# Patient Record
Sex: FEMALE | Race: WHITE | NOT HISPANIC OR LATINO | Employment: OTHER | URBAN - METROPOLITAN AREA
[De-identification: names, ages, dates, MRNs, and addresses within clinical notes are randomized per-mention and may not be internally consistent; named-entity substitution may affect disease eponyms.]

---

## 2017-01-11 ENCOUNTER — TRANSCRIBE ORDERS (OUTPATIENT)
Dept: LAB | Facility: HOSPITAL | Age: 82
End: 2017-01-11

## 2017-01-11 ENCOUNTER — APPOINTMENT (OUTPATIENT)
Dept: LAB | Facility: HOSPITAL | Age: 82
End: 2017-01-11
Attending: INTERNAL MEDICINE
Payer: COMMERCIAL

## 2017-01-11 DIAGNOSIS — E55.9 VITAMIN D DEFICIENCY: ICD-10-CM

## 2017-01-11 DIAGNOSIS — E78.5 HYPERLIPIDEMIA, UNSPECIFIED HYPERLIPIDEMIA TYPE: ICD-10-CM

## 2017-01-11 DIAGNOSIS — E87.8 OTHER DISORDERS OF ELECTROLYTE AND FLUID BALANCE, NOT ELSEWHERE CLASSIFIED: ICD-10-CM

## 2017-01-11 DIAGNOSIS — Q78.2 OSTEOPETROSIS: ICD-10-CM

## 2017-01-11 DIAGNOSIS — M19.90 OSTEOARTHRITIS: ICD-10-CM

## 2017-01-11 DIAGNOSIS — I10 ESSENTIAL (PRIMARY) HYPERTENSION: ICD-10-CM

## 2017-01-11 DIAGNOSIS — E78.5 HYPERLIPIDEMIA: ICD-10-CM

## 2017-01-11 DIAGNOSIS — M19.90 SENILE ARTHRITIS: Primary | ICD-10-CM

## 2017-01-11 DIAGNOSIS — R07.89 OTHER CHEST PAIN: ICD-10-CM

## 2017-01-11 DIAGNOSIS — I10 ESSENTIAL HYPERTENSION, MALIGNANT: ICD-10-CM

## 2017-01-11 LAB
ALT SERPL W P-5'-P-CCNC: 18 U/L (ref 12–78)
ANION GAP SERPL CALCULATED.3IONS-SCNC: 7 MMOL/L (ref 4–13)
AST SERPL W P-5'-P-CCNC: 12 U/L (ref 5–45)
BUN SERPL-MCNC: 17 MG/DL (ref 5–25)
CALCIUM SERPL-MCNC: 9.7 MG/DL (ref 8.3–10.1)
CHLORIDE SERPL-SCNC: 105 MMOL/L (ref 100–108)
CHOLEST SERPL-MCNC: 215 MG/DL (ref 50–200)
CO2 SERPL-SCNC: 29 MMOL/L (ref 21–32)
CREAT SERPL-MCNC: 0.95 MG/DL (ref 0.6–1.3)
ERYTHROCYTE [DISTWIDTH] IN BLOOD BY AUTOMATED COUNT: 12.6 % (ref 11.6–15.1)
GFR SERPL CREATININE-BSD FRML MDRD: 56.6 ML/MIN/1.73SQ M
GLUCOSE SERPL-MCNC: 89 MG/DL (ref 65–140)
HCT VFR BLD AUTO: 41.7 % (ref 34.8–46.1)
HDLC SERPL-MCNC: 79 MG/DL (ref 40–60)
HGB BLD-MCNC: 13.3 G/DL (ref 11.5–15.4)
LDLC SERPL CALC-MCNC: 114 MG/DL (ref 0–100)
LDLC SERPL DIRECT ASSAY-MCNC: 124 MG/DL (ref 0–100)
MCH RBC QN AUTO: 28.2 PG (ref 26.8–34.3)
MCHC RBC AUTO-ENTMCNC: 31.9 G/DL (ref 31.4–37.4)
MCV RBC AUTO: 89 FL (ref 82–98)
PLATELET # BLD AUTO: 240 THOUSANDS/UL (ref 149–390)
PMV BLD AUTO: 11.6 FL (ref 8.9–12.7)
POTASSIUM SERPL-SCNC: 4.6 MMOL/L (ref 3.5–5.3)
RBC # BLD AUTO: 4.71 MILLION/UL (ref 3.81–5.12)
SODIUM SERPL-SCNC: 141 MMOL/L (ref 136–145)
TRIGL SERPL-MCNC: 112 MG/DL
WBC # BLD AUTO: 7.2 THOUSAND/UL (ref 4.31–10.16)

## 2017-01-11 PROCEDURE — 80048 BASIC METABOLIC PNL TOTAL CA: CPT

## 2017-01-11 PROCEDURE — 85027 COMPLETE CBC AUTOMATED: CPT

## 2017-01-11 PROCEDURE — 36415 COLL VENOUS BLD VENIPUNCTURE: CPT

## 2017-01-11 PROCEDURE — 83721 ASSAY OF BLOOD LIPOPROTEIN: CPT

## 2017-01-11 PROCEDURE — 84460 ALANINE AMINO (ALT) (SGPT): CPT

## 2017-01-11 PROCEDURE — 80061 LIPID PANEL: CPT

## 2017-01-11 PROCEDURE — 84450 TRANSFERASE (AST) (SGOT): CPT

## 2017-01-13 ENCOUNTER — ALLSCRIPTS OFFICE VISIT (OUTPATIENT)
Dept: OTHER | Facility: OTHER | Age: 82
End: 2017-01-13

## 2017-01-13 ENCOUNTER — TRANSCRIBE ORDERS (OUTPATIENT)
Dept: ADMINISTRATIVE | Facility: HOSPITAL | Age: 82
End: 2017-01-13

## 2017-01-13 DIAGNOSIS — R07.89 CHEST DISCOMFORT: Primary | ICD-10-CM

## 2017-01-19 ENCOUNTER — GENERIC CONVERSION - ENCOUNTER (OUTPATIENT)
Dept: OTHER | Facility: OTHER | Age: 82
End: 2017-01-19

## 2017-01-19 ENCOUNTER — HOSPITAL ENCOUNTER (OUTPATIENT)
Dept: NON INVASIVE DIAGNOSTICS | Facility: CLINIC | Age: 82
Discharge: HOME | End: 2017-01-19
Payer: COMMERCIAL

## 2017-01-19 DIAGNOSIS — R07.89 CHEST DISCOMFORT: ICD-10-CM

## 2017-01-19 DIAGNOSIS — R07.2 PRECORDIAL PAIN: ICD-10-CM

## 2017-01-19 LAB
ARRHY DURING EX: NORMAL
CHEST PAIN STATEMENT: NORMAL
MAX DIASTOLIC BP: 70 MMHG
MAX HEART RATE: 131 BPM
MAX PREDICTED HEART RATE: 140 BPM
MAX. SYSTOLIC BP: 160 MMHG
PROTOCOL NAME: NORMAL
REASON FOR TERMINATION: NORMAL
TARGET HR FORMULA: NORMAL
TEST INDICATION: NORMAL
TIME IN EXERCISE PHASE: 300 S

## 2017-01-19 PROCEDURE — A9502 TC99M TETROFOSMIN: HCPCS

## 2017-01-19 PROCEDURE — 78452 HT MUSCLE IMAGE SPECT MULT: CPT

## 2017-01-19 PROCEDURE — 93017 CV STRESS TEST TRACING ONLY: CPT

## 2017-02-03 ENCOUNTER — ALLSCRIPTS OFFICE VISIT (OUTPATIENT)
Dept: OTHER | Facility: OTHER | Age: 82
End: 2017-02-03

## 2017-05-22 ENCOUNTER — ALLSCRIPTS OFFICE VISIT (OUTPATIENT)
Dept: OTHER | Facility: OTHER | Age: 82
End: 2017-05-22

## 2017-05-22 DIAGNOSIS — I10 ESSENTIAL (PRIMARY) HYPERTENSION: ICD-10-CM

## 2017-05-22 DIAGNOSIS — E78.5 HYPERLIPIDEMIA: ICD-10-CM

## 2017-05-22 DIAGNOSIS — E55.9 VITAMIN D DEFICIENCY: ICD-10-CM

## 2017-06-14 ENCOUNTER — GENERIC CONVERSION - ENCOUNTER (OUTPATIENT)
Dept: OTHER | Facility: OTHER | Age: 82
End: 2017-06-14

## 2017-06-26 ENCOUNTER — GENERIC CONVERSION - ENCOUNTER (OUTPATIENT)
Dept: OTHER | Facility: OTHER | Age: 82
End: 2017-06-26

## 2017-07-25 ENCOUNTER — GENERIC CONVERSION - ENCOUNTER (OUTPATIENT)
Dept: OTHER | Facility: OTHER | Age: 82
End: 2017-07-25

## 2017-09-07 ENCOUNTER — GENERIC CONVERSION - ENCOUNTER (OUTPATIENT)
Dept: OTHER | Facility: OTHER | Age: 82
End: 2017-09-07

## 2017-10-03 ENCOUNTER — LAB REQUISITION (OUTPATIENT)
Dept: LAB | Facility: HOSPITAL | Age: 82
End: 2017-10-03
Payer: COMMERCIAL

## 2017-10-03 DIAGNOSIS — E55.9 VITAMIN D DEFICIENCY: ICD-10-CM

## 2017-10-03 DIAGNOSIS — I10 ESSENTIAL (PRIMARY) HYPERTENSION: ICD-10-CM

## 2017-10-03 DIAGNOSIS — E78.5 HYPERLIPIDEMIA: ICD-10-CM

## 2017-10-03 LAB
25(OH)D3 SERPL-MCNC: 38.4 NG/ML (ref 30–100)
ANION GAP SERPL CALCULATED.3IONS-SCNC: 4 MMOL/L (ref 4–13)
BASOPHILS # BLD AUTO: 0.04 THOUSANDS/ΜL (ref 0–0.1)
BASOPHILS NFR BLD AUTO: 1 % (ref 0–1)
BUN SERPL-MCNC: 27 MG/DL (ref 5–25)
CALCIUM SERPL-MCNC: 9.9 MG/DL (ref 8.3–10.1)
CHLORIDE SERPL-SCNC: 106 MMOL/L (ref 100–108)
CHOLEST SERPL-MCNC: 196 MG/DL (ref 50–200)
CO2 SERPL-SCNC: 29 MMOL/L (ref 21–32)
CREAT SERPL-MCNC: 1.02 MG/DL (ref 0.6–1.3)
EOSINOPHIL # BLD AUTO: 0.13 THOUSAND/ΜL (ref 0–0.61)
EOSINOPHIL NFR BLD AUTO: 2 % (ref 0–6)
ERYTHROCYTE [DISTWIDTH] IN BLOOD BY AUTOMATED COUNT: 12.7 % (ref 11.6–15.1)
GFR SERPL CREATININE-BSD FRML MDRD: 52 ML/MIN/1.73SQ M
GLUCOSE P FAST SERPL-MCNC: 96 MG/DL (ref 65–99)
HCT VFR BLD AUTO: 43 % (ref 34.8–46.1)
HDLC SERPL-MCNC: 82 MG/DL (ref 40–60)
HGB BLD-MCNC: 13.5 G/DL (ref 11.5–15.4)
LDLC SERPL CALC-MCNC: 94 MG/DL (ref 0–100)
LYMPHOCYTES # BLD AUTO: 3.01 THOUSANDS/ΜL (ref 0.6–4.47)
LYMPHOCYTES NFR BLD AUTO: 44 % (ref 14–44)
MCH RBC QN AUTO: 28 PG (ref 26.8–34.3)
MCHC RBC AUTO-ENTMCNC: 31.4 G/DL (ref 31.4–37.4)
MCV RBC AUTO: 89 FL (ref 82–98)
MONOCYTES # BLD AUTO: 0.57 THOUSAND/ΜL (ref 0.17–1.22)
MONOCYTES NFR BLD AUTO: 9 % (ref 4–12)
NEUTROPHILS # BLD AUTO: 2.91 THOUSANDS/ΜL (ref 1.85–7.62)
NEUTS SEG NFR BLD AUTO: 44 % (ref 43–75)
NRBC BLD AUTO-RTO: 0 /100 WBCS
PLATELET # BLD AUTO: 246 THOUSANDS/UL (ref 149–390)
PMV BLD AUTO: 11.6 FL (ref 8.9–12.7)
POTASSIUM SERPL-SCNC: 4.6 MMOL/L (ref 3.5–5.3)
RBC # BLD AUTO: 4.82 MILLION/UL (ref 3.81–5.12)
SODIUM SERPL-SCNC: 139 MMOL/L (ref 136–145)
TRIGL SERPL-MCNC: 99 MG/DL
WBC # BLD AUTO: 6.68 THOUSAND/UL (ref 4.31–10.16)

## 2017-10-03 PROCEDURE — 85025 COMPLETE CBC W/AUTO DIFF WBC: CPT | Performed by: INTERNAL MEDICINE

## 2017-10-03 PROCEDURE — 80061 LIPID PANEL: CPT | Performed by: INTERNAL MEDICINE

## 2017-10-03 PROCEDURE — 80048 BASIC METABOLIC PNL TOTAL CA: CPT | Performed by: INTERNAL MEDICINE

## 2017-10-03 PROCEDURE — 82306 VITAMIN D 25 HYDROXY: CPT | Performed by: INTERNAL MEDICINE

## 2017-10-16 ENCOUNTER — ALLSCRIPTS OFFICE VISIT (OUTPATIENT)
Dept: OTHER | Facility: OTHER | Age: 82
End: 2017-10-16

## 2017-10-17 NOTE — PROGRESS NOTES
Assessment  1  Benign essential hypertension (401 1) (I10)   2  Hyperlipidemia (272 4) (E78 5)   3  Vitamin D deficiency (268 9) (E55 9)    Plan  Benign essential hypertension    · AmLODIPine Besylate 10 MG Oral Tablet; TAKE 1 TABLET DAILY  Benign essential hypertension, Hyperlipidemia, Vitamin D deficiency    · Follow-up visit in 4 Months Evaluation and Treatment  Follow-up  Status: Hold For - Scheduling   Requested for: 16Oct2017  Hyperchloremia    · Simvastatin 20 MG Oral Tablet; Take 1 tablet daily  PMH: Anxiety    · LORazepam 0 5 MG Oral Tablet; TAKE ONE TABLET AT NIGHT AS NEEDED  PMH: Encounter for screening for malignant neoplasm of colon    · COLONOSCOPY; Status:Active; Requested for:16Oct2017;   PMH: History of esophageal reflux    · Pantoprazole Sodium 40 MG Oral Tablet Delayed Release; TAKE 1 TABLET DAILY    Discussion/Summary  Discussion Summary:   See med list discussed  Counseling Documentation With Imm: The patient was counseled regarding diagnostic results,-- instructions for management,-- risk factor reductions,-- prognosis  Chief Complaint  Chief Complaint Free Text Note Form: Pt is here for a f/u appt  Pt states that she was dx w/basal cell carcinoma by [facility], and has some questions and concerns  Pt states that she is still getting pain in her left side of chest and a heaviness in her left arm and is concerned, even though she had a stress test and EKG in January, she is still concerned about it  BW       History of Present Illness  Hyperlipidemia (Follow-Up): The patient states her hyperlipidemia has been stable since the last visit  Comorbid Illnesses: hypertension  Symptoms: The patient is currently asymptomatic  Medications: the patient is adherent with her medication regimen  -- She denies medication side effects  The patient is doing well with her hyperlipidemia goals  Hypertension (Follow-Up): The patient presents for follow-up of essential hypertension   The patient states she has been doing well with her blood pressure control since the last visit  She has no comorbid illnesses  Symptoms: worsened lower extremity edema  Home monitoring: The patient checks her blood pressure sporadically  Medications: the patient is adherent with her medication regimen  -- She denies medication side effects  The patient is due for a lipid panel-- and-- a serum creatinine  Review of Systems  PHQ-9 Depression Scale: Over the past 2 weeks, how often have you been bothered by the following problems? 1 ) Little interest or pleasure in doing things? Not at all    2 ) Feeling down, depressed or hopeless? Not at all    3 ) Trouble falling asleep or sleeping too much? Nearly every day  4 ) Feeling tired or having little energy? Several days  5 ) Poor appetite or overeating? Not at all    6 ) Feeling bad about yourself, or that you are a failure, or have let yourself or your family down? Several days  7 ) Trouble concentrating on things, such as reading a newspaper or watching television? Not at all    8 ) Moving or speaking so slowly that other people could have noticed, or the opposite, moving or speaking faster than usual? Not at all  severity of depression is mild   How difficult have these problems made it for you to do your work, take care of things at home, or get along with people? Not at all  Score 5       Preventive Quality 65 Older:   Preventive Quality 65 and Older: Falls Risk: The patient fell 0 times in the past 12 months  Symptoms Include: impaired balance and visual problems, but no confusion, no lightheadedness, no vertigo, no dizziness, no syncope, no leg weakness, no recurring falls and no recent fall  The patient is currently experiencing fall symptoms  The patient is currently experiencing urinary symptoms   Urinary Incontinence Symptoms includes: incomplete bladder emptying, urinary frequency, urinary urgency, urinary hesitancy, nocturia and intermittent stream, but no urinary incontinence, no dysuria, no post-void dribbling, no vaginal pressure and no vaginal dryness       Active Problems  1  Arthritis of knee (716 96) (M17 10)   2  Benign essential hypertension (401 1) (I10)   3  Chest wall pain (786 52) (R07 89)   4  Hyperchloremia (276 9) (E87 8)   5  Hyperlipidemia (272 4) (E78 5)   6  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)   7  Precordial pain (786 51) (R07 2)   8  Vitamin D deficiency (268 9) (E55 9)    Past Medical History  1  History of Anxiety (300 00) (F41 9)   2  History of Appendicitis (541) (K37)   3  History of Arm pain, right (729 5) (M79 601)   4  History of Asymptomatic varicose veins (454 9) (I83 90)   5  History of Biceps strain (840 8) (S46 219A)   6  History of Biceps tendonitis (726 12) (M75 20)   7  History of Bruise of face (920) (S00 83XA)   8  History of Chest injury (959 11) (S29 9XXA)   9  History of dizziness (V13 89) (Z87 898)   10  History of esophageal reflux (V12 79) (Z87 19)   11  History of esophageal reflux (V12 79) (Z87 19)   12  History of fracture of rib (V15 51) (Z87 81)   13  History of hematuria (V13 09) (Z87 448)   14  History of insomnia (V13 89) (Z87 898)   15  History of motion sickness (V13 89) (Z87 898)   16  History of tendinitis (V13 59) (Z87 39)   17  History of urinary tract infection (V13 02) (Z87 440)   18  History of Knee pain (719 46) (M25 569)   19  History of Paronychia or onychia of finger (681 02) (L03 019)   20  History of Rib pain (786 50) (R07 81)   21  History of Shoulder strain (840 9) (S46 919A)   22  History of Spider veins (448 1) (I78 1)   23  History of Urinary hesitancy (788 64) (R39 11)   24  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,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)  Active Problems And Past Medical History Reviewed: The active problems and past medical history were reviewed and updated today  Surgical History  1  History of Appendectomy   2  History of Hysterectomy   3  History of Incisional Breast Biopsy   4  History of Vaginal Hysterectomy  Surgical History Reviewed: The surgical history was reviewed and updated today  Family History  Mother    1  Family history of diabetes mellitus (V18 0) (Z83 3)   2  Family history of Varicose veins  Brother    3  Family history of diabetes mellitus (V18 0) (Z83 3)  Family History Reviewed: The family history was reviewed and updated today  Social History   · Being A Social Drinker   · Never A Smoker   · No illicit drug use  Social History Reviewed: The social history was reviewed and updated today  Current Meds   1  AmLODIPine Besylate 10 MG Oral Tablet; TAKE 1 TABLET DAILY; Therapy: 31CIW5935 to (Evaluate:04Apr2017)  Requested for: 69MCY5908; Last Rx:06Oct2016   Ordered   2  LORazepam 0 5 MG Oral Tablet; TAKE ONE TABLET AT NIGHT AS NEEDED; Therapy: 68AJS5655 to (Last Rx:25Jan2017)  Requested for: 11Oct2017; Status: ACTIVE - Renewal   Denied Ordered   3  Pantoprazole Sodium 40 MG Oral Tablet Delayed Release; TAKE 1 TABLET DAILY; Therapy: 41QZO6703 to (Evaluate:72Xcu2682)  Requested for: 25Jan2017; Last Rx:25Jan2017   Ordered   4  Simvastatin 20 MG Oral Tablet; Take 1 tablet daily; Therapy: 96RID5443 to (Evaluate:16Oct2017)  Requested for: 19Apr2017; Last Rx:19Apr2017   Ordered   5  Vitamin D3 2000 UNIT Oral Capsule; take 1 capsule daily; Therapy: 24KVJ1498 to Recorded  Medication List Reviewed: The medication list was reviewed and updated today  Allergies  1  Daypro   2  Minocin  3  No Known Environmental Allergies   4   No Known Food Allergies    Vitals  Vital Signs    Recorded: 45LDN6134 08:49AM   Temperature 97 7 F, Oral   Heart Rate 74   Systolic 412, LUE, Sitting   Diastolic 58, LUE, Sitting   Height 5 ft 3 in   Weight 128 lb 8 oz   BMI Calculated 22 76   BSA Calculated 1 6   O2 Saturation 98, RA     Physical Exam    Constitutional   General appearance: No acute distress, well appearing and well nourished  Eyes   Conjunctiva and lids: No swelling, erythema or discharge  Ears, Nose, Mouth, and Throat   External inspection of ears and nose: Normal     Nasal mucosa, septum, and turbinates: Normal without edema or erythema  Oropharynx: Normal with no erythema, edema, exudate or lesions  Pulmonary   Auscultation of lungs: Clear to auscultation  Cardiovascular   Auscultation of heart: Normal rate and rhythm, normal S1 and S2, without murmurs  Examination of extremities for edema and/or varicosities: Normal     Carotid pulses: Normal     Abdomen   Abdomen: Non-tender, no masses  Liver and spleen: No hepatomegaly or splenomegaly  Musculoskeletal   Gait and station: Normal     Skin   Skin and subcutaneous tissue: Abnormal  -- small lesion over lt  cheel  Neurologic   Cranial nerves: Cranial nerves 2-12 intact  Reflexes: 2+ and symmetric  Psychiatric   Orientation to person, place, and time: Normal     Mood and affect: Abnormal   Mood and Affect: anxious  Results/Data  (1) CBC/PLT/DIFF 03Oct2017 01:03PM Du Srinivasan     Test Name Result Flag Reference   WBC COUNT 6 68 Thousand/uL  4 31-10 16   RBC COUNT 4 82 Million/uL  3 81-5 12   HEMOGLOBIN 13 5 g/dL  11 5-15 4   HEMATOCRIT 43 0 %  34 8-46  1   MCV 89 fL  82-98   MCH 28 0 pg  26 8-34 3   MCHC 31 4 g/dL  31 4-37 4   RDW 12 7 %  11 6-15 1   MPV 11 6 fL  8 9-12 7   PLATELET COUNT 962 Thousands/uL  149-390   nRBC AUTOMATED 0 /100 WBCs     NEUTROPHILS RELATIVE PERCENT 44 %  43-75   LYMPHOCYTES RELATIVE PERCENT 44 %  14-44   MONOCYTES RELATIVE PERCENT 9 %  4-12   EOSINOPHILS RELATIVE PERCENT 2 %  0-6   BASOPHILS RELATIVE PERCENT 1 %  0-1   NEUTROPHILS ABSOLUTE COUNT 2 91 Thousands/? ??L  1 85-7 62   LYMPHOCYTES ABSOLUTE COUNT 3 01 Thousands/? ??L  0 60-4 47   MONOCYTES ABSOLUTE COUNT 0 57 Thousand/? ??L  0 17-1 22   EOSINOPHILS ABSOLUTE COUNT 0 13 Thousand/? ??L  0 00-0 61   BASOPHILS ABSOLUTE COUNT 0 04 Thousands/? ??L  0 00-0 10 This is a patient instruction: This test is non-fasting  Please drink two glasses of water morning of bloodwork  (1) VITAMIN D 25-HYDROXY 14Fox2659 01:03PM [facility]     Test Name Result Flag Reference   VIT D 25-HYDROX 38 4 ng/mL  30 0-100 0   This assay is a certified procedure of the CDC Vitamin D Standardization Certification Program (VDSCP)     Deficiency <20ng/ml   Insufficiency 20-30ng/ml   Sufficient  ng/ml     *Patients undergoing fluorescein dye angiography may retain small amounts of fluorescein in the body for 48-72 hours post procedure  Samples containing fluorescein can produce falsely elevated Vitamin D values  If the patient had this procedure, a specimen should be resubmitted post fluorescein clearance  (1) BASIC METABOLIC PROFILE [id number] 01:03PM [facility]     Test Name Result Flag Reference   SODIUM 139 mmol/L  136-145   POTASSIUM 4 6 mmol/L  3 5-5 3   CHLORIDE 106 mmol/L  100-108   CARBON DIOXIDE 29 mmol/L  21-32   ANION GAP (CALC) 4 mmol/L  4-13   BLOOD UREA NITROGEN 27 mg/dL H 5-25   CREATININE 1 02 mg/dL  0 60-1 30   Standardized to IDMS reference method   CALCIUM 9 9 mg/dL  8 3-10 1   eGFR 52 ml/min/1 73sq m     National Kidney Disease Education Program recommendations are as follows:  GFR calculation is accurate only with a steady state creatinine  Chronic Kidney disease less than 60 ml/min/1 73 sq  meters  Kidney failure less than 15 ml/min/1 73 sq  meters  GLUCOSE FASTING 96 mg/dL  65-99   Specimen collection should occur prior to Sulfasalazine administration due to the potential for falsely depressed results  Specimen collection should occur prior to Sulfapyridine administration due to the potential for falsely elevated results       (1) LIPID PANEL, FASTING [id number] 01:03PM [facility]     Test Name Result Flag Reference   CHOLESTEROL 196 mg/dL     HDL,DIRECT 82 mg/dL H 40-60   Specimen collection should occur prior to Metamizole administration due to the potential for falsley depressed results  LDL CHOLESTEROL CALCULATED 94 mg/dL  0-100   This is a patient instruction: This is a fasting test  Water, black tea or black coffee only after 9:00pm the night before the test  Drink 2 glasses of water the morning of the test       Triglyceride:        Normal <150 mg/dl   Borderline High 150-199 mg/dl   High 200-499 mg/dl   Very High >499 mg/dl      Cholesterol:       Desirable <200 mg/dl    Borderline High 200-239 mg/dl    High >239 mg/dl      HDL Cholesterol:       High>59 mg/dL    Low <41 mg/dL      This screening LDL is a calculated result  It does not have the accuracy of the Direct Measured LDL in the monitoring of patients with hyperlipidemia and/or statin therapy  Direct Measure LDL (FGT751) must be ordered separately in these patients  TRIGLYCERIDES 99 mg/dL  <=150   Specimen collection should occur prior to N-Acetylcysteine or Metamizole administration due to the potential for falsely depressed results  Health Management  History of Encounter for screening mammogram for malignant neoplasm of breast   * MAMMO SCREENING BILATERAL W CAD; every 1 year; Last 78XPF8445; Next Due: 47Jyw6284; Active  History of Screening for genitourinary condition   *VB - Urinary Incontinence Screen (Dx Z13 89 Screen for UI); every 1 year; Last 07KNN9090; Next  Due: 20ARB2818; Overdue  History of Screening for neurological condition   *VB - Fall Risk Assessment  (Dx Z13 89 Screen for Neurologic Disorder); every 1 year; Last  08PPU1067; Next Due: 54AWO3452; Overdue  Health Maintenance   Medicare Annual Wellness Visit; every 1 year; Last 88ICK7109; Next Due: 84XGW2375; Overdue    Signatures   Electronically signed by :  Eladia Butt MD; Oct 16 2017  9:19AM EST                       (Author)

## 2017-11-03 ENCOUNTER — GENERIC CONVERSION - ENCOUNTER (OUTPATIENT)
Dept: OTHER | Facility: OTHER | Age: 82
End: 2017-11-03

## 2017-11-28 ENCOUNTER — GENERIC CONVERSION - ENCOUNTER (OUTPATIENT)
Dept: OTHER | Facility: OTHER | Age: 82
End: 2017-11-28

## 2017-11-28 DIAGNOSIS — M54.50 LOW BACK PAIN: ICD-10-CM

## 2018-01-12 VITALS
HEART RATE: 78 BPM | WEIGHT: 130.13 LBS | SYSTOLIC BLOOD PRESSURE: 118 MMHG | BODY MASS INDEX: 23.06 KG/M2 | TEMPERATURE: 98.1 F | HEIGHT: 63 IN | OXYGEN SATURATION: 98 % | DIASTOLIC BLOOD PRESSURE: 68 MMHG

## 2018-01-12 NOTE — PROGRESS NOTES
Assessment    1  Encounter for preventive health examination (V70 0) (Z00 00)    Plan  Benign essential hypertension, Health Maintenance, Hyperchloremia    · (1) CBC/ PLT (NO DIFF); Status:Active; Requested [id number]; Health Maintenance    · (1) ALT (SGPT); Status:Active; Requested CPN:[id number];    · (1) AST (SGOT); Status:Active; Requested WNQ:[id number];    · (1) BASIC METABOLIC PROFILE; Status:Active; Requested [id number];    · (Q) LIPID PANEL WITH DIRECT LDL; Status:Active; Requested ECD:[id number];    · Follow-up visit in 3 months Evaluation and Treatment  Follow-up  Status: Hold For -  Scheduling  Requested for: 72UBE8679  PMH: History of esophageal reflux    · Pantoprazole Sodium 40 MG Oral Tablet Delayed Release; TAKE 1 TABLET  DAILY  Vitamin D deficiency    · Vitamin D3 12653 UNIT Oral Capsule; Take 1 capsule, 1 x per week, for 12 weeks    Discussion/Summary  Impression: Initial Annual Wellness Visit  Cardiovascular screening and counseling: the risks and benefits of screening were discussed and screening is current  Diabetes screening and counseling: the risks and benefits of screening were discussed and screening is current  Colorectal cancer screening and counseling: the risks and benefits of screening were discussed and screening is current  Breast cancer screening and counseling: the risks and benefits of screening were discussed and screening is current  Cervical cancer screening and counseling: the risks and benefits of screening were discussed and screening not indicated  Osteoporosis screening and counseling: the risks and benefits of screening were discussed and the patient declines screening  Abdominal aortic aneurysm screening and counseling: screening not indicated  HIV screening and counseling: the risks and benefits of screening were discussed and screening not indicated   Immunizations: the risks and benefits of influenza vaccination were discussed with the patient, influenza vaccine is up to date this year, the risks and benefits of pneumococcal vaccination were discussed with the patient, the lifetime pneumococcal vaccine has been completed, hepatitis B prevention counseling was provided, the risks and benefits of the hepatitis vaccination series were discussed with the patient, the patient declines the hepatitis vaccination series, the risks and benefits of the Zostavax vaccine were discussed with the patient, Zostavax vaccination up to date, the risks and benefits of the Td vaccine were discussed with the patient, the patient declines the Td vaccine, the risks and benefits of the Tdap vaccine were discussed with the patient and the patient declines the Tdap vaccine  Advance Directive Planning: complete and up to date  Advice and education were given regarding fall risk reduction  She was referred to none  Medical Equipment/Suppliers: none  Patient Discussion: plan discussed with the patient, follow-up visit needed in one year  Chief Complaint  Medicare wellness visit      History of Present Illness  Welcome to Medicare and Wellness Visits: The patient is being seen for the subsequent annual wellness visit  Medicare Screening and Risk Factors   Hospitalizations: she has been previously hospitalizied and 6  Medicare Screening Tests Risk Questions   Osteoporosis risk assessment: , female gender and over 48years of age  Drug and Alcohol Use: The patient has never smoked cigarettes  The patient reports occasional alcohol use  Alcohol concern:   The patient has no concerns about alcohol abuse  She has never used illicit drugs  Diet and Physical Activity: Current diet includes 1 servings of fruit per day, 1 servings of vegetables per day, 1 servings of meat per day, 1 servings of whole grains per day and 2 cups of coffee per day  She exercises 1 times per week  Exercise: 2 hours per week     Mood Disorder and Cognitive Impairment Screening: She denies feeling down, depressed, or hopeless over the past two weeks  Cognitive impairment screening: denies difficulty learning/retaining new information, denies difficulty handling complex tasks, denies difficulty with reasoning, denies difficulty with spatial ability and orientation, denies difficulty with language and denies difficulty with behavior  Functional Ability/Level of Safety: The patient is currently able to do activities of daily living without limitations, able to do instrumental activities of daily living without limitations, able to participate in social activities without limitations and able to drive without limitations  Activities of daily living details: does not need help using the phone, no transportation help needed, does not need help shopping, no meal preparation help needed, does not need help doing housework, does not need help doing laundry, does not need help managing medications and does not need help managing money  Fall risk factors:  sedative use  Home safety risk factors:  no unfamiliar surroundings, no loose rugs, no poor household lighting, no uneven floors, no household clutter, grab bars in the bathroom and handrails on the stairs  Advance Directives: Advance directives: living will and durable power of  for health care directives     Co-Managers and Medical Equipment/Suppliers: See Patient Care Team   Preventive Quality Program 65 and Older: Symptoms Include: visual problems, but no confusion, no lightheadedness, no vertigo, no dizziness, no syncope, no leg weakness, no recurring falls and no recent fall  Urinary Incontinence Symptoms includes: incomplete bladder emptying, nocturia and weak stream, but no urinary incontinence, no urinary frequency, no urinary urgency, no urinary hesitancy, no dysuria, no straining, no intermittent stream, no post-void dribbling, no vaginal pressure and no vaginal dryness        Patient Care Team    Care Team Member Role Specialty Office Number   [facility]  Vascular Surgery [phone]     Review of Systems    Constitutional: negative  Head and Face: negative  ENT: negative  Cardiovascular: negative  Respiratory: negative  Musculoskeletal: negative  Neurological: negative  Over the past 2 weeks, how often have you been bothered by the following problems? 1 ) Little interest or pleasure in doing things? Not at all    2 ) Feeling down, depressed or hopeless? Not at all    3 ) Trouble falling asleep or sleeping too much? Not at all    4 ) Feeling tired or having little energy? Not at all    5 ) Poor appetite or overeating? Not at all    6 ) Feeling bad about yourself, or that you are a failure, or have let yourself or your family down? Not at all    7 ) Trouble concentrating on things, such as reading a newspaper or watching television? Not at all    8 ) Moving or speaking so slowly that other people could have noticed, or the opposite, moving or speaking faster than usual? Not at all    9 ) Thoughts that you would be better off dead or of hurting yourself in some way? 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)   8  Rib fracture (807 00) (S22 39XA)   9  Spider veins (448 1) (I78 1)   10   Vitamin D deficiency (268 9) (E55 9)    Past Medical History    · History of Anxiety (300 00) (F41 9)   · History of Appendicitis (541) (K37)   · History of Arm pain, right (729 5) (M79 601)   · History of Asymptomatic varicose veins (454 9) (I83 90)   · History of Biceps strain (840 8) (S46 119A)   · History of Bruise of face (920) (S00 83XA)   · History of Chest injury (959 11) (S29 9XXA)   · History of Encounter for screening mammogram for malignant neoplasm of breast  (V76 12) (Z12 31)   · History of dizziness (V13 89) (H98 530)   · History of esophageal reflux (V12 79) (Z87 19)   · History of esophageal reflux (V12 79) (Z87 19)   · History of hematuria (V13 09) (Z87 448)   · History of insomnia (V13 89) (V10 644)   · History of motion sickness (V13 89) (O31 953)   · History of tendinitis (V13 59) (Z87 39)   · History of urinary tract infection (V13 02) (Z87 440)   · History of Knee pain (719 46) (M25 569)   · History of Need for prophylactic vaccination and inoculation against influenza (V04 81)  (Z23)   · History of Paronychia or onychia of finger (681 02) (L03 019)   · History of Rib pain (786 50) (R07 81)   · History of Screening for AAA (abdominal aortic aneurysm) (V81 2) (Z13 6)   · History of Screening for genitourinary condition (V81 6) (Z13 89)   · History of Screening for neurological condition (V80 09) (Z13 89)   · History of Shoulder strain (840 9) (S46 919A)   · History of Urinary hesitancy (788 64) (R39 11)   · 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)  (I83 229,I83 219)    The active problems and past medical history were reviewed and updated today  Surgical History    · History of Appendectomy   · History of Appendectomy   · History of Hysterectomy   · History of Incisional Breast Biopsy   · History of Vaginal Hysterectomy    The surgical history was reviewed and updated today  Family History  Mother    · Family history of diabetes mellitus (V18 0) (Z83 3)   · Family history of Varicose veins  Brother    · Family history of diabetes mellitus (V18 0) (Z83 3)    The family history was reviewed and updated today  Social History    · Being A Social Drinker   · Denied: History of Drug Use   · Never A Smoker  The social history was reviewed and updated today  Current Meds   1  AmLODIPine Besylate 10 MG Oral Tablet; TAKE 1 TABLET DAILY;    Therapy: 13XTY9206 to (Evaluate:24Qnp1635)  Requested for: 94XYB3049; Last   Rx:52Pns0143 Ordered   2  LORazepam 0 5 MG Oral Tablet; TAKE ONE TABLET AT NIGHT AS NEEDED; Therapy: 43NQR1891 to (Last Rx:23Apr2016) Ordered   3  Pantoprazole Sodium 40 MG Oral Tablet Delayed Release; TAKE 1 TABLET DAILY; Therapy: 29BLF9515 to (Evaluate:82Kkm0058)  Requested for: 10Aug2015; Last   Rx:10Aug2015 Ordered   4  Simvastatin 20 MG Oral Tablet; TAKE 1 TABLET DAILY AS DIRECTED; Therapy: 28KLW7412 to (Evaluate:14Nov2015)  Requested for: 62SFK4265; Last   Rx:19Zqo3646 Ordered   5  Vitamin D3 93540 UNIT Oral Capsule; Take 1 capsule, 1 x per week, for 12 weeks; Therapy: 34PPN4752 to (Last Rx:06Jan2015)  Requested for: 93XOI1413 Ordered   6  Voltaren 1 % Transdermal Gel; apply a thin layer 2-3 times a day to right arm; Therapy: 48WFR1499 to (Evaluate:10Jan2016)  Requested for: 28IUG5771; Last   Rx:44Duo9392 Ordered    The medication list was reviewed and updated today  Allergies    1  Daypro   2  Minocin    3  No Known Environmental Allergies   4  No Known Food Allergies    Immunizations   1 2 3    Influenza  Oct 2013 80Vtq0636 28SAS7406    Zoster  Nov 2013       Vitals  Signs [Data Includes: Current Encounter]    Temperature: 97 3 F, Oral  Heart Rate: 63  Systolic: 296, LUE, Sitting  Diastolic: 64, LUE, Sitting  Height: 5 ft 3 in  Weight: 131 lb   BMI Calculated: 23 21  BSA Calculated: 1 62  O2 Saturation: 98    Physical Exam    Constitutional   General appearance: No acute distress, well appearing and well nourished  Head and Face   Head and face: Normal     Eyes   Conjunctiva and lids: No swelling, erythema or discharge  Ears, Nose, Mouth, and Throat   Otoscopic examination: Tympanic membranes translucent with normal light reflex  Canals patent without erythema  Lips, teeth, and gums: Normal, good dentition  Oropharynx: Normal with no erythema, edema, exudate or lesions  Neck   Neck: Supple, symmetric, trachea midline, no masses      Thyroid: Normal, no thyromegaly  Pulmonary   Auscultation of lungs: Clear to auscultation  Cardiovascular   Auscultation of heart: Normal rate and rhythm, normal S1 and S2, no murmurs  Carotid pulses: 2+ bilaterally  Examination of extremities for edema and/or varicosities: Normal     Abdomen   Abdomen: Non-tender, no masses  Liver and spleen: No hepatomegaly or splenomegaly  Stool sample for occult blood: Negative  Lymphatic   Palpation of lymph nodes in neck: No lymphadenopathy  Musculoskeletal   Gait and station: Normal     Neurologic   Cranial nerves: Cranial nerves II-XII intact  Psychiatric   Judgment and insight: Normal     Orientation to person, place, and time: Normal     Recent and remote memory: Intact  Mood and affect: Normal        Health Management  History of Screening for genitourinary condition   *VB-Urinary Incontinence Screen (Dx V81 6 Screen for UI); every 1 year; Last  07Aug2015; Next Due: 30Wwa6336; Active  History of Screening for neurological condition   *VB - Fall Risk Assessment  (Dx V80 09 Screen for Neurologic Disorder); every 1 year; Last 64Tii1070; Next Due: 24Ken6283; Active  Health Maintenance   Medicare Annual Wellness Visit; every 1 year; Next Due: 32TCT4533; Overdue    Signatures   Electronically signed by :  Jenna Moran MD; Jun 6 2016  2:24PM EST                       (Author)

## 2018-01-13 VITALS
DIASTOLIC BLOOD PRESSURE: 62 MMHG | HEART RATE: 68 BPM | SYSTOLIC BLOOD PRESSURE: 120 MMHG | HEIGHT: 63 IN | WEIGHT: 129 LBS | TEMPERATURE: 97 F | BODY MASS INDEX: 22.86 KG/M2 | OXYGEN SATURATION: 98 %

## 2018-01-13 VITALS
BODY MASS INDEX: 22.21 KG/M2 | HEART RATE: 60 BPM | HEIGHT: 63 IN | OXYGEN SATURATION: 99 % | WEIGHT: 125.38 LBS | TEMPERATURE: 97.8 F | SYSTOLIC BLOOD PRESSURE: 110 MMHG | DIASTOLIC BLOOD PRESSURE: 44 MMHG

## 2018-01-13 VITALS
HEART RATE: 74 BPM | OXYGEN SATURATION: 98 % | SYSTOLIC BLOOD PRESSURE: 132 MMHG | BODY MASS INDEX: 22.77 KG/M2 | TEMPERATURE: 97.7 F | DIASTOLIC BLOOD PRESSURE: 58 MMHG | WEIGHT: 128.5 LBS | HEIGHT: 63 IN

## 2018-01-13 NOTE — RESULT NOTES
Verified Results  * NM MYOCARDIAL PERFUSION SPECT (STRESS AND/OR REST) 41WFA5321 07:42AM Janae Mcwilliams     Test Name Result Flag Reference   NM MYOCARDIAL PERFUSION SPECT (STRESS AND/OR REST) (Report)     [facility] [address]   [phone]     Rest/Stress Gated SPECT Myocardial Perfusion Imaging After Exercise     Patient: Emeka Guadalupe   MR number: [id number]   Account number: [de-identified]   : 1935   Age: [de-identified] years   Gender: Female   Status: Outpatient   Location: [address] [place] [address] [facility]   Height: 63 in   Weight: 128 lb   BP: 112/ 70 mmHg     Allergies: OXAPROZIN     Diagnosis: R07 9 - Chest pain, unspecified     Interpreting Physician: Jean Carlos Fortune MD   Referring Physician: Renetta Petersen MD   Primary Physician: Sandeep Padilla MD   Technician: CELIA Palma   RN: Isabel Martin RN   Group: [facility]   Report Prepared by[de-identified] Isabel Martin RN     INDICATIONS: Detection of coronary artery disease  HISTORY: The patient is a [de-identified]year old  female  Chest pain status: recent onset chest pain  Other symptoms: throat irritation Coronary artery disease risk factors: dyslipidemia, hypertension, and post-menopausal state  Cardiovascular history: none significant  Hysterectomy, anxiety, knee arthritis Medications: a calcium channel blocker and a lipid lowering agent  PHYSICAL EXAM: Baseline physical exam screening: normal      REST ECG: Normal sinus rhythm  PROCEDURE: The study was performed at the [address]  Treadmill exercise testing was performed, using the Ayad protocol  Gated SPECT myocardial perfusion imaging was performed after stress  Systolic blood pressure   was 112 mmHg, at the start of the study  Diastolic blood pressure was 70 mmHg, at the start of the study  The heart rate was 59 bpm, at the start of the study  IV double checked       AYAD PROTOCOL:   HR bpm SBP mmHg DBP mmHg Symptoms   Baseline 59 112 70 none   Stage 1 118 140 74 none   Stage 2 131 160 70 mild dyspnea, moderate fatigue   Recovery 1 93 142 72 none   Recovery 2 82 130 70 none   No medications or fluids given  STRESS SUMMARY: Duration of exercise was 5 min  The patient exercised to protocol stage 2  Maximal work rate was 7 METs  Functional capacity was above normal  Maximal heart rate during stress was 131 bpm ( 94 % of maximal predicted heart   rate)  The heart rate response to stress was normal  There was normal resting blood pressure with an appropriate response to stress  The rate-pressure product for the peak heart rate and blood pressure was 27799  There was no chest pain   during stress  The stress test was terminated due to achievement of maximal (symptom limited) exercise  Pre oxygen saturation: 100 %  Peak oxygen saturation: 98 %  The stress ECG was negative for ischemia  There were no stress arrhythmias   or conduction abnormalities  ISOTOPE ADMINISTRATION:   Resting isotope administration Stress isotope administration   Agent Tetrofosmin Tetrofosmin   Dose 10 27 mCi 31 6 mCi   Date 01/19/2017 --   Injection time 08:15 09:29   Imaging time 08:55 10:13     The radiopharmaceutical was injected one minute before the end of exercise  IMAGE PROPERTIES:   Chest: circumference 38 in, C cup  MYOCARDIAL PERFUSION IMAGING:   The image quality was good  Rotating projection images reveal mild breast attenuation  Left ventricular size was normal  The TID ratio was 1 04  PERFUSION DEFECTS:   - There was a small, moderately severe, fixed myocardial perfusion defect of the basal anteroseptal wall likely artifactual from the membranous septum  There were no reversible defects suggestive of ischemia  GATED SPECT:   The calculated left ventricular ejection fraction was over 70 %  There was no left ventricular regional abnormality       SUMMARY:   - Stress results: Duration of exercise was 5 min  Maximal work rate was 7 METs  Target heart rate was achieved  There was no chest pain during stress  - ECG conclusions: The stress ECG was negative for ischemia  - Perfusion imaging: There was a small, moderately severe, fixed myocardial perfusion defect of the basal anteroseptal wall likely artifactual from the membranous septum  There were no reversible defects suggestive of ischemia    - Gated SPECT: The calculated left ventricular ejection fraction was over 70 %  There was no left ventricular regional abnormality  IMPRESSIONS: No evidence of ischemia after maximal exercise without reproduction of symptoms       Prepared and signed by     Bjorn Murcia MD   Signed 01/19/2017 11:30:30

## 2018-01-22 VITALS
HEART RATE: 63 BPM | DIASTOLIC BLOOD PRESSURE: 70 MMHG | OXYGEN SATURATION: 98 % | TEMPERATURE: 98 F | BODY MASS INDEX: 23.4 KG/M2 | HEIGHT: 62 IN | WEIGHT: 127.13 LBS | SYSTOLIC BLOOD PRESSURE: 152 MMHG

## 2018-02-06 RX ORDER — LORAZEPAM 0.5 MG/1
0.5 TABLET ORAL AS NEEDED
COMMUNITY
Start: 2014-03-18 | End: 2018-05-15 | Stop reason: SDUPTHER

## 2018-02-06 RX ORDER — BIOTIN 1 MG
1 TABLET ORAL DAILY
COMMUNITY
Start: 2016-11-01 | End: 2018-05-15 | Stop reason: SDUPTHER

## 2018-02-06 RX ORDER — ACETAMINOPHEN 500 MG
1 TABLET ORAL AS NEEDED
COMMUNITY
Start: 2017-11-28 | End: 2018-05-15 | Stop reason: SDUPTHER

## 2018-02-06 RX ORDER — PANTOPRAZOLE SODIUM 40 MG/1
1 TABLET, DELAYED RELEASE ORAL DAILY
COMMUNITY
Start: 2014-03-18 | End: 2018-02-07 | Stop reason: SDUPTHER

## 2018-02-06 RX ORDER — AMLODIPINE BESYLATE 10 MG/1
1 TABLET ORAL DAILY
COMMUNITY
Start: 2014-03-18 | End: 2018-02-07 | Stop reason: SDUPTHER

## 2018-02-06 RX ORDER — SIMVASTATIN 20 MG
1 TABLET ORAL DAILY
COMMUNITY
Start: 2014-03-18 | End: 2018-02-07 | Stop reason: SDUPTHER

## 2018-02-06 RX ORDER — TRAMADOL HYDROCHLORIDE 50 MG/1
TABLET ORAL
COMMUNITY
Start: 2017-11-28 | End: 2018-05-15 | Stop reason: ALTCHOICE

## 2018-02-07 ENCOUNTER — OFFICE VISIT (OUTPATIENT)
Dept: INTERNAL MEDICINE CLINIC | Facility: CLINIC | Age: 83
End: 2018-02-07
Payer: COMMERCIAL

## 2018-02-07 VITALS
BODY MASS INDEX: 22.25 KG/M2 | HEIGHT: 63 IN | TEMPERATURE: 98.3 F | DIASTOLIC BLOOD PRESSURE: 60 MMHG | WEIGHT: 125.6 LBS | HEART RATE: 74 BPM | SYSTOLIC BLOOD PRESSURE: 140 MMHG | OXYGEN SATURATION: 98 %

## 2018-02-07 DIAGNOSIS — E78.5 HYPERLIPIDEMIA, UNSPECIFIED HYPERLIPIDEMIA TYPE: ICD-10-CM

## 2018-02-07 DIAGNOSIS — Z00.00 MEDICARE ANNUAL WELLNESS VISIT, INITIAL: Primary | ICD-10-CM

## 2018-02-07 DIAGNOSIS — I10 ESSENTIAL HYPERTENSION: ICD-10-CM

## 2018-02-07 DIAGNOSIS — K21.9 GASTROESOPHAGEAL REFLUX DISEASE WITHOUT ESOPHAGITIS: ICD-10-CM

## 2018-02-07 PROCEDURE — G0439 PPPS, SUBSEQ VISIT: HCPCS | Performed by: INTERNAL MEDICINE

## 2018-02-07 RX ORDER — PANTOPRAZOLE SODIUM 40 MG/1
40 TABLET, DELAYED RELEASE ORAL DAILY
Qty: 90 TABLET | Refills: 1 | Status: SHIPPED | OUTPATIENT
Start: 2018-02-07 | End: 2018-05-15 | Stop reason: SDUPTHER

## 2018-02-07 RX ORDER — AMLODIPINE BESYLATE 10 MG/1
10 TABLET ORAL DAILY
Qty: 90 TABLET | Refills: 1 | Status: SHIPPED | OUTPATIENT
Start: 2018-02-07 | End: 2018-05-15 | Stop reason: SDUPTHER

## 2018-02-07 RX ORDER — SIMVASTATIN 20 MG
20 TABLET ORAL DAILY
Qty: 90 TABLET | Refills: 1 | Status: SHIPPED | OUTPATIENT
Start: 2018-02-07 | End: 2018-05-15 | Stop reason: SDUPTHER

## 2018-02-07 NOTE — PROGRESS NOTES
Assessment/Plan:     Medicare wellness visit     patient is up-to-date for immunization   will continue her regular meds as prescribed   suggested melatonin at night for sleep   continue with regular exercise as tolerated     Diagnoses and all orders for this visit:    Medicare annual wellness visit, initial    Essential hypertension  -     AST; Future  -     ALT; Future  -     Basic metabolic panel; Future  -     CBC and differential; Future  -     Lipid panel; Future  -     TSH, 3rd generation with T4 reflex; Future    Other orders  -     amLODIPine (NORVASC) 10 mg tablet; Take 1 tablet by mouth daily  -     LORazepam (ATIVAN) 0 5 mg tablet; Take by mouth  -     pantoprazole (PROTONIX) 40 mg tablet; Take 1 tablet by mouth daily  -     simvastatin (ZOCOR) 20 mg tablet; Take 1 tablet by mouth daily  -     traMADol (ULTRAM) 50 mg tablet; Take by mouth  -     acetaminophen (TYLENOL) 500 mg tablet; Take 1 tablet by mouth  -     Cholecalciferol (VITAMIN D3) 2000 units capsule; Take 1 capsule by mouth daily          Subjective:          Patient ID: Vazquez Lockwood is a 81 y o  female  Patient is here for Medicare wellness visit        The following portions of the patient's history were reviewed and updated as appropriate: allergies, current medications, past family history, past medical history, past social history, past surgical history and problem list     Review of Systems   Constitutional: Negative for appetite change, fatigue and fever  HENT: Positive for hearing loss  Negative for congestion, ear discharge, ear pain, postnasal drip, sinus pressure, sore throat, tinnitus and trouble swallowing  Eyes: Negative for discharge, itching and visual disturbance  Respiratory: Negative for cough and shortness of breath  Cardiovascular: Negative for chest pain and palpitations  Gastrointestinal: Negative for abdominal pain, diarrhea, nausea and vomiting  Endocrine: Negative for cold intolerance and polyuria  Genitourinary: Negative for difficulty urinating, dysuria and urgency  Musculoskeletal: Negative for arthralgias and neck pain  Skin: Negative for rash  Allergic/Immunologic: Negative for environmental allergies  Neurological: Negative for dizziness, weakness and headaches  Psychiatric/Behavioral: Positive for sleep disturbance  Negative for agitation and behavioral problems  The patient is not nervous/anxious            Past Medical History:   Diagnosis Date    Anxiety     Last Assessed:11/24/2014    Appendicitis     Asymptomatic varicose veins     Last Assessed:10/8/2014    Esophageal reflux     Last Assessed:11/3/2014    Fracture of rib     Last Assessed:8/7/2015    Insomnia     Last Assessed:3/18/2014    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)     Last Assessed:3/18/2014         Current Outpatient Prescriptions:     acetaminophen (TYLENOL) 500 mg tablet, Take 1 tablet by mouth, Disp: , Rfl:     amLODIPine (NORVASC) 10 mg tablet, Take 1 tablet by mouth daily, Disp: , Rfl:     Cholecalciferol (VITAMIN D3) 2000 units capsule, Take 1 capsule by mouth daily, Disp: , Rfl:     LORazepam (ATIVAN) 0 5 mg tablet, Take by mouth, Disp: , Rfl:     pantoprazole (PROTONIX) 40 mg tablet, Take 1 tablet by mouth daily, Disp: , Rfl:     simvastatin (ZOCOR) 20 mg tablet, Take 1 tablet by mouth daily, Disp: , Rfl:     traMADol (ULTRAM) 50 mg tablet, Take by mouth, Disp: , Rfl:     Allergies   Allergen Reactions    Daypro [Oxaprozin]     Minocycline        Social History   Past Surgical History:   Procedure Laterality Date    APPENDECTOMY      HYSTERECTOMY      INCISIONAL BREAST BIOPSY       Family History   Problem Relation Age of Onset    Diabetes Mother     Varicose Veins Mother     Diabetes Brother        Objective:  /60 (BP Location: Left arm, Patient Position: Sitting, Cuff Size: Standard)   Pulse 74   Temp 98 3 °F (36 8 °C)   Ht 5' 3" (1 6 m)   Wt 57 kg (125 lb 9 6 oz)   SpO2 98%   BMI 22 25 kg/m²   Body mass index is 22 25 kg/m²  Physical Exam   Constitutional: She appears well-developed  HENT:   Head: Normocephalic  Mouth/Throat: Oropharynx is clear and moist    Eyes: Pupils are equal, round, and reactive to light  No scleral icterus  Neck: Normal range of motion  Neck supple  No tracheal deviation present  No thyromegaly present  Cardiovascular: Normal rate, regular rhythm and normal heart sounds  Pulmonary/Chest: Effort normal and breath sounds normal  No respiratory distress  She exhibits no tenderness  Abdominal: Soft  Bowel sounds are normal  She exhibits no mass  There is no tenderness  Musculoskeletal: Normal range of motion  She exhibits no edema  Lymphadenopathy:     She has no cervical adenopathy  Neurological: She is alert  No cranial nerve deficit  Skin: Skin is warm  Psychiatric: She has a normal mood and affect

## 2018-05-07 ENCOUNTER — OFFICE VISIT (OUTPATIENT)
Dept: INTERNAL MEDICINE CLINIC | Facility: CLINIC | Age: 83
End: 2018-05-07

## 2018-05-07 ENCOUNTER — CLINICAL SUPPORT (OUTPATIENT)
Dept: INTERNAL MEDICINE CLINIC | Facility: CLINIC | Age: 83
End: 2018-05-07
Payer: COMMERCIAL

## 2018-05-07 VITALS
OXYGEN SATURATION: 98 % | SYSTOLIC BLOOD PRESSURE: 134 MMHG | WEIGHT: 126.8 LBS | HEART RATE: 64 BPM | TEMPERATURE: 97.2 F | DIASTOLIC BLOOD PRESSURE: 58 MMHG | BODY MASS INDEX: 21.65 KG/M2 | HEIGHT: 64 IN | RESPIRATION RATE: 16 BRPM

## 2018-05-07 DIAGNOSIS — N30.90 CYSTITIS: Primary | ICD-10-CM

## 2018-05-07 DIAGNOSIS — I10 ESSENTIAL HYPERTENSION: Primary | ICD-10-CM

## 2018-05-07 PROBLEM — M54.50 ACUTE LEFT-SIDED LOW BACK PAIN WITHOUT SCIATICA: Status: ACTIVE | Noted: 2017-11-28

## 2018-05-07 LAB
ALT SERPL W P-5'-P-CCNC: 12 U/L (ref 12–78)
ANION GAP SERPL CALCULATED.3IONS-SCNC: 5 MMOL/L (ref 4–13)
AST SERPL W P-5'-P-CCNC: 15 U/L (ref 5–45)
BASOPHILS # BLD AUTO: 0.05 THOUSANDS/ΜL (ref 0–0.1)
BASOPHILS NFR BLD AUTO: 1 % (ref 0–1)
BUN SERPL-MCNC: 17 MG/DL (ref 5–25)
CALCIUM SERPL-MCNC: 9.9 MG/DL (ref 8.3–10.1)
CHLORIDE SERPL-SCNC: 106 MMOL/L (ref 100–108)
CHOLEST SERPL-MCNC: 202 MG/DL (ref 50–200)
CO2 SERPL-SCNC: 29 MMOL/L (ref 21–32)
CREAT SERPL-MCNC: 0.81 MG/DL (ref 0.6–1.3)
EOSINOPHIL # BLD AUTO: 0.23 THOUSAND/ΜL (ref 0–0.61)
EOSINOPHIL NFR BLD AUTO: 2 % (ref 0–6)
ERYTHROCYTE [DISTWIDTH] IN BLOOD BY AUTOMATED COUNT: 12.8 % (ref 11.6–15.1)
GFR SERPL CREATININE-BSD FRML MDRD: 68 ML/MIN/1.73SQ M
GLUCOSE P FAST SERPL-MCNC: 94 MG/DL (ref 65–99)
HCT VFR BLD AUTO: 44.8 % (ref 34.8–46.1)
HDLC SERPL-MCNC: 66 MG/DL (ref 40–60)
HGB BLD-MCNC: 13.6 G/DL (ref 11.5–15.4)
LDLC SERPL CALC-MCNC: 118 MG/DL (ref 0–100)
LYMPHOCYTES # BLD AUTO: 3.3 THOUSANDS/ΜL (ref 0.6–4.47)
LYMPHOCYTES NFR BLD AUTO: 33 % (ref 14–44)
MCH RBC QN AUTO: 27.5 PG (ref 26.8–34.3)
MCHC RBC AUTO-ENTMCNC: 30.4 G/DL (ref 31.4–37.4)
MCV RBC AUTO: 91 FL (ref 82–98)
MONOCYTES # BLD AUTO: 0.69 THOUSAND/ΜL (ref 0.17–1.22)
MONOCYTES NFR BLD AUTO: 7 % (ref 4–12)
NEUTROPHILS # BLD AUTO: 5.68 THOUSANDS/ΜL (ref 1.85–7.62)
NEUTS SEG NFR BLD AUTO: 57 % (ref 43–75)
NONHDLC SERPL-MCNC: 136 MG/DL
NRBC BLD AUTO-RTO: 0 /100 WBCS
PLATELET # BLD AUTO: 268 THOUSANDS/UL (ref 149–390)
PMV BLD AUTO: 11.2 FL (ref 8.9–12.7)
POTASSIUM SERPL-SCNC: 4.7 MMOL/L (ref 3.5–5.3)
RBC # BLD AUTO: 4.94 MILLION/UL (ref 3.81–5.12)
SL AMB  POCT GLUCOSE, UA: NEGATIVE
SL AMB LEUKOCYTE ESTERASE,UA: ABNORMAL
SL AMB POCT BILIRUBIN,UA: NEGATIVE
SL AMB POCT BLOOD,UA: ABNORMAL
SL AMB POCT CLARITY,UA: ABNORMAL
SL AMB POCT COLOR,UA: ABNORMAL
SL AMB POCT KETONES,UA: NEGATIVE
SL AMB POCT NITRITE,UA: NEGATIVE
SL AMB POCT PH,UA: 5.5
SL AMB POCT SPECIFIC GRAVITY,UA: 1.02
SL AMB POCT URINE PROTEIN: ABNORMAL
SL AMB POCT UROBILINOGEN: ABNORMAL
SODIUM SERPL-SCNC: 140 MMOL/L (ref 136–145)
TRIGL SERPL-MCNC: 89 MG/DL
TSH SERPL DL<=0.05 MIU/L-ACNC: 2 UIU/ML (ref 0.36–3.74)
WBC # BLD AUTO: 9.97 THOUSAND/UL (ref 4.31–10.16)

## 2018-05-07 PROCEDURE — 84443 ASSAY THYROID STIM HORMONE: CPT | Performed by: INTERNAL MEDICINE

## 2018-05-07 PROCEDURE — 85025 COMPLETE CBC W/AUTO DIFF WBC: CPT | Performed by: INTERNAL MEDICINE

## 2018-05-07 PROCEDURE — 81003 URINALYSIS AUTO W/O SCOPE: CPT | Performed by: NURSE PRACTITIONER

## 2018-05-07 PROCEDURE — 84450 TRANSFERASE (AST) (SGOT): CPT | Performed by: INTERNAL MEDICINE

## 2018-05-07 PROCEDURE — 84460 ALANINE AMINO (ALT) (SGPT): CPT | Performed by: INTERNAL MEDICINE

## 2018-05-07 PROCEDURE — 87086 URINE CULTURE/COLONY COUNT: CPT | Performed by: NURSE PRACTITIONER

## 2018-05-07 PROCEDURE — 99213 OFFICE O/P EST LOW 20 MIN: CPT | Performed by: NURSE PRACTITIONER

## 2018-05-07 PROCEDURE — 80048 BASIC METABOLIC PNL TOTAL CA: CPT | Performed by: INTERNAL MEDICINE

## 2018-05-07 PROCEDURE — 80061 LIPID PANEL: CPT | Performed by: INTERNAL MEDICINE

## 2018-05-07 PROCEDURE — 36415 COLL VENOUS BLD VENIPUNCTURE: CPT

## 2018-05-07 RX ORDER — CEPHALEXIN 500 MG/1
500 CAPSULE ORAL EVERY 12 HOURS SCHEDULED
Qty: 14 CAPSULE | Refills: 0 | Status: SHIPPED | OUTPATIENT
Start: 2018-05-07 | End: 2018-05-14

## 2018-05-07 NOTE — ASSESSMENT & PLAN NOTE
Will treat with Keflex 500 mg twice a day for 7 days  Will consider KUB with post void residuals if patient continues with problems after antibiotic course  Advised patient to stay hydrated to flush out kidneys  Take antibiotic with food and have some yogurt to prevent yeast infections

## 2018-05-07 NOTE — PROGRESS NOTES
Assessment/Plan:    Cystitis    Will treat with Keflex 500 mg twice a day for 7 days  Will consider KUB with post void residuals if patient continues with problems after antibiotic course  Advised patient to stay hydrated to flush out kidneys  Take antibiotic with food and have some yogurt to prevent yeast infections  Diagnoses and all orders for this visit:    Cystitis  -     cephalexin (KEFLEX) 500 mg capsule; Take 1 capsule (500 mg total) by mouth every 12 (twelve) hours for 7 days  -     POCT urine dip auto non-scope          Subjective:      Patient ID: Daysi Barragan is a 81 y o  female  Patient presents today for possible UTI  Pt reports for 1 week feeling odd sensation in lower abdomen when urinating and incomplete voiding  Has suprapubic pain, and reports of incomplete emptying  Urinary Tract Infection    This is a new problem  The current episode started in the past 7 days  The problem occurs intermittently  The problem has been gradually worsening  The patient is experiencing no pain  There has been no fever  Associated symptoms include frequency, hesitancy and urgency  Pertinent negatives include no chills, discharge, flank pain, hematuria, nausea, possible pregnancy, sweats or vomiting  Associated symptoms comments: Denies dysuria  She has tried nothing (take tylenol for back pain) for the symptoms  The treatment provided no relief  There is no history of kidney stones, recurrent UTIs or urinary stasis  The following portions of the patient's history were reviewed and updated as appropriate: allergies, current medications, past family history, past medical history, past social history, past surgical history and problem list     Review of Systems   Constitutional: Negative for activity change, appetite change, chills, fatigue and fever  HENT: Negative for ear discharge, ear pain, rhinorrhea, sinus pain, sinus pressure and sore throat      Eyes: Negative for pain, redness and itching  Respiratory: Negative for chest tightness, shortness of breath and wheezing  Cardiovascular: Negative for palpitations  Gastrointestinal: Positive for diarrhea  Negative for abdominal pain, blood in stool, nausea and vomiting  Genitourinary: Positive for frequency, hesitancy and urgency  Negative for flank pain and hematuria  Skin: Negative for rash and wound  Neurological: Negative for dizziness, numbness and headaches           Past Medical History:   Diagnosis Date    Anxiety     Last Assessed:11/24/2014    Appendicitis     Asymptomatic varicose veins     Last Assessed:10/8/2014    Esophageal reflux     Last Assessed:11/3/2014    Fracture of rib     Last Assessed:8/7/2015    Insomnia     Last Assessed:3/18/2014    Syncope     pt reports recent syncope    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)     Last Assessed:3/18/2014         Current Outpatient Prescriptions:     acetaminophen (TYLENOL) 500 mg tablet, Take 1 tablet by mouth, Disp: , Rfl:     amLODIPine (NORVASC) 10 mg tablet, Take 1 tablet (10 mg total) by mouth daily, Disp: 90 tablet, Rfl: 1    Cholecalciferol (VITAMIN D3) 2000 units capsule, Take 1 capsule by mouth daily, Disp: , Rfl:     LORazepam (ATIVAN) 0 5 mg tablet, Take by mouth, Disp: , Rfl:     pantoprazole (PROTONIX) 40 mg tablet, Take 1 tablet (40 mg total) by mouth daily, Disp: 90 tablet, Rfl: 1    simvastatin (ZOCOR) 20 mg tablet, Take 1 tablet (20 mg total) by mouth daily, Disp: 90 tablet, Rfl: 1    cephalexin (KEFLEX) 500 mg capsule, Take 1 capsule (500 mg total) by mouth every 12 (twelve) hours for 7 days, Disp: 14 capsule, Rfl: 0    traMADol (ULTRAM) 50 mg tablet, Take by mouth, Disp: , Rfl:     Allergies   Allergen Reactions    Daypro [Oxaprozin]     Minocycline        Social History   Past Surgical History:   Procedure Laterality Date    APPENDECTOMY      HYSTERECTOMY      INCISIONAL BREAST BIOPSY       Family History   Problem Relation Age of Onset    Diabetes Mother     Varicose Veins Mother     Diabetes Brother        Objective:  /58 (BP Location: Left arm, Patient Position: Sitting, Cuff Size: Adult)   Pulse 64   Temp (!) 97 2 °F (36 2 °C) (Tympanic)   Resp 16   Ht 5' 3 75" (1 619 m)   Wt 57 5 kg (126 lb 12 8 oz)   SpO2 98%   BMI 21 94 kg/m²     Recent Results (from the past 1344 hour(s))   Cholesterol, total    Collection Time: 05/03/18 12:04 PM   Result Value Ref Range    Cholesterol 171 50 - 200 mg/dL            Physical Exam   Constitutional: She is oriented to person, place, and time  She appears well-developed and well-nourished  No distress  HENT:   Head: Normocephalic and atraumatic  Right Ear: External ear normal    Left Ear: External ear normal    Nose: Nose normal    Mouth/Throat: Oropharynx is clear and moist  No oropharyngeal exudate  Eyes: Conjunctivae and EOM are normal  Pupils are equal, round, and reactive to light  Right eye exhibits no discharge  Left eye exhibits no discharge  Neck: Normal range of motion  Neck supple  No thyromegaly present  Cardiovascular: Normal rate, regular rhythm, normal heart sounds and intact distal pulses  Exam reveals no gallop and no friction rub  No murmur heard  Pulmonary/Chest: Effort normal and breath sounds normal  No stridor  No respiratory distress  She has no wheezes  She has no rales  Abdominal: Soft  Bowel sounds are normal  She exhibits no distension  There is no tenderness  Lymphadenopathy:     She has no cervical adenopathy  Neurological: She is alert and oriented to person, place, and time  Skin: Skin is warm and dry  No rash noted  She is not diaphoretic  No erythema  Psychiatric: She has a normal mood and affect   Her behavior is normal  Judgment and thought content normal

## 2018-05-07 NOTE — PATIENT INSTRUCTIONS
Urinary Tract Infection in Women   AMBULATORY CARE:   A urinary tract infection (UTI)  is caused by bacteria that get inside your urinary tract  Most bacteria that enter your urinary tract come out when you urinate  If the bacteria stay in your urinary tract, you may get an infection  Your urinary tract includes your kidneys, ureters, bladder, and urethra  Urine is made in your kidneys, and it flows from the ureters to the bladder  Urine leaves the bladder through the urethra  A UTI is more common in your lower urinary tract, which includes your bladder and urethra  Common symptoms include the following:   · Urinating more often or waking from sleep to urinate    · Pain or burning when you urinate    · Pain or pressure in your lower abdomen     · Urine that smells bad    · Blood in your urine    · Leaking urine  Seek care immediately if:   · You are urinating very little or not at all  · You have a high fever with shaking chills  · You have side or back pain that gets worse  Contact your healthcare provider if:   · You have a fever  · You do not feel better after 2 days of taking antibiotics  · You are vomiting  · You have questions or concerns about your condition or care  Treatment for a UTI  may include medicines to treat a bacterial infection  You may also need medicines to decrease pain and burning, or decrease the urge to urinate often  Prevent a UTI:   · Empty your bladder often  Urinate and empty your bladder as soon as you feel the need  Do not hold your urine for long periods of time  · Wipe from front to back after you urinate or have a bowel movement  This will help prevent germs from getting into your urinary tract through your urethra  · Drink liquids as directed  Ask how much liquid to drink each day and which liquids are best for you  You may need to drink more liquids than usual to help flush out the bacteria  Do not drink alcohol, caffeine, or citrus juices  These can irritate your bladder and increase your symptoms  Your healthcare provider may recommend cranberry juice to help prevent a UTI  · Urinate after you have sex  This can help flush out bacteria passed during sex  · Do not douche or use feminine deodorants  These can change the chemical balance in your vagina  · Change sanitary pads or tampons often  This will help prevent germs from getting into your urinary tract  · Do pelvic muscle exercises often  Pelvic muscle exercises may help you start and stop urinating  Strong pelvic muscles may help you empty your bladder easier  Squeeze these muscles tightly for 5 seconds like you are trying to hold back urine  Then relax for 5 seconds  Gradually work up to squeezing for 10 seconds  Do 3 sets of 15 repetitions a day, or as directed  Follow up with your healthcare provider as directed:  Write down your questions so you remember to ask them during your visits  © 2017 [employer] Information is for End User's use only and may not be sold, redistributed or otherwise used for commercial purposes  All illustrations and images included in CareNotes® are the copyrighted property of [employer]  or [employer]  The above information is an  only  It is not intended as medical advice for individual conditions or treatments  Talk to your doctor, nurse or pharmacist before following any medical regimen to see if it is safe and effective for you

## 2018-05-08 LAB — BACTERIA UR CULT: NORMAL

## 2018-05-15 ENCOUNTER — OFFICE VISIT (OUTPATIENT)
Dept: INTERNAL MEDICINE CLINIC | Age: 83
End: 2018-05-15
Payer: COMMERCIAL

## 2018-05-15 VITALS
OXYGEN SATURATION: 99 % | RESPIRATION RATE: 20 BRPM | DIASTOLIC BLOOD PRESSURE: 64 MMHG | HEIGHT: 63 IN | WEIGHT: 126 LBS | SYSTOLIC BLOOD PRESSURE: 140 MMHG | TEMPERATURE: 98.4 F | HEART RATE: 60 BPM | BODY MASS INDEX: 22.32 KG/M2

## 2018-05-15 DIAGNOSIS — M25.562 PAIN IN BOTH KNEES, UNSPECIFIED CHRONICITY: Primary | ICD-10-CM

## 2018-05-15 DIAGNOSIS — I10 ESSENTIAL HYPERTENSION: ICD-10-CM

## 2018-05-15 DIAGNOSIS — M25.561 PAIN IN BOTH KNEES, UNSPECIFIED CHRONICITY: Primary | ICD-10-CM

## 2018-05-15 DIAGNOSIS — Z00.00 HEALTH CARE MAINTENANCE: ICD-10-CM

## 2018-05-15 DIAGNOSIS — G47.00 INSOMNIA, UNSPECIFIED TYPE: ICD-10-CM

## 2018-05-15 DIAGNOSIS — E78.5 HYPERLIPIDEMIA, UNSPECIFIED HYPERLIPIDEMIA TYPE: ICD-10-CM

## 2018-05-15 DIAGNOSIS — K21.9 GASTROESOPHAGEAL REFLUX DISEASE WITHOUT ESOPHAGITIS: ICD-10-CM

## 2018-05-15 PROCEDURE — 3725F SCREEN DEPRESSION PERFORMED: CPT | Performed by: INTERNAL MEDICINE

## 2018-05-15 PROCEDURE — 99214 OFFICE O/P EST MOD 30 MIN: CPT | Performed by: INTERNAL MEDICINE

## 2018-05-15 PROCEDURE — 1101F PT FALLS ASSESS-DOCD LE1/YR: CPT | Performed by: INTERNAL MEDICINE

## 2018-05-15 RX ORDER — AMLODIPINE BESYLATE 10 MG/1
10 TABLET ORAL DAILY
Qty: 90 TABLET | Refills: 1 | Status: SHIPPED | OUTPATIENT
Start: 2018-05-15 | End: 2018-12-03 | Stop reason: SDUPTHER

## 2018-05-15 RX ORDER — LORAZEPAM 0.5 MG/1
0.5 TABLET ORAL AS NEEDED
Qty: 30 TABLET | Refills: 1 | Status: SHIPPED | OUTPATIENT
Start: 2018-05-15 | End: 2018-12-03 | Stop reason: SDUPTHER

## 2018-05-15 RX ORDER — PANTOPRAZOLE SODIUM 40 MG/1
40 TABLET, DELAYED RELEASE ORAL DAILY
Qty: 90 TABLET | Refills: 1 | Status: SHIPPED | OUTPATIENT
Start: 2018-05-15 | End: 2018-12-03 | Stop reason: SDUPTHER

## 2018-05-15 RX ORDER — BIOTIN 1 MG
1 TABLET ORAL DAILY
Qty: 90 CAPSULE | Refills: 1 | Status: SHIPPED | OUTPATIENT
Start: 2018-05-15 | End: 2018-12-03 | Stop reason: SDUPTHER

## 2018-05-15 RX ORDER — SIMVASTATIN 20 MG
20 TABLET ORAL DAILY
Qty: 90 TABLET | Refills: 1 | Status: SHIPPED | OUTPATIENT
Start: 2018-05-15 | End: 2018-12-03 | Stop reason: SDUPTHER

## 2018-05-15 RX ORDER — ACETAMINOPHEN 500 MG
500 TABLET ORAL EVERY 4 HOURS PRN
Qty: 90 TABLET | Refills: 1 | Status: SHIPPED | OUTPATIENT
Start: 2018-05-15 | End: 2018-12-03 | Stop reason: SDUPTHER

## 2018-05-15 NOTE — PROGRESS NOTES
Assessment/Plan:  1  Essential hypertension   blood pressure is well controlled on present dose of amlodipine  Will continue amlodipine 10 mg daily     2  Hyperlipidemia   lipid profile is in acceptable range  Will continue with simvastatin 20 mg daily     3  Chronic anxiety   continue with lorazepam 0 5 mg daily   4  Cervical spine/knee pain   likely related to osteoarthritis  Advise Tylenol or Advil 2 tablets a day  P r n      5  Hearing loss   advise for hearing test    Diagnoses and all orders for this visit:    Pain in both knees, unspecified chronicity  -     acetaminophen (TYLENOL) 500 mg tablet; Take 1 tablet (500 mg total) by mouth every 4 (four) hours as needed (knee and back pain)    Hyperlipidemia, unspecified hyperlipidemia type  -     simvastatin (ZOCOR) 20 mg tablet; Take 1 tablet (20 mg total) by mouth daily    Gastroesophageal reflux disease without esophagitis  -     pantoprazole (PROTONIX) 40 mg tablet; Take 1 tablet (40 mg total) by mouth daily    Essential hypertension  -     amLODIPine (NORVASC) 10 mg tablet; Take 1 tablet (10 mg total) by mouth daily    Insomnia, unspecified type  -     LORazepam (ATIVAN) 0 5 mg tablet; Take 1 tablet (0 5 mg total) by mouth as needed (help sleep)    Health care maintenance  -     Cholecalciferol (VITAMIN D3) 1000 units CAPS; Take 1 capsule (1,000 Units total) by mouth daily          Subjective:          Patient ID: Faizan Alberto is a 81 y o  female  Hypertension   This is a chronic problem  The problem is controlled  Pertinent negatives include no anxiety, chest pain, headaches, malaise/fatigue, neck pain, palpitations, peripheral edema or shortness of breath  There are no associated agents to hypertension  Risk factors for coronary artery disease include dyslipidemia  Past treatments include calcium channel blockers  There is no history of angina  There is no history of chronic renal disease         The following portions of the patient's history were reviewed and updated as appropriate: allergies, current medications, past family history, past medical history, past social history, past surgical history and problem list     Review of Systems   Constitutional: Negative for fatigue, fever and malaise/fatigue  HENT: Positive for hearing loss  Negative for congestion, ear discharge, ear pain, postnasal drip, sinus pressure, sore throat, tinnitus and trouble swallowing  Eyes: Negative for discharge, itching and visual disturbance  Respiratory: Negative for cough and shortness of breath  Cardiovascular: Negative for chest pain and palpitations  Gastrointestinal: Negative for abdominal pain, diarrhea, nausea and vomiting  Endocrine: Negative for cold intolerance and polyuria  Genitourinary: Negative for difficulty urinating, dysuria and urgency  Musculoskeletal: Positive for arthralgias  Negative for neck pain  Skin: Negative for rash  Allergic/Immunologic: Negative for environmental allergies  Neurological: Negative for dizziness, weakness and headaches  Psychiatric/Behavioral: Negative for agitation and behavioral problems  The patient is not nervous/anxious            Past Medical History:   Diagnosis Date    Anxiety     Last Assessed:11/24/2014    Appendicitis     Asymptomatic varicose veins     Last Assessed:10/8/2014    Esophageal reflux     Last Assessed:11/3/2014    Fracture of rib     Last Assessed:8/7/2015    Insomnia     Last Assessed:3/18/2014    Syncope     pt reports recent syncope    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)     Last Assessed:3/18/2014         Current Outpatient Prescriptions:     acetaminophen (TYLENOL) 500 mg tablet, Take 1 tablet (500 mg total) by mouth every 4 (four) hours as needed (knee and back pain), Disp: 90 tablet, Rfl: 1    amLODIPine (NORVASC) 10 mg tablet, Take 1 tablet (10 mg total) by mouth daily, Disp: 90 tablet, Rfl: 1    Cholecalciferol (VITAMIN D3) 1000 units CAPS, Take 1 capsule (1,000 Units total) by mouth daily, Disp: 90 capsule, Rfl: 1    LORazepam (ATIVAN) 0 5 mg tablet, Take 1 tablet (0 5 mg total) by mouth as needed (help sleep), Disp: 30 tablet, Rfl: 1    pantoprazole (PROTONIX) 40 mg tablet, Take 1 tablet (40 mg total) by mouth daily, Disp: 90 tablet, Rfl: 1    simvastatin (ZOCOR) 20 mg tablet, Take 1 tablet (20 mg total) by mouth daily, Disp: 90 tablet, Rfl: 1    Allergies   Allergen Reactions    Daypro [Oxaprozin]     Minocycline        Social History   Past Surgical History:   Procedure Laterality Date    APPENDECTOMY      HYSTERECTOMY      INCISIONAL BREAST BIOPSY       Family History   Problem Relation Age of Onset    Diabetes Mother     Varicose Veins Mother     Diabetes Brother        Objective:  /64 (BP Location: Left arm, Patient Position: Sitting, Cuff Size: Adult)   Pulse 60   Temp 98 4 °F (36 9 °C) (Tympanic)   Resp 20   Ht 5' 2 56" (1 589 m)   Wt 57 2 kg (126 lb)   SpO2 99% Comment: room air  BMI 22 64 kg/m²   Body mass index is 22 64 kg/m²  Physical Exam   Constitutional: She appears well-developed  HENT:   Head: Normocephalic  Right Ear: External ear normal    Left Ear: External ear normal    Mouth/Throat: Oropharynx is clear and moist    Eyes: Pupils are equal, round, and reactive to light  No scleral icterus  Neck: Normal range of motion  Neck supple  No tracheal deviation present  No thyromegaly present  Cardiovascular: Normal rate, regular rhythm and normal heart sounds  No murmur heard  Pulmonary/Chest: Effort normal and breath sounds normal  No respiratory distress  She exhibits no tenderness  Abdominal: Soft  Bowel sounds are normal  She exhibits no mass  There is no tenderness  Musculoskeletal: Normal range of motion  She exhibits no edema  Lymphadenopathy:     She has no cervical adenopathy  Neurological: She is alert  No cranial nerve deficit  Skin: Skin is warm  No erythema  Psychiatric: She has a normal mood and affect   Her behavior is normal

## 2018-10-01 ENCOUNTER — OFFICE VISIT (OUTPATIENT)
Dept: INTERNAL MEDICINE CLINIC | Facility: CLINIC | Age: 83
End: 2018-10-01
Payer: COMMERCIAL

## 2018-10-01 VITALS
TEMPERATURE: 97.9 F | DIASTOLIC BLOOD PRESSURE: 82 MMHG | HEART RATE: 65 BPM | SYSTOLIC BLOOD PRESSURE: 146 MMHG | OXYGEN SATURATION: 99 % | HEIGHT: 62 IN | WEIGHT: 122.6 LBS | BODY MASS INDEX: 22.56 KG/M2

## 2018-10-01 DIAGNOSIS — E55.9 VITAMIN D DEFICIENCY: ICD-10-CM

## 2018-10-01 DIAGNOSIS — I10 BENIGN ESSENTIAL HYPERTENSION: ICD-10-CM

## 2018-10-01 DIAGNOSIS — Z23 NEED FOR INFLUENZA VACCINATION: Primary | ICD-10-CM

## 2018-10-01 DIAGNOSIS — E78.2 MIXED HYPERLIPIDEMIA: ICD-10-CM

## 2018-10-01 PROCEDURE — 99214 OFFICE O/P EST MOD 30 MIN: CPT | Performed by: INTERNAL MEDICINE

## 2018-10-01 PROCEDURE — 1160F RVW MEDS BY RX/DR IN RCRD: CPT

## 2018-10-01 PROCEDURE — G0008 ADMIN INFLUENZA VIRUS VAC: HCPCS

## 2018-10-01 PROCEDURE — 1036F TOBACCO NON-USER: CPT | Performed by: INTERNAL MEDICINE

## 2018-10-01 PROCEDURE — 90662 IIV NO PRSV INCREASED AG IM: CPT

## 2018-10-01 PROCEDURE — 1160F RVW MEDS BY RX/DR IN RCRD: CPT | Performed by: INTERNAL MEDICINE

## 2018-10-01 NOTE — PROGRESS NOTES
Assessment/Plan:    1  Essential hypertension     blood pressure is well controlled on present dose of amlodipine 10 mg daily  2  Hyperlipidemia   continue with the simvastatin 20 mg daily  3  GERD   she is taking half tablet of pantoprazole daily  And is doing fine  Advised to stop completely and see how she does without the medicine  Continue with diet modification   Diagnoses and all orders for this visit:    Need for influenza vaccination  -     influenza vaccine, 8101-4470, high-dose, PF 0 5 mL, for patients 65 yr+ (FLUZONE HIGH-DOSE)    Benign essential hypertension  -     CBC; Future  -     Comprehensive metabolic panel; Future  -     Lipid Panel with Direct LDL reflex; Future  -     TSH, 3rd generation with Free T4 reflex; Future    Mixed hyperlipidemia  -     Lipid Panel with Direct LDL reflex; Future    Vitamin D deficiency          Subjective:          Patient ID: Gabriel Enrique is a 81 y o  female  Hypertension   This is a chronic problem  The current episode started more than 1 year ago  The problem is controlled  Pertinent negatives include no anxiety, chest pain, headaches, malaise/fatigue, neck pain, palpitations, peripheral edema or shortness of breath  There are no associated agents to hypertension  Risk factors for coronary artery disease include dyslipidemia and post-menopausal state  Past treatments include calcium channel blockers  The current treatment provides significant improvement  There are no compliance problems  There is no history of angina  There is no history of chronic renal disease or a hypertension causing med  The following portions of the patient's history were reviewed and updated as appropriate: allergies, current medications, past family history, past medical history, past social history, past surgical history and problem list     Review of Systems   Constitutional: Negative for fatigue, fever and malaise/fatigue     HENT: Negative for congestion, ear discharge, ear pain, postnasal drip, sinus pressure, sore throat, tinnitus and trouble swallowing  Eyes: Negative for discharge, itching and visual disturbance  Respiratory: Negative for cough and shortness of breath  Cardiovascular: Negative for chest pain and palpitations  Gastrointestinal: Negative for abdominal pain, diarrhea, nausea and vomiting  Endocrine: Negative for cold intolerance and polyuria  Genitourinary: Negative for difficulty urinating, dysuria and urgency  Musculoskeletal: Negative for arthralgias and neck pain  Skin: Negative for rash  Allergic/Immunologic: Negative for environmental allergies  Neurological: Negative for dizziness, weakness and headaches  Psychiatric/Behavioral: Negative for agitation  The patient is not nervous/anxious            Past Medical History:   Diagnosis Date    Anxiety     Last Assessed:11/24/2014    Appendicitis     Asymptomatic varicose veins     Last Assessed:10/8/2014    Esophageal reflux     Last Assessed:11/3/2014    Fracture of rib     Last Assessed:8/7/2015    Insomnia     Last Assessed:3/18/2014    Syncope     pt reports recent syncope    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)     Last Assessed:3/18/2014         Current Outpatient Prescriptions:     acetaminophen (TYLENOL) 500 mg tablet, Take 1 tablet (500 mg total) by mouth every 4 (four) hours as needed (knee and back pain), Disp: 90 tablet, Rfl: 1    amLODIPine (NORVASC) 10 mg tablet, Take 1 tablet (10 mg total) by mouth daily, Disp: 90 tablet, Rfl: 1    Cholecalciferol (VITAMIN D3) 1000 units CAPS, Take 1 capsule (1,000 Units total) by mouth daily, Disp: 90 capsule, Rfl: 1    LORazepam (ATIVAN) 0 5 mg tablet, Take 1 tablet (0 5 mg total) by mouth as needed (help sleep), Disp: 30 tablet, Rfl: 1    pantoprazole (PROTONIX) 40 mg tablet, Take 1 tablet (40 mg total) by mouth daily, Disp: 90 tablet, Rfl: 1    simvastatin (ZOCOR) 20 mg tablet, Take 1 tablet (20 mg total) by mouth daily, Disp: 90 tablet, Rfl: 1    Allergies   Allergen Reactions    Daypro [Oxaprozin]     Minocycline        Social History   Past Surgical History:   Procedure Laterality Date    APPENDECTOMY      HYSTERECTOMY      INCISIONAL BREAST BIOPSY       Family History   Problem Relation Age of Onset    Diabetes Mother     Varicose Veins Mother     Diabetes Brother        Objective:  /82 (BP Location: Left arm, Patient Position: Sitting, Cuff Size: Adult)   Pulse 65   Temp 97 9 °F (36 6 °C) (Oral)   Ht 5' 2 25" (1 581 m)   Wt 55 6 kg (122 lb 9 6 oz)   SpO2 99%   BMI 22 24 kg/m²   Body mass index is 22 24 kg/m²  Physical Exam   Constitutional: She appears well-developed  HENT:   Head: Normocephalic  Right Ear: External ear normal    Left Ear: External ear normal    Mouth/Throat: Oropharynx is clear and moist    Eyes: Pupils are equal, round, and reactive to light  No scleral icterus  Neck: Normal range of motion  Neck supple  No tracheal deviation present  No thyromegaly present  Cardiovascular: Normal rate, regular rhythm and normal heart sounds  No murmur heard  Pulmonary/Chest: Effort normal and breath sounds normal  No respiratory distress  She exhibits no tenderness  Abdominal: Soft  Bowel sounds are normal  She exhibits no mass  There is no tenderness  Musculoskeletal: Normal range of motion  She exhibits no edema  Lymphadenopathy:     She has no cervical adenopathy  Neurological: She is alert  No cranial nerve deficit  Skin: Skin is warm  Psychiatric: She has a normal mood and affect   Her behavior is normal

## 2018-11-12 ENCOUNTER — TELEPHONE (OUTPATIENT)
Dept: INTERNAL MEDICINE CLINIC | Facility: CLINIC | Age: 83
End: 2018-11-12

## 2018-11-12 NOTE — TELEPHONE ENCOUNTER
Patient calling because she would like to know if it is ok for her to get the shingles shot  She states she had one 5 years ago  She states that [facility] is having a clinic and just wants to make sure it is safe for her to get

## 2018-11-12 NOTE — TELEPHONE ENCOUNTER
I called pt  LMOM to return call  Please re-attempt to contact pt this afternoon or tomorrow  I do not seen anything contraindicating the NEW shingles vaccine  The shingles vaccine she received 5 years ago was zostavax  There was a new vaccine developed early this year - shingrix  The CDC recommends that all patients who received the first shingles vaccine, get updated with the new shingles vaccine - shingrix  This is a two part series  Please let me know if any questions/concerns that need a provider to address    Thanks!!

## 2018-11-13 DIAGNOSIS — E78.5 HYPERLIPIDEMIA, UNSPECIFIED HYPERLIPIDEMIA TYPE: ICD-10-CM

## 2018-11-13 DIAGNOSIS — G47.00 INSOMNIA, UNSPECIFIED TYPE: ICD-10-CM

## 2018-11-14 RX ORDER — LORAZEPAM 0.5 MG/1
TABLET ORAL
Qty: 30 TABLET | Refills: 1 | OUTPATIENT
Start: 2018-11-14

## 2018-11-14 RX ORDER — SIMVASTATIN 20 MG
TABLET ORAL
Qty: 90 TABLET | Refills: 1 | OUTPATIENT
Start: 2018-11-14

## 2018-11-29 ENCOUNTER — CLINICAL SUPPORT (OUTPATIENT)
Dept: INTERNAL MEDICINE CLINIC | Facility: CLINIC | Age: 83
End: 2018-11-29
Payer: COMMERCIAL

## 2018-11-29 DIAGNOSIS — I10 BENIGN ESSENTIAL HYPERTENSION: Primary | ICD-10-CM

## 2018-11-29 DIAGNOSIS — E78.2 MIXED HYPERLIPIDEMIA: ICD-10-CM

## 2018-11-29 LAB
ALBUMIN SERPL BCP-MCNC: 3.8 G/DL (ref 3.5–5)
ALP SERPL-CCNC: 74 U/L (ref 46–116)
ALT SERPL W P-5'-P-CCNC: 22 U/L (ref 12–78)
ANION GAP SERPL CALCULATED.3IONS-SCNC: 5 MMOL/L (ref 4–13)
AST SERPL W P-5'-P-CCNC: 19 U/L (ref 5–45)
BILIRUB SERPL-MCNC: 0.39 MG/DL (ref 0.2–1)
BUN SERPL-MCNC: 19 MG/DL (ref 5–25)
CALCIUM SERPL-MCNC: 10.1 MG/DL (ref 8.3–10.1)
CHLORIDE SERPL-SCNC: 105 MMOL/L (ref 100–108)
CHOLEST SERPL-MCNC: 184 MG/DL (ref 50–200)
CO2 SERPL-SCNC: 27 MMOL/L (ref 21–32)
CREAT SERPL-MCNC: 0.88 MG/DL (ref 0.6–1.3)
ERYTHROCYTE [DISTWIDTH] IN BLOOD BY AUTOMATED COUNT: 13.1 % (ref 11.6–15.1)
GFR SERPL CREATININE-BSD FRML MDRD: 61 ML/MIN/1.73SQ M
GLUCOSE P FAST SERPL-MCNC: 95 MG/DL (ref 65–99)
HCT VFR BLD AUTO: 43.5 % (ref 34.8–46.1)
HDLC SERPL-MCNC: 74 MG/DL (ref 40–60)
HGB BLD-MCNC: 13 G/DL (ref 11.5–15.4)
LDLC SERPL CALC-MCNC: 93 MG/DL (ref 0–100)
MCH RBC QN AUTO: 27.8 PG (ref 26.8–34.3)
MCHC RBC AUTO-ENTMCNC: 29.9 G/DL (ref 31.4–37.4)
MCV RBC AUTO: 93 FL (ref 82–98)
PLATELET # BLD AUTO: 231 THOUSANDS/UL (ref 149–390)
PMV BLD AUTO: 11.6 FL (ref 8.9–12.7)
POTASSIUM SERPL-SCNC: 4.2 MMOL/L (ref 3.5–5.3)
PROT SERPL-MCNC: 6.9 G/DL (ref 6.4–8.2)
RBC # BLD AUTO: 4.68 MILLION/UL (ref 3.81–5.12)
SODIUM SERPL-SCNC: 137 MMOL/L (ref 136–145)
TRIGL SERPL-MCNC: 86 MG/DL
TSH SERPL DL<=0.05 MIU/L-ACNC: 1.89 UIU/ML (ref 0.36–3.74)
WBC # BLD AUTO: 7.22 THOUSAND/UL (ref 4.31–10.16)

## 2018-11-29 PROCEDURE — 84443 ASSAY THYROID STIM HORMONE: CPT | Performed by: INTERNAL MEDICINE

## 2018-11-29 PROCEDURE — 80053 COMPREHEN METABOLIC PANEL: CPT | Performed by: INTERNAL MEDICINE

## 2018-11-29 PROCEDURE — 85027 COMPLETE CBC AUTOMATED: CPT | Performed by: INTERNAL MEDICINE

## 2018-11-29 PROCEDURE — 36415 COLL VENOUS BLD VENIPUNCTURE: CPT

## 2018-11-29 PROCEDURE — 80061 LIPID PANEL: CPT | Performed by: INTERNAL MEDICINE

## 2018-12-03 ENCOUNTER — OFFICE VISIT (OUTPATIENT)
Dept: INTERNAL MEDICINE CLINIC | Age: 83
End: 2018-12-03
Payer: COMMERCIAL

## 2018-12-03 VITALS
WEIGHT: 124.6 LBS | OXYGEN SATURATION: 98 % | SYSTOLIC BLOOD PRESSURE: 142 MMHG | DIASTOLIC BLOOD PRESSURE: 74 MMHG | BODY MASS INDEX: 22.93 KG/M2 | HEIGHT: 62 IN | HEART RATE: 58 BPM | TEMPERATURE: 97 F

## 2018-12-03 DIAGNOSIS — Z00.00 HEALTH CARE MAINTENANCE: ICD-10-CM

## 2018-12-03 DIAGNOSIS — G47.00 INSOMNIA, UNSPECIFIED TYPE: ICD-10-CM

## 2018-12-03 DIAGNOSIS — K21.9 GASTROESOPHAGEAL REFLUX DISEASE WITHOUT ESOPHAGITIS: ICD-10-CM

## 2018-12-03 DIAGNOSIS — M25.562 PAIN IN BOTH KNEES, UNSPECIFIED CHRONICITY: ICD-10-CM

## 2018-12-03 DIAGNOSIS — E78.5 HYPERLIPIDEMIA, UNSPECIFIED HYPERLIPIDEMIA TYPE: ICD-10-CM

## 2018-12-03 DIAGNOSIS — M25.561 PAIN IN BOTH KNEES, UNSPECIFIED CHRONICITY: ICD-10-CM

## 2018-12-03 DIAGNOSIS — I10 ESSENTIAL HYPERTENSION: ICD-10-CM

## 2018-12-03 PROCEDURE — 4040F PNEUMOC VAC/ADMIN/RCVD: CPT | Performed by: INTERNAL MEDICINE

## 2018-12-03 PROCEDURE — 1160F RVW MEDS BY RX/DR IN RCRD: CPT | Performed by: INTERNAL MEDICINE

## 2018-12-03 PROCEDURE — 3008F BODY MASS INDEX DOCD: CPT | Performed by: INTERNAL MEDICINE

## 2018-12-03 PROCEDURE — 99214 OFFICE O/P EST MOD 30 MIN: CPT | Performed by: INTERNAL MEDICINE

## 2018-12-03 RX ORDER — ACETAMINOPHEN 500 MG
500 TABLET ORAL EVERY 4 HOURS PRN
Qty: 90 TABLET | Refills: 1 | Status: SHIPPED | OUTPATIENT
Start: 2018-12-03

## 2018-12-03 RX ORDER — BIOTIN 1 MG
1 TABLET ORAL DAILY
Qty: 90 CAPSULE | Refills: 1 | Status: SHIPPED | OUTPATIENT
Start: 2018-12-03 | End: 2019-06-03 | Stop reason: SDUPTHER

## 2018-12-03 RX ORDER — SIMVASTATIN 20 MG
20 TABLET ORAL DAILY
Qty: 90 TABLET | Refills: 1 | Status: SHIPPED | OUTPATIENT
Start: 2018-12-03 | End: 2019-06-03 | Stop reason: SDUPTHER

## 2018-12-03 RX ORDER — PANTOPRAZOLE SODIUM 40 MG/1
40 TABLET, DELAYED RELEASE ORAL DAILY
Qty: 90 TABLET | Refills: 1 | Status: SHIPPED | OUTPATIENT
Start: 2018-12-03 | End: 2019-06-03 | Stop reason: SDUPTHER

## 2018-12-03 RX ORDER — AMLODIPINE BESYLATE 10 MG/1
10 TABLET ORAL DAILY
Qty: 90 TABLET | Refills: 1 | Status: SHIPPED | OUTPATIENT
Start: 2018-12-03 | End: 2019-06-03 | Stop reason: SDUPTHER

## 2018-12-03 RX ORDER — LORAZEPAM 0.5 MG/1
0.5 TABLET ORAL AS NEEDED
Qty: 30 TABLET | Refills: 2 | Status: SHIPPED | OUTPATIENT
Start: 2018-12-03 | End: 2019-03-12 | Stop reason: SDUPTHER

## 2018-12-03 NOTE — PROGRESS NOTES
Assessment/Plan:    1  Hypertension  Blood pressure is well controlled on present dose of amlodipine 10 mg daily  Will continue present dose  2  Hyperlipidemia  Lipid profile is in acceptable range with simvastatin 20 mg daily  3  Gastroesophageal reflux disease  Continue with the Protonix 40 mg daily  She try to take 20 mg but she have a recurrence of symptoms  Advised to continue 40 mg daily       Diagnoses and all orders for this visit:    Hyperlipidemia, unspecified hyperlipidemia type  -     simvastatin (ZOCOR) 20 mg tablet; Take 1 tablet (20 mg total) by mouth daily    Gastroesophageal reflux disease without esophagitis  -     pantoprazole (PROTONIX) 40 mg tablet; Take 1 tablet (40 mg total) by mouth daily    Essential hypertension  -     amLODIPine (NORVASC) 10 mg tablet; Take 1 tablet (10 mg total) by mouth daily    Health care maintenance  -     Cholecalciferol (VITAMIN D3) 1000 units CAPS; Take 1 capsule (1,000 Units total) by mouth daily    Pain in both knees, unspecified chronicity  -     acetaminophen (TYLENOL) 500 mg tablet; Take 1 tablet (500 mg total) by mouth every 4 (four) hours as needed (knee and back pain)    Insomnia, unspecified type  -     LORazepam (ATIVAN) 0 5 mg tablet; Take 1 tablet (0 5 mg total) by mouth as needed (help sleep)          Subjective:          Patient ID: Lizbet Jorgensen is a 81 y o  female  Hypertension   This is a chronic problem  The current episode started more than 1 year ago  The problem is controlled  Pertinent negatives include no anxiety, chest pain, headaches, malaise/fatigue, neck pain, palpitations, peripheral edema or shortness of breath  There are no associated agents to hypertension  Risk factors for coronary artery disease include dyslipidemia and post-menopausal state  Past treatments include calcium channel blockers  There are no compliance problems  There is no history of angina, CVA or PVD         The following portions of the patient's history were reviewed and updated as appropriate: allergies, current medications, past family history, past medical history, past social history, past surgical history and problem list     Review of Systems   Constitutional: Negative for fatigue, fever and malaise/fatigue  HENT: Negative for congestion, ear discharge, ear pain, postnasal drip, sinus pressure, sore throat, tinnitus and trouble swallowing  Eyes: Negative for discharge, itching and visual disturbance  Respiratory: Negative for cough and shortness of breath  Cardiovascular: Negative for chest pain and palpitations  Gastrointestinal: Negative for abdominal pain, diarrhea, nausea and vomiting  Endocrine: Negative for cold intolerance and polyuria  Genitourinary: Negative for difficulty urinating, dysuria and urgency  Musculoskeletal: Negative for arthralgias and neck pain  Skin: Negative for rash  Allergic/Immunologic: Negative for environmental allergies  Neurological: Negative for dizziness, weakness and headaches  Psychiatric/Behavioral: Negative for agitation and behavioral problems  The patient is not nervous/anxious            Past Medical History:   Diagnosis Date    Anxiety     Last Assessed:11/24/2014    Appendicitis     Asymptomatic varicose veins     Last Assessed:10/8/2014    Esophageal reflux     Last Assessed:11/3/2014    Fracture of rib     Last Assessed:8/7/2015    Insomnia     Last Assessed:3/18/2014    Syncope     pt reports recent syncope    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)     Last Assessed:3/18/2014         Current Outpatient Prescriptions:     acetaminophen (TYLENOL) 500 mg tablet, Take 1 tablet (500 mg total) by mouth every 4 (four) hours as needed (knee and back pain), Disp: 90 tablet, Rfl: 1    amLODIPine (NORVASC) 10 mg tablet, Take 1 tablet (10 mg total) by mouth daily, Disp: 90 tablet, Rfl: 1    Cholecalciferol (VITAMIN D3) 1000 units CAPS, Take 1 capsule (1,000 Units total) by mouth daily, Disp: 90 capsule, Rfl: 1    LORazepam (ATIVAN) 0 5 mg tablet, Take 1 tablet (0 5 mg total) by mouth as needed (help sleep), Disp: 30 tablet, Rfl: 2    pantoprazole (PROTONIX) 40 mg tablet, Take 1 tablet (40 mg total) by mouth daily, Disp: 90 tablet, Rfl: 1    simvastatin (ZOCOR) 20 mg tablet, Take 1 tablet (20 mg total) by mouth daily, Disp: 90 tablet, Rfl: 1    Allergies   Allergen Reactions    Daypro [Oxaprozin]     Minocycline        Social History   Past Surgical History:   Procedure Laterality Date    APPENDECTOMY      HYSTERECTOMY      INCISIONAL BREAST BIOPSY       Family History   Problem Relation Age of Onset    Diabetes Mother     Varicose Veins Mother     Diabetes Brother        Objective:  /74 (BP Location: Left arm, Patient Position: Sitting, Cuff Size: Standard)   Pulse 58   Temp (!) 97 °F (36 1 °C) (Tympanic)   Ht 5' 2 25" (1 581 m)   Wt 56 5 kg (124 lb 9 6 oz)   SpO2 98%   BMI 22 61 kg/m²   Body mass index is 22 61 kg/m²  Physical Exam   Constitutional: She appears well-developed  HENT:   Head: Normocephalic  Right Ear: External ear normal    Left Ear: External ear normal    Mouth/Throat: Oropharynx is clear and moist    Eyes: Pupils are equal, round, and reactive to light  No scleral icterus  Neck: Normal range of motion  Neck supple  No tracheal deviation present  No thyromegaly present  Cardiovascular: Normal rate, regular rhythm and normal heart sounds  No murmur heard  Pulmonary/Chest: Effort normal and breath sounds normal  No respiratory distress  She exhibits no tenderness  Abdominal: Soft  Bowel sounds are normal  She exhibits no mass  There is no tenderness  Genitourinary: Rectal exam shows guaiac negative stool  Musculoskeletal: Normal range of motion  Lymphadenopathy:     She has no cervical adenopathy  Neurological: She is alert  No cranial nerve deficit  Coordination normal    Skin: Skin is warm     Psychiatric: She has a normal mood and affect

## 2019-03-12 ENCOUNTER — OFFICE VISIT (OUTPATIENT)
Dept: INTERNAL MEDICINE CLINIC | Facility: CLINIC | Age: 84
End: 2019-03-12
Payer: COMMERCIAL

## 2019-03-12 VITALS
HEART RATE: 64 BPM | TEMPERATURE: 97.7 F | OXYGEN SATURATION: 98 % | HEIGHT: 63 IN | DIASTOLIC BLOOD PRESSURE: 70 MMHG | WEIGHT: 124.6 LBS | BODY MASS INDEX: 22.08 KG/M2 | SYSTOLIC BLOOD PRESSURE: 148 MMHG

## 2019-03-12 DIAGNOSIS — Z00.00 MEDICARE ANNUAL WELLNESS VISIT, INITIAL: ICD-10-CM

## 2019-03-12 DIAGNOSIS — G47.00 INSOMNIA, UNSPECIFIED TYPE: ICD-10-CM

## 2019-03-12 DIAGNOSIS — E55.9 VITAMIN D DEFICIENCY: ICD-10-CM

## 2019-03-12 DIAGNOSIS — E78.2 MIXED HYPERLIPIDEMIA: ICD-10-CM

## 2019-03-12 DIAGNOSIS — I10 BENIGN ESSENTIAL HYPERTENSION: Primary | ICD-10-CM

## 2019-03-12 PROCEDURE — 1160F RVW MEDS BY RX/DR IN RCRD: CPT | Performed by: INTERNAL MEDICINE

## 2019-03-12 PROCEDURE — 1036F TOBACCO NON-USER: CPT | Performed by: INTERNAL MEDICINE

## 2019-03-12 PROCEDURE — 1125F AMNT PAIN NOTED PAIN PRSNT: CPT | Performed by: INTERNAL MEDICINE

## 2019-03-12 PROCEDURE — G0438 PPPS, INITIAL VISIT: HCPCS | Performed by: INTERNAL MEDICINE

## 2019-03-12 PROCEDURE — 1170F FXNL STATUS ASSESSED: CPT | Performed by: INTERNAL MEDICINE

## 2019-03-12 PROCEDURE — 99214 OFFICE O/P EST MOD 30 MIN: CPT | Performed by: INTERNAL MEDICINE

## 2019-03-12 RX ORDER — LORAZEPAM 0.5 MG/1
0.5 TABLET ORAL AS NEEDED
Qty: 30 TABLET | Refills: 0 | Status: SHIPPED | OUTPATIENT
Start: 2019-03-12 | End: 2019-06-03 | Stop reason: SDUPTHER

## 2019-03-12 NOTE — PROGRESS NOTES
Assessment and Plan:    Problem List Items Addressed This Visit     None        Health Maintenance Due   Topic Date Due    SLP PLAN OF CARE  1935    DTaP,Tdap,and Td Vaccines (1 - Tdap) 11/01/2005    Medicare Annual Wellness Visit (AWV)  02/07/2019         HPI:  Abel Dao is a 81 y o  female here for her the annual Medicare wellness visit    Patient Active Problem List   Diagnosis    Benign essential hypertension    Mixed hyperlipidemia    Vitamin D deficiency    Acute left-sided low back pain without sciatica    Hyperchloremia    Cystitis     Past Medical History:   Diagnosis Date    Anxiety     Last Assessed:11/24/2014    Appendicitis     Asymptomatic varicose veins     Last Assessed:10/8/2014    Esophageal reflux     Last Assessed:11/3/2014    Fracture of rib     Last Assessed:8/7/2015    Insomnia     Last Assessed:3/18/2014    Syncope     pt reports recent syncope    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 ([facility] [address] )     Last Assessed:3/18/2014     Past Surgical History:   Procedure Laterality Date    APPENDECTOMY      HYSTERECTOMY      INCISIONAL BREAST BIOPSY       Family History   Problem Relation Age of Onset    Diabetes Mother     Varicose Veins Mother     Diabetes Brother      Social History     Tobacco Use   Smoking Status Never Smoker   Smokeless Tobacco Never Used     Social History     Substance and Sexual Activity   Alcohol Use Yes    Frequency: 2-3 times a week    Drinks per session: 3 or 4    Binge frequency: Never    Comment: socially      Social History     Substance and Sexual Activity   Drug Use No       Current Outpatient Medications   Medication Sig Dispense Refill    acetaminophen (TYLENOL) 500 mg tablet Take 1 tablet (500 mg total) by mouth every 4 (four) hours as needed (knee and back pain) 90 tablet 1    amLODIPine (NORVASC) 10 mg tablet Take 1 tablet (10 mg total) by mouth daily 90 tablet 1    Cholecalciferol (VITAMIN D3) 1000 units CAPS Take 1 capsule (1,000 Units total) by mouth daily 90 capsule 1    LORazepam (ATIVAN) 0 5 mg tablet Take 1 tablet (0 5 mg total) by mouth as needed (help sleep) 30 tablet 2    pantoprazole (PROTONIX) 40 mg tablet Take 1 tablet (40 mg total) by mouth daily 90 tablet 1    simvastatin (ZOCOR) 20 mg tablet Take 1 tablet (20 mg total) by mouth daily 90 tablet 1     No current facility-administered medications for this visit  Allergies   Allergen Reactions    Daypro [Oxaprozin]     Minocycline      Immunization History   Administered Date(s) Administered    INFLUENZA 10/30/2005, 12/12/2006, 10/05/2007, 10/23/2009, 10/01/2010, 10/03/2011, 10/10/2011, 10/01/2013, 10/02/2014, 09/18/2015, 10/06/2016    Influenza Split High Dose Preservative Free IM 10/02/2014, 09/18/2015, 10/06/2016    Influenza TIV (IM) 10/01/2013    Influenza, high dose seasonal 0 5 mL 10/01/2018    Pneumococcal Conjugate 13-Valent 04/29/2016    Pneumococcal Polysaccharide PPV23 11/01/2004    Td (adult), Unspecified 10/31/2005    Zoster 11/01/2013       Patient Care Team:  Nida Cardoza MD as PCP - General  Mary Ellen Lara PA-C    Medicare Screening Tests and Risk Assessments: Ang Arechiga is here for her Subsequent Wellness visit  Last Medicare Wellness visit information reviewed, patient interviewed, no change since last AWV  Health Risk Assessment:  Patient rates overall health as good  Patient feels that their physical health rating is Same  Eyesight was rated as Slightly worse  Hearing was rated as Slightly worse  Patient feels that their emotional and mental health rating is Same  Pain experienced by patient in the last 7 days has been Some  Patient's pain rating has been 1/10  Patient states that she has experienced no weight loss or gain in last 6 months  Emotional/Mental Health:  Patient has been feeling nervous/anxious  PHQ-9 Depression Screening:    Frequency of the following problems over the past two weeks:      1  Little interest or pleasure in doing things: 0 - not at all      2  Feeling down, depressed, or hopeless: 0 - not at all  PHQ-2 Score: 0          Broken Bones/Falls: Fall Risk Assessment:    In the past year, patient has experienced: No history of falling in past year          Bladder/Bowel:  Patient has not leaked urine accidently in the last six months  Patient reports no loss of bowel control  Immunizations:  Patient has had a flu vaccination within the last year  Patient has received a pneumonia shot  Patient has received a shingles shot  Patient has received tetanus/diphtheria shot  Date of tetanus/diphtheria shot: 10/1/2009    Home Safety:  Patient does not have trouble with stairs inside or outside of their home  Patient currently reports that there are no safety hazards present in home, working smoke alarms, working carbon monoxide detectors  Preventative Screenings:   Breast cancer screening performed, 7/10/2018  no colon cancer screen completed, cholesterol screen completed, 11/1/2018  glaucoma eye exam completed,     Nutrition:  Current diet: Regular and No Added Salt with servings of the following:    Medications:  Patient is not currently taking any over-the-counter supplements  Patient is able to manage medications  Lifestyle Choices:  Patient reports no tobacco use  Patient has not smoked or used tobacco in the past   Patient reports alcohol use  Alcohol use per week: 3-4  Patient drives a vehicle  Patient wears seat belt  Current level of exercise of physical activity described by patient as: dance twice a week          Activities of Daily Living:  Can get out of bed by his or her self, able to dress self, able to make own meals, able to do own shopping, able to bathe self, can do own laundry/housekeeping, can manage own money, pay bills and track expenses    Previous Hospitalizations:  No hospitalization or ED visit in past 12 months        Advanced Directives:  Patient has decided on a power of   Patient has spoken to designated power of   Patient has completed advanced directive  Preventative Screening/Counseling:      Cardiovascular:      General: Risks and Benefits Discussed and Screening Current      Counseling: Healthy Diet and Healthy Weight          Diabetes:      General: Risks and Benefits Discussed and Screening Current      Counseling: Healthy Diet and Healthy Weight          Colorectal Cancer:      General: Risks and Benefits Discussed, Screening Current and Patient Declines      Counseling: high fiber diet          Breast Cancer:      General: Risks and Benefits Discussed and Screening Current          Cervical Cancer:      General: Screening Not Indicated          Osteoporosis:      General: Risks and Benefits Discussed and Screening Current      Counseling: Calcium and Vitamin D Intake          AAA:      General: Screening Not Indicated          Glaucoma:      General: Risks and Benefits Discussed and Screening Current          HIV:      General: Screening Not Indicated          Hepatitis C:      General: Screening Not Indicated        Advanced Directives:   Patient has living will for healthcare, has durable POA for healthcare, patient has an advanced directive  Immunizations:  Patient reviewed and up to date      Influenza: Risks & Benefits Discussed and Influenza UTD This Year      Pneumococcal: Risks & Benefits Discussed and Lifetime Vaccine Completed      Shingrix: Risks & Benefits Discussed and Shingrix Vaccine UTD      Hepatitis B (Low risk patients): Series Not Indicated      Hepatitis B (Medium to high risk patients): Risks & Benefits Discussed      Zostavax: Zostavax Vaccine UTD      TDAP: Risks & Benefits Discussed      Other Preventative Counseling (Non-Medicare): Fall Prevention, Increase physical activity, Nutrition Counseling and Skin self-exam      Referrals:   Additional Comments: none  Physical Exam   Constitutional: She appears well-developed  HENT:   Head: Normocephalic  Right Ear: External ear normal    Left Ear: External ear normal    Mouth/Throat: Oropharynx is clear and moist    Eyes: Pupils are equal, round, and reactive to light  No scleral icterus  Neck: Normal range of motion  Neck supple  No tracheal deviation present  No thyromegaly present  Cardiovascular: Normal rate, regular rhythm and normal heart sounds  Pulmonary/Chest: Effort normal and breath sounds normal  No respiratory distress  She exhibits no tenderness  Abdominal: Soft  Bowel sounds are normal  She exhibits no mass  There is no tenderness  Musculoskeletal: Normal range of motion  Lymphadenopathy:     She has no cervical adenopathy  Neurological: She is alert  No cranial nerve deficit  Skin: Skin is warm  Psychiatric: She has a normal mood and affect

## 2019-03-12 NOTE — PROGRESS NOTES
Assessment/Plan:    BMI Counseling: Body mass index is 22 07 kg/m²  Discussed the patient's BMI with her  The BMI is in the acceptable range  1  Essential hypertension  Blood pressure is well controlled on present regimen will continue the same dose of amlodipine    2  Hyperlipidemia  Will check lipid profile before next visit  Continue with the simvastatin 20 mg daily    3  Insomnia  She used a T1 0 5 mg on p r n  Basis which is working effectively for her  Diagnoses and all orders for this visit:    Benign essential hypertension    Mixed hyperlipidemia    Vitamin D deficiency    Medicare annual wellness visit, initial    Insomnia, unspecified type          Subjective:          Patient ID: Miriam Chao is a 81 y o  female  Hypertension   This is a chronic problem  The current episode started more than 1 year ago  The problem is controlled  Pertinent negatives include no anxiety, chest pain, headaches, neck pain, palpitations or shortness of breath  There are no associated agents to hypertension  Risk factors for coronary artery disease include dyslipidemia  Past treatments include calcium channel blockers and lifestyle changes  The current treatment provides significant improvement  There is no history of angina or CVA  There is no history of chronic renal disease  The following portions of the patient's history were reviewed and updated as appropriate: allergies, current medications, past family history, past medical history, past social history, past surgical history and problem list     Review of Systems   Constitutional: Negative for fatigue and fever  HENT: Negative for congestion, ear discharge, ear pain, postnasal drip, sinus pressure, sore throat, tinnitus and trouble swallowing  Eyes: Negative for discharge, itching and visual disturbance  Respiratory: Negative for cough and shortness of breath  Cardiovascular: Negative for chest pain and palpitations     Gastrointestinal: Negative for abdominal pain, diarrhea, nausea and vomiting  Endocrine: Negative for cold intolerance and polyuria  Genitourinary: Negative for difficulty urinating, dysuria and urgency  Musculoskeletal: Positive for arthralgias  Negative for neck pain  Skin: Negative for rash  Allergic/Immunologic: Negative for environmental allergies  Neurological: Negative for dizziness, weakness and headaches  Psychiatric/Behavioral: The patient is not nervous/anxious            Past Medical History:   Diagnosis Date    Anxiety     Last Assessed:11/24/2014    Appendicitis     Asymptomatic varicose veins     Last Assessed:10/8/2014    Esophageal reflux     Last Assessed:11/3/2014    Fracture of rib     Last Assessed:8/7/2015    Insomnia     Last Assessed:3/18/2014    Syncope     pt reports recent syncope    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)     Last Assessed:3/18/2014         Current Outpatient Medications:     acetaminophen (TYLENOL) 500 mg tablet, Take 1 tablet (500 mg total) by mouth every 4 (four) hours as needed (knee and back pain), Disp: 90 tablet, Rfl: 1    amLODIPine (NORVASC) 10 mg tablet, Take 1 tablet (10 mg total) by mouth daily, Disp: 90 tablet, Rfl: 1    Cholecalciferol (VITAMIN D3) 1000 units CAPS, Take 1 capsule (1,000 Units total) by mouth daily, Disp: 90 capsule, Rfl: 1    LORazepam (ATIVAN) 0 5 mg tablet, Take 1 tablet (0 5 mg total) by mouth as needed (help sleep), Disp: 30 tablet, Rfl: 2    pantoprazole (PROTONIX) 40 mg tablet, Take 1 tablet (40 mg total) by mouth daily, Disp: 90 tablet, Rfl: 1    simvastatin (ZOCOR) 20 mg tablet, Take 1 tablet (20 mg total) by mouth daily, Disp: 90 tablet, Rfl: 1    Allergies   Allergen Reactions    Daypro [Oxaprozin]     Minocycline        Social History   Past Surgical History:   Procedure Laterality Date    APPENDECTOMY      HYSTERECTOMY      INCISIONAL BREAST BIOPSY       Family History   Problem Relation Age of Onset    Diabetes Mother     Varicose Veins Mother     Diabetes Brother        Objective:  /70 (BP Location: Left arm, Patient Position: Sitting, Cuff Size: Adult)   Pulse 64   Temp 97 7 °F (36 5 °C) (Oral)   Ht 5' 3" (1 6 m) Comment: with shoes on  Wt 56 5 kg (124 lb 9 6 oz) Comment: with shoes on  SpO2 98% Comment: room air  BMI 22 07 kg/m²   Body mass index is 22 07 kg/m²  Physical Exam   Constitutional: She appears well-developed  HENT:   Head: Normocephalic  Mouth/Throat: Oropharynx is clear and moist    Eyes: Pupils are equal, round, and reactive to light  No scleral icterus  Neck: Normal range of motion  Neck supple  No tracheal deviation present  No thyromegaly present  Cardiovascular: Normal rate, regular rhythm and normal heart sounds  No murmur heard  Pulmonary/Chest: Effort normal and breath sounds normal  No respiratory distress  She exhibits no tenderness  Abdominal: Soft  Bowel sounds are normal  She exhibits no mass  There is no tenderness  Musculoskeletal: Normal range of motion  Lymphadenopathy:     She has no cervical adenopathy  Neurological: She is alert  No cranial nerve deficit  Skin: Skin is warm  Psychiatric: She has a normal mood and affect   Her behavior is normal

## 2019-05-29 ENCOUNTER — CLINICAL SUPPORT (OUTPATIENT)
Dept: INTERNAL MEDICINE CLINIC | Facility: CLINIC | Age: 84
End: 2019-05-29
Payer: COMMERCIAL

## 2019-05-29 DIAGNOSIS — E78.2 MIXED HYPERLIPIDEMIA: ICD-10-CM

## 2019-05-29 DIAGNOSIS — I10 BENIGN ESSENTIAL HYPERTENSION: Primary | ICD-10-CM

## 2019-05-29 LAB
ANION GAP SERPL CALCULATED.3IONS-SCNC: 8 MMOL/L (ref 4–13)
BUN SERPL-MCNC: 18 MG/DL (ref 5–25)
CALCIUM SERPL-MCNC: 9.3 MG/DL (ref 8.3–10.1)
CHLORIDE SERPL-SCNC: 108 MMOL/L (ref 100–108)
CHOLEST SERPL-MCNC: 185 MG/DL (ref 50–200)
CO2 SERPL-SCNC: 27 MMOL/L (ref 21–32)
CREAT SERPL-MCNC: 0.86 MG/DL (ref 0.6–1.3)
ERYTHROCYTE [DISTWIDTH] IN BLOOD BY AUTOMATED COUNT: 12.7 % (ref 11.6–15.1)
GFR SERPL CREATININE-BSD FRML MDRD: 63 ML/MIN/1.73SQ M
GLUCOSE P FAST SERPL-MCNC: 102 MG/DL (ref 65–99)
HCT VFR BLD AUTO: 44.1 % (ref 34.8–46.1)
HDLC SERPL-MCNC: 70 MG/DL (ref 40–60)
HGB BLD-MCNC: 13.2 G/DL (ref 11.5–15.4)
LDLC SERPL CALC-MCNC: 97 MG/DL (ref 0–100)
MCH RBC QN AUTO: 27.7 PG (ref 26.8–34.3)
MCHC RBC AUTO-ENTMCNC: 29.9 G/DL (ref 31.4–37.4)
MCV RBC AUTO: 93 FL (ref 82–98)
PLATELET # BLD AUTO: 242 THOUSANDS/UL (ref 149–390)
PMV BLD AUTO: 11.4 FL (ref 8.9–12.7)
POTASSIUM SERPL-SCNC: 4.3 MMOL/L (ref 3.5–5.3)
RBC # BLD AUTO: 4.77 MILLION/UL (ref 3.81–5.12)
SODIUM SERPL-SCNC: 143 MMOL/L (ref 136–145)
TRIGL SERPL-MCNC: 91 MG/DL
WBC # BLD AUTO: 8.62 THOUSAND/UL (ref 4.31–10.16)

## 2019-05-29 PROCEDURE — 36415 COLL VENOUS BLD VENIPUNCTURE: CPT

## 2019-05-29 PROCEDURE — 85027 COMPLETE CBC AUTOMATED: CPT | Performed by: INTERNAL MEDICINE

## 2019-05-29 PROCEDURE — 80061 LIPID PANEL: CPT | Performed by: INTERNAL MEDICINE

## 2019-05-29 PROCEDURE — 80048 BASIC METABOLIC PNL TOTAL CA: CPT | Performed by: INTERNAL MEDICINE

## 2019-06-03 ENCOUNTER — OFFICE VISIT (OUTPATIENT)
Dept: INTERNAL MEDICINE CLINIC | Facility: CLINIC | Age: 84
End: 2019-06-03
Payer: COMMERCIAL

## 2019-06-03 VITALS
HEART RATE: 65 BPM | WEIGHT: 123.6 LBS | SYSTOLIC BLOOD PRESSURE: 138 MMHG | BODY MASS INDEX: 22.74 KG/M2 | HEIGHT: 62 IN | DIASTOLIC BLOOD PRESSURE: 70 MMHG | OXYGEN SATURATION: 99 % | TEMPERATURE: 97.7 F

## 2019-06-03 DIAGNOSIS — E78.2 MIXED HYPERLIPIDEMIA: ICD-10-CM

## 2019-06-03 DIAGNOSIS — Z00.00 HEALTH CARE MAINTENANCE: ICD-10-CM

## 2019-06-03 DIAGNOSIS — I10 ESSENTIAL HYPERTENSION: ICD-10-CM

## 2019-06-03 DIAGNOSIS — I10 BENIGN ESSENTIAL HYPERTENSION: ICD-10-CM

## 2019-06-03 DIAGNOSIS — E55.9 VITAMIN D DEFICIENCY: ICD-10-CM

## 2019-06-03 DIAGNOSIS — Z23 NEED FOR TDAP VACCINATION: Primary | ICD-10-CM

## 2019-06-03 DIAGNOSIS — E78.5 HYPERLIPIDEMIA, UNSPECIFIED HYPERLIPIDEMIA TYPE: ICD-10-CM

## 2019-06-03 DIAGNOSIS — G47.00 INSOMNIA, UNSPECIFIED TYPE: ICD-10-CM

## 2019-06-03 DIAGNOSIS — K21.9 GASTROESOPHAGEAL REFLUX DISEASE WITHOUT ESOPHAGITIS: ICD-10-CM

## 2019-06-03 PROBLEM — N30.90 CYSTITIS: Status: RESOLVED | Noted: 2018-05-07 | Resolved: 2019-06-03

## 2019-06-03 PROCEDURE — 99214 OFFICE O/P EST MOD 30 MIN: CPT | Performed by: INTERNAL MEDICINE

## 2019-06-03 PROCEDURE — 3078F DIAST BP <80 MM HG: CPT | Performed by: INTERNAL MEDICINE

## 2019-06-03 PROCEDURE — 3075F SYST BP GE 130 - 139MM HG: CPT | Performed by: INTERNAL MEDICINE

## 2019-06-03 PROCEDURE — 1160F RVW MEDS BY RX/DR IN RCRD: CPT | Performed by: INTERNAL MEDICINE

## 2019-06-03 PROCEDURE — 1036F TOBACCO NON-USER: CPT | Performed by: INTERNAL MEDICINE

## 2019-06-03 RX ORDER — AMLODIPINE BESYLATE 10 MG/1
10 TABLET ORAL DAILY
Qty: 90 TABLET | Refills: 1 | Status: SHIPPED | OUTPATIENT
Start: 2019-06-03 | End: 2020-03-23 | Stop reason: SDUPTHER

## 2019-06-03 RX ORDER — SIMVASTATIN 20 MG
20 TABLET ORAL DAILY
Qty: 90 TABLET | Refills: 1 | Status: SHIPPED | OUTPATIENT
Start: 2019-06-03 | End: 2019-11-25 | Stop reason: SINTOL

## 2019-06-03 RX ORDER — BIOTIN 1 MG
1 TABLET ORAL DAILY
Qty: 90 CAPSULE | Refills: 1 | Status: SHIPPED | OUTPATIENT
Start: 2019-06-03 | End: 2019-08-26 | Stop reason: SDUPTHER

## 2019-06-03 RX ORDER — PANTOPRAZOLE SODIUM 40 MG/1
40 TABLET, DELAYED RELEASE ORAL DAILY
Qty: 90 TABLET | Refills: 1 | Status: SHIPPED | OUTPATIENT
Start: 2019-06-03 | End: 2019-08-26 | Stop reason: HOSPADM

## 2019-06-03 RX ORDER — LORAZEPAM 0.5 MG/1
0.5 TABLET ORAL AS NEEDED
Qty: 30 TABLET | Refills: 0 | Status: SHIPPED | OUTPATIENT
Start: 2019-06-03 | End: 2019-08-26 | Stop reason: SDUPTHER

## 2019-08-26 ENCOUNTER — OFFICE VISIT (OUTPATIENT)
Dept: INTERNAL MEDICINE CLINIC | Facility: CLINIC | Age: 84
End: 2019-08-26
Payer: COMMERCIAL

## 2019-08-26 VITALS
TEMPERATURE: 97.7 F | HEIGHT: 62 IN | WEIGHT: 123.6 LBS | OXYGEN SATURATION: 99 % | BODY MASS INDEX: 22.74 KG/M2 | DIASTOLIC BLOOD PRESSURE: 60 MMHG | SYSTOLIC BLOOD PRESSURE: 122 MMHG | HEART RATE: 68 BPM

## 2019-08-26 DIAGNOSIS — I10 BENIGN ESSENTIAL HYPERTENSION: ICD-10-CM

## 2019-08-26 DIAGNOSIS — Z00.00 HEALTH CARE MAINTENANCE: ICD-10-CM

## 2019-08-26 DIAGNOSIS — K21.9 GASTROESOPHAGEAL REFLUX DISEASE WITHOUT ESOPHAGITIS: Primary | ICD-10-CM

## 2019-08-26 DIAGNOSIS — E78.2 MIXED HYPERLIPIDEMIA: ICD-10-CM

## 2019-08-26 DIAGNOSIS — I83.93 ASYMPTOMATIC VARICOSE VEINS OF BOTH LOWER EXTREMITIES: ICD-10-CM

## 2019-08-26 DIAGNOSIS — G47.00 INSOMNIA, UNSPECIFIED TYPE: ICD-10-CM

## 2019-08-26 PROCEDURE — 3074F SYST BP LT 130 MM HG: CPT | Performed by: INTERNAL MEDICINE

## 2019-08-26 PROCEDURE — 99214 OFFICE O/P EST MOD 30 MIN: CPT | Performed by: INTERNAL MEDICINE

## 2019-08-26 PROCEDURE — 1036F TOBACCO NON-USER: CPT | Performed by: INTERNAL MEDICINE

## 2019-08-26 PROCEDURE — 1160F RVW MEDS BY RX/DR IN RCRD: CPT | Performed by: INTERNAL MEDICINE

## 2019-08-26 RX ORDER — LORAZEPAM 0.5 MG/1
0.5 TABLET ORAL AS NEEDED
Qty: 30 TABLET | Refills: 0 | Status: SHIPPED | OUTPATIENT
Start: 2019-08-26 | End: 2020-02-03 | Stop reason: SDUPTHER

## 2019-08-26 RX ORDER — BIOTIN 1 MG
1 TABLET ORAL DAILY
Qty: 90 CAPSULE | Refills: 1 | Status: SHIPPED | OUTPATIENT
Start: 2019-08-26 | End: 2019-10-29 | Stop reason: ALTCHOICE

## 2019-08-26 RX ORDER — FAMOTIDINE 20 MG/1
20 TABLET, FILM COATED ORAL DAILY
Qty: 90 TABLET | Refills: 1 | Status: SHIPPED | OUTPATIENT
Start: 2019-08-26 | End: 2019-09-26

## 2019-08-26 NOTE — PROGRESS NOTES
Assessment/Plan:    1  Gastroesophageal reflux disease  Louisa Leader discontinued proton about a month ago  Will start her on Pepcid 20 mg HS  Continue with low acid  diet       2  Essential hypertension  Blood pressure is well controlled on present regimen    3  Hyperlipidemia  Will continue with the simvastatin 20 mg daily    4  Vitamin-D deficiency  Continue with vitamin D3 supplementation     Subjective:          Patient ID: Dagoberto Rangel is a 81 y o  female  Patient is here for regular follow-up  She stop the Protonix about a month ago and now she noticed she is having more sore throat specially when she wake up in the morning  The following portions of the patient's history were reviewed and updated as appropriate: allergies, current medications, past family history, past medical history, past social history, past surgical history and problem list     Review of Systems   Constitutional: Negative for fatigue and fever  HENT: Negative for congestion, ear discharge, ear pain, postnasal drip, sinus pressure, sore throat, tinnitus and trouble swallowing  Eyes: Negative for discharge, itching and visual disturbance  Respiratory: Negative for cough and shortness of breath  Cardiovascular: Negative for chest pain and palpitations  Gastrointestinal: Negative for abdominal pain, diarrhea, nausea and vomiting  Endocrine: Negative for cold intolerance and polyuria  Genitourinary: Negative for difficulty urinating, dysuria and urgency  Musculoskeletal: Negative for arthralgias and neck pain  Skin: Negative for rash  Allergic/Immunologic: Negative for environmental allergies  Neurological: Negative for dizziness, weakness and headaches  Psychiatric/Behavioral: Negative for agitation  The patient is not nervous/anxious            Past Medical History:   Diagnosis Date    Anxiety     Last Assessed:11/24/2014    Appendicitis     Asymptomatic varicose veins     Last Assessed:10/8/2014    Esophageal reflux     Last Assessed:11/3/2014    Fracture of rib     Last Assessed:8/7/2015    Insomnia     Last Assessed:3/18/2014    Syncope     pt reports recent syncope    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)     Last Assessed:3/18/2014         Current Outpatient Medications:     acetaminophen (TYLENOL) 500 mg tablet, Take 1 tablet (500 mg total) by mouth every 4 (four) hours as needed (knee and back pain), Disp: 90 tablet, Rfl: 1    amLODIPine (NORVASC) 10 mg tablet, Take 1 tablet (10 mg total) by mouth daily, Disp: 90 tablet, Rfl: 1    Cholecalciferol (VITAMIN D3) 1000 units CAPS, Take 1 capsule (1,000 Units total) by mouth daily, Disp: 90 capsule, Rfl: 1    LORazepam (ATIVAN) 0 5 mg tablet, Take 1 tablet (0 5 mg total) by mouth as needed (help sleep), Disp: 30 tablet, Rfl: 0    simvastatin (ZOCOR) 20 mg tablet, Take 1 tablet (20 mg total) by mouth daily, Disp: 90 tablet, Rfl: 1    famotidine (PEPCID) 20 mg tablet, Take 1 tablet (20 mg total) by mouth daily, Disp: 90 tablet, Rfl: 1    Allergies   Allergen Reactions    Daypro [Oxaprozin]      Unknown allergic reaction    Minocycline Other (See Comments)     Loss of taste       Social History   Past Surgical History:   Procedure Laterality Date    APPENDECTOMY      HYSTERECTOMY      INCISIONAL BREAST BIOPSY       Family History   Problem Relation Age of Onset    Diabetes Mother     Varicose Veins Mother     Diabetes Brother        Objective:  /60 (BP Location: Left arm, Patient Position: Sitting, Cuff Size: Adult)   Pulse 68   Temp 97 7 °F (36 5 °C) (Oral)   Ht 5' 2" (1 575 m) Comment: with shoes on  Wt 56 1 kg (123 lb 9 6 oz) Comment: with shoes on  SpO2 99% Comment: room air  BMI 22 61 kg/m²   Body mass index is 22 61 kg/m²  Physical Exam   Constitutional: She appears well-developed  HENT:   Head: Normocephalic     Left Ear: External ear normal    Mouth/Throat: Oropharynx is clear and moist  Moderate amount of dry wax noted in external auditory canal right ear   Eyes: Pupils are equal, round, and reactive to light  No scleral icterus  Neck: Normal range of motion  Neck supple  No tracheal deviation present  No thyromegaly present  Cardiovascular: Normal rate, regular rhythm and normal heart sounds  No murmur heard  Bilateral lower extremity superficial varicose veins present   Pulmonary/Chest: Effort normal and breath sounds normal  No respiratory distress  She exhibits no tenderness  Abdominal: Soft  Bowel sounds are normal  She exhibits no mass  There is no tenderness  Musculoskeletal: Normal range of motion  Lymphadenopathy:     She has no cervical adenopathy  Neurological: She is alert  No cranial nerve deficit  Skin: Skin is warm  Psychiatric: She has a normal mood and affect

## 2019-09-26 ENCOUNTER — CONSULT (OUTPATIENT)
Dept: INTERNAL MEDICINE CLINIC | Facility: CLINIC | Age: 84
End: 2019-09-26
Payer: COMMERCIAL

## 2019-09-26 VITALS
OXYGEN SATURATION: 99 % | SYSTOLIC BLOOD PRESSURE: 138 MMHG | WEIGHT: 123.8 LBS | TEMPERATURE: 98.1 F | HEART RATE: 65 BPM | HEIGHT: 63 IN | BODY MASS INDEX: 21.93 KG/M2 | DIASTOLIC BLOOD PRESSURE: 66 MMHG

## 2019-09-26 DIAGNOSIS — Z01.818 PREOP EXAMINATION: Primary | ICD-10-CM

## 2019-09-26 DIAGNOSIS — H25.9 AGE-RELATED CATARACT OF BOTH EYES, UNSPECIFIED AGE-RELATED CATARACT TYPE: ICD-10-CM

## 2019-09-26 DIAGNOSIS — I10 BENIGN ESSENTIAL HYPERTENSION: ICD-10-CM

## 2019-09-26 DIAGNOSIS — E78.2 MIXED HYPERLIPIDEMIA: ICD-10-CM

## 2019-09-26 PROBLEM — M54.50 ACUTE LEFT-SIDED LOW BACK PAIN WITHOUT SCIATICA: Status: RESOLVED | Noted: 2017-11-28 | Resolved: 2019-09-26

## 2019-09-26 PROCEDURE — 99214 OFFICE O/P EST MOD 30 MIN: CPT | Performed by: INTERNAL MEDICINE

## 2019-09-26 NOTE — PROGRESS NOTES
Subjective: Marian Chapman is a 81y o  year old female who presents to the office today for a preoperative consultation at the request of surgeon Dr Emmanuel Young who plans on performing left eye cataract repair followed by the right on September 30 and October 18, respectively  This consultation is requested for the specific conditions prompting preoperative evaluation (i e  because of potential affect on operative risk)  Planned anesthesia: local  The patient has no known anesthesia issues  Patients bleeding risk: no recent abnormal bleeding  Patient does not have objections to receiving blood products if needed  Patient is able to walk 4 blocks without symptoms  Patient is able to walk up 2 flights of stairs without symptoms  Significant past medical history includes GERD, hypertension, hyperlipidemia, and vitamin-D deficiency  Tobacco use:  Negative  Alcohol use:  Social  Illicit drug use:  Negative    Symptoms:   Easy bleeding: no  Easy bruising: no  Frequent nose bleeds: no  Chest pain: no  Cough: no  Dyspnea on exertion:no  Edema: no  Palpitations: no  Wheezing: no    Living situation: Patient lives with  in a residential home  Her  will be caring for her after surgery  hedoes not have post-op concerns       The following portions of the patient's history were reviewed and updated as appropriate: allergies, current medications, past family history, past medical history, past social history, past surgical history and problem list     Review of Systems  Review of Systems      Past Medical History:   Diagnosis Date    Anxiety     Last Assessed:11/24/2014    Appendicitis     Asymptomatic varicose veins     Last Assessed:10/8/2014    Esophageal reflux     Last Assessed:11/3/2014    Fracture of rib     Last Assessed:8/7/2015    Insomnia     Last Assessed:3/18/2014    Syncope     pt reports recent syncope    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 ([facility] [address] ) Last Assessed:3/18/2014     Past Surgical History:   Procedure Laterality Date    APPENDECTOMY      HYSTERECTOMY      INCISIONAL BREAST BIOPSY       Social History     Tobacco Use    Smoking status: Never Smoker    Smokeless tobacco: Never Used   Substance Use Topics    Alcohol use: Yes     Frequency: 2-3 times a week     Drinks per session: 3 or 4     Binge frequency: Never     Comment: socially    Drug use: No     Family History   Problem Relation Age of Onset    Diabetes Mother     Varicose Veins Mother     Diabetes Brother      Daypro [oxaprozin] and Minocycline  Current Outpatient Medications   Medication Sig Dispense Refill    acetaminophen (TYLENOL) 500 mg tablet Take 1 tablet (500 mg total) by mouth every 4 (four) hours as needed (knee and back pain) 90 tablet 1    amLODIPine (NORVASC) 10 mg tablet Take 1 tablet (10 mg total) by mouth daily 90 tablet 1    Cholecalciferol (VITAMIN D3) 1000 units CAPS Take 1 capsule (1,000 Units total) by mouth daily 90 capsule 1    LORazepam (ATIVAN) 0 5 mg tablet Take 1 tablet (0 5 mg total) by mouth as needed (help sleep) 30 tablet 0    simvastatin (ZOCOR) 20 mg tablet Take 1 tablet (20 mg total) by mouth daily 90 tablet 1     No current facility-administered medications for this visit  Objective:       /66 (BP Location: Left arm, Patient Position: Sitting, Cuff Size: Standard)   Pulse 65   Temp 98 1 °F (36 7 °C) (Oral)   Ht 5' 2 5" (1 588 m) Comment: sandals on  Wt 56 2 kg (123 lb 12 8 oz) Comment: sandals on  SpO2 99%   BMI 22 28 kg/m²   Physical Exam         Lab Review   No visits with results within 2 Month(s) from this visit     Latest known visit with results is:   Clinical Support on 05/29/2019   Component Date Value    Sodium 05/29/2019 143     Potassium 05/29/2019 4 3     Chloride 05/29/2019 108     CO2 05/29/2019 27     ANION GAP 05/29/2019 8     BUN 05/29/2019 18     Creatinine 05/29/2019 0 86     Glucose, Fasting 05/29/2019 102*    Calcium 05/29/2019 9 3     eGFR 05/29/2019 63     WBC 05/29/2019 8 62     RBC 05/29/2019 4 77     Hemoglobin 05/29/2019 13 2     Hematocrit 05/29/2019 44 1     MCV 05/29/2019 93     MCH 05/29/2019 27 7     MCHC 05/29/2019 29 9*    RDW 05/29/2019 12 7     Platelets 90/33/1757 242     MPV 05/29/2019 11 4     Cholesterol 05/29/2019 185     Triglycerides 05/29/2019 91     HDL, Direct 05/29/2019 70*    LDL Calculated 05/29/2019 97         Assessment:     81 y o  female with planned surgery as above  Known risk factors for perioperative complications: None      Cardiac Risk Estimation: Low risk for this low risk procedure    Yadira Moore is cleared from a cardiovascular standpoint to proceed with surgery  she is at a low risk from a cardiovascular standpoint at this time without any additional cardiac testing  Reevaluation needed if he should present with symptoms prior to surgery  Plan:  Preoperative workup as follows none  Change in medication regimen before surgery: none, continue medication regimen including morning of surgery, with sip of water

## 2019-10-15 DIAGNOSIS — E78.5 HYPERLIPIDEMIA, UNSPECIFIED HYPERLIPIDEMIA TYPE: ICD-10-CM

## 2019-10-15 RX ORDER — SIMVASTATIN 20 MG
TABLET ORAL
Qty: 90 TABLET | Refills: 1 | OUTPATIENT
Start: 2019-10-15

## 2019-10-29 ENCOUNTER — TRANSITIONAL CARE MANAGEMENT (OUTPATIENT)
Dept: INTERNAL MEDICINE CLINIC | Facility: CLINIC | Age: 84
End: 2019-10-29

## 2019-10-29 ENCOUNTER — OFFICE VISIT (OUTPATIENT)
Dept: INTERNAL MEDICINE CLINIC | Age: 84
End: 2019-10-29
Payer: COMMERCIAL

## 2019-10-29 VITALS
OXYGEN SATURATION: 100 % | BODY MASS INDEX: 22.61 KG/M2 | HEIGHT: 63 IN | TEMPERATURE: 97.3 F | HEART RATE: 56 BPM | SYSTOLIC BLOOD PRESSURE: 140 MMHG | WEIGHT: 127.6 LBS | DIASTOLIC BLOOD PRESSURE: 68 MMHG

## 2019-10-29 DIAGNOSIS — I10 BENIGN ESSENTIAL HYPERTENSION: Primary | ICD-10-CM

## 2019-10-29 DIAGNOSIS — E78.2 MIXED HYPERLIPIDEMIA: ICD-10-CM

## 2019-10-29 DIAGNOSIS — N18.30 CKD (CHRONIC KIDNEY DISEASE) STAGE 3, GFR 30-59 ML/MIN (HCC): ICD-10-CM

## 2019-10-29 DIAGNOSIS — E55.9 VITAMIN D DEFICIENCY: ICD-10-CM

## 2019-10-29 PROCEDURE — 99496 TRANSJ CARE MGMT HIGH F2F 7D: CPT | Performed by: INTERNAL MEDICINE

## 2019-10-29 RX ORDER — PREDNISOLONE ACETATE 10 MG/ML
1 SUSPENSION/ DROPS OPHTHALMIC 2 TIMES DAILY
COMMUNITY
End: 2020-01-06 | Stop reason: ALTCHOICE

## 2019-10-29 RX ORDER — GATIFLOXACIN 5 MG/ML
1 SOLUTION/ DROPS OPHTHALMIC 4 TIMES DAILY
COMMUNITY
End: 2020-01-06 | Stop reason: ALTCHOICE

## 2019-10-29 RX ORDER — PANTOPRAZOLE SODIUM 40 MG/1
40 TABLET, DELAYED RELEASE ORAL DAILY
COMMUNITY
End: 2020-02-03 | Stop reason: SDUPTHER

## 2019-10-29 NOTE — PROGRESS NOTES
Assessment/Plan:    1  Status post acute kidney injury  Will repeat renal function before next visit  Advised to drink plenty of fluid at least 2 and half later in 24 hour  2  Essential hypertension  Blood pressure is well controlled on present regimen    3  Vitamin-D deficient  Continue with vitamin D supplementation      Diagnoses and all orders for this visit:    Benign essential hypertension  -     Basic metabolic panel; Future    Mixed hyperlipidemia    Vitamin D deficiency    CKD (chronic kidney disease) stage 3, GFR 30-59 ml/min ([facility])  -     Basic metabolic panel; Future    Other orders  -     Cholecalciferol (VITAMIN D3) 400 units CAPS; Take 2 capsules by mouth daily          Subjective:          Patient ID: Lili Wang is a 81 y o  female  TCM Call (since 9/28/2019)     Date and time call was made  10/29/2019 11:58 AM    Hospital care reviewed  Records reviewed    Patient was hospitialized at  [facility]    Date of Admission  10/25/19    Date of discharge  10/26/19    Diagnosis  chest pain; ASHLY; Chest pain, moderate coronary risk    Disposition  Home    Were the patients medications reviewed and updated  No <img src='C:FILES (X86)    Current Symptoms  None      TCM Call (since 9/28/2019)     Post hospital issues  None    Should patient be enrolled in anticoag monitoring? No    Scheduled for follow up? Yes    Did you obtain your prescribed medications  Yes    Do you need help managing your prescriptions or medications  No    Is transportation to your appointment needed  No    I have advised the patient to call PCP with any new or worsening symptoms  Amy Crispin Gilford MA          Patient was admitted to [facility] him numbers with bilateral arm pain chest pressure and diaphoresis  She was admitted over night for observation  Three sets of troponin was negative  She also underwent a nuclear stress test which was unremarkable    Also found to have a dehydration/acute kidney injury  Since discharge from the hospital she is feeling stable  No new symptoms  The following portions of the patient's history were reviewed and updated as appropriate: allergies, current medications, past family history, past medical history, past social history, past surgical history and problem list     Review of Systems   Constitutional: Positive for fatigue  Negative for fever  HENT: Negative for congestion, ear discharge, ear pain, postnasal drip, sinus pressure, sore throat, tinnitus and trouble swallowing  Eyes: Negative for discharge, itching and visual disturbance  Respiratory: Negative for cough and shortness of breath  Cardiovascular: Negative for chest pain and palpitations  Gastrointestinal: Negative for abdominal pain, diarrhea, nausea and vomiting  Endocrine: Negative for cold intolerance and polyuria  Genitourinary: Negative for difficulty urinating, dysuria and urgency  Musculoskeletal: Negative for arthralgias and neck pain  Skin: Negative for rash  Allergic/Immunologic: Negative for environmental allergies  Neurological: Negative for dizziness, weakness and headaches  Psychiatric/Behavioral: The patient is not nervous/anxious            Past Medical History:   Diagnosis Date    Anxiety     Last Assessed:11/24/2014    Appendicitis     Asymptomatic varicose veins     Last Assessed:10/8/2014    Esophageal reflux     Last Assessed:11/3/2014    Fracture of rib     Last Assessed:8/7/2015    Insomnia     Last Assessed:3/18/2014    Syncope     pt reports recent syncope    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)     Last Assessed:3/18/2014         Current Outpatient Medications:     acetaminophen (TYLENOL) 500 mg tablet, Take 1 tablet (500 mg total) by mouth every 4 (four) hours as needed (knee and back pain), Disp: 90 tablet, Rfl: 1    amLODIPine (NORVASC) 10 mg tablet, Take 1 tablet (10 mg total) by mouth daily, Disp: 90 tablet, Rfl: 1    Cholecalciferol (VITAMIN D3) 400 units CAPS, Take 2 capsules by mouth daily, Disp: , Rfl:     gatifloxacin (ZYMAXID) 0 5 %, Apply 1 drop to eye 4 (four) times a day, Disp: , Rfl:     LORazepam (ATIVAN) 0 5 mg tablet, Take 1 tablet (0 5 mg total) by mouth as needed (help sleep), Disp: 30 tablet, Rfl: 0    pantoprazole (PROTONIX) 40 mg tablet, Take 40 mg by mouth daily, Disp: , Rfl:     prednisoLONE acetate (PRED FORTE) 1 % ophthalmic suspension, Apply 1 drop to eye 2 (two) times a day, Disp: , Rfl:     simvastatin (ZOCOR) 20 mg tablet, Take 1 tablet (20 mg total) by mouth daily, Disp: 90 tablet, Rfl: 1    Allergies   Allergen Reactions    Daypro [Oxaprozin]      Unknown allergic reaction    Minocycline Other (See Comments)     Loss of taste       Social History   Past Surgical History:   Procedure Laterality Date    APPENDECTOMY      CATARACT EXTRACTION, BILATERAL      HYSTERECTOMY      INCISIONAL BREAST BIOPSY       Family History   Problem Relation Age of Onset    Diabetes Mother     Varicose Veins Mother     Diabetes Brother        Objective:  /68 (BP Location: Left arm, Patient Position: Sitting, Cuff Size: Standard)   Pulse 56   Temp (!) 97 3 °F (36 3 °C) (Tympanic)   Ht 5' 2 5" (1 588 m)   Wt 57 9 kg (127 lb 9 6 oz)   SpO2 100%   BMI 22 97 kg/m²   Body mass index is 22 97 kg/m²  Physical Exam   Constitutional: She appears well-developed  HENT:   Head: Normocephalic  Right Ear: External ear normal    Left Ear: External ear normal    Mouth/Throat: Oropharynx is clear and moist    Eyes: Pupils are equal, round, and reactive to light  No scleral icterus  Neck: Normal range of motion  Neck supple  No tracheal deviation present  No thyromegaly present  Cardiovascular: Normal rate, regular rhythm and normal heart sounds  Pulmonary/Chest: Effort normal and breath sounds normal  No respiratory distress  She exhibits no tenderness  Abdominal: Soft   Bowel sounds are normal  She exhibits no mass  There is no tenderness  Musculoskeletal: Normal range of motion  Lymphadenopathy:     She has no cervical adenopathy  Neurological: She is alert  No cranial nerve deficit  Coordination normal    Skin: Skin is warm  No erythema  Psychiatric: She has a normal mood and affect

## 2019-11-04 ENCOUNTER — TELEPHONE (OUTPATIENT)
Dept: INTERNAL MEDICINE CLINIC | Facility: CLINIC | Age: 84
End: 2019-11-04

## 2019-11-04 NOTE — TELEPHONE ENCOUNTER
Discussed her mom's condition over phone and advise to see cardiologist   she preferred [facility]

## 2019-11-04 NOTE — TELEPHONE ENCOUNTER
Pt's daughter called in asking if Dr Byrd could give her a call back  Would not go into detail and did not want to speak with a nurse  Thank you!

## 2019-11-19 ENCOUNTER — CLINICAL SUPPORT (OUTPATIENT)
Dept: INTERNAL MEDICINE CLINIC | Facility: CLINIC | Age: 84
End: 2019-11-19
Payer: COMMERCIAL

## 2019-11-19 DIAGNOSIS — I10 BENIGN ESSENTIAL HYPERTENSION: Primary | ICD-10-CM

## 2019-11-19 DIAGNOSIS — E78.2 MIXED HYPERLIPIDEMIA: ICD-10-CM

## 2019-11-19 PROCEDURE — 36415 COLL VENOUS BLD VENIPUNCTURE: CPT

## 2019-11-20 LAB
BUN SERPL-MCNC: 14 MG/DL (ref 7–25)
BUN/CREAT SERPL: 15 (CALC) (ref 6–22)
CALCIUM SERPL-MCNC: 9.9 MG/DL (ref 8.6–10.4)
CHLORIDE SERPL-SCNC: 106 MMOL/L (ref 98–110)
CHOLEST SERPL-MCNC: 181 MG/DL
CHOLEST/HDLC SERPL: 2.6 (CALC)
CO2 SERPL-SCNC: 27 MMOL/L (ref 20–32)
CREAT SERPL-MCNC: 0.93 MG/DL (ref 0.6–0.88)
ERYTHROCYTE [DISTWIDTH] IN BLOOD BY AUTOMATED COUNT: 12.7 % (ref 11–15)
GLUCOSE SERPL-MCNC: 104 MG/DL (ref 65–99)
HCT VFR BLD AUTO: 40.4 % (ref 35–45)
HDLC SERPL-MCNC: 69 MG/DL
HGB BLD-MCNC: 13.1 G/DL (ref 11.7–15.5)
LDLC SERPL CALC-MCNC: 93 MG/DL (CALC)
MCH RBC QN AUTO: 28.1 PG (ref 27–33)
MCHC RBC AUTO-ENTMCNC: 32.4 G/DL (ref 32–36)
MCV RBC AUTO: 86.7 FL (ref 80–100)
NONHDLC SERPL-MCNC: 112 MG/DL (CALC)
PLATELET # BLD AUTO: 269 THOUSAND/UL (ref 140–400)
PMV BLD REES-ECKER: 10.9 FL (ref 7.5–12.5)
POTASSIUM SERPL-SCNC: 4.4 MMOL/L (ref 3.5–5.3)
RBC # BLD AUTO: 4.66 MILLION/UL (ref 3.8–5.1)
SL AMB EGFR AFRICAN AMERICAN: 66 ML/MIN/1.73M2
SL AMB EGFR NON AFRICAN AMERICAN: 57 ML/MIN/1.73M2
SODIUM SERPL-SCNC: 141 MMOL/L (ref 135–146)
TRIGL SERPL-MCNC: 94 MG/DL
WBC # BLD AUTO: 8.1 THOUSAND/UL (ref 3.8–10.8)

## 2019-11-25 ENCOUNTER — OFFICE VISIT (OUTPATIENT)
Dept: INTERNAL MEDICINE CLINIC | Facility: CLINIC | Age: 84
End: 2019-11-25
Payer: COMMERCIAL

## 2019-11-25 VITALS
BODY MASS INDEX: 22.54 KG/M2 | HEART RATE: 84 BPM | OXYGEN SATURATION: 94 % | SYSTOLIC BLOOD PRESSURE: 110 MMHG | DIASTOLIC BLOOD PRESSURE: 60 MMHG | TEMPERATURE: 98.4 F | WEIGHT: 127.2 LBS | HEIGHT: 63 IN

## 2019-11-25 DIAGNOSIS — K21.9 GASTROESOPHAGEAL REFLUX DISEASE WITHOUT ESOPHAGITIS: Primary | ICD-10-CM

## 2019-11-25 DIAGNOSIS — E78.2 MIXED HYPERLIPIDEMIA: ICD-10-CM

## 2019-11-25 DIAGNOSIS — E55.9 VITAMIN D DEFICIENCY: ICD-10-CM

## 2019-11-25 DIAGNOSIS — N18.30 CKD (CHRONIC KIDNEY DISEASE) STAGE 3, GFR 30-59 ML/MIN (HCC): ICD-10-CM

## 2019-11-25 DIAGNOSIS — I10 BENIGN ESSENTIAL HYPERTENSION: ICD-10-CM

## 2019-11-25 PROCEDURE — 99214 OFFICE O/P EST MOD 30 MIN: CPT | Performed by: INTERNAL MEDICINE

## 2019-11-25 NOTE — PROGRESS NOTES
Assessment/Plan:    1  Hyperlipidemia  Lipid profile is in excellent range  But she is complaining of a upper extremity heaviness myalgias and also same symptom over lower extremity  Most likely secondary to simvastatin  Will discontinue simvastatin for now and will observe  Will decide on next visit regarding statin therapy  2  Gastroesophageal reflux disease  Continue with the Protonix 40 mg daily  Also advised for better diet control  3  Essential hypertension  Blood pressure is well controlled on present regimen  4  CKD stage 3  Renal function is stable  Will continue to monitor  There are no diagnoses linked to this encounter  Subjective:          Patient ID: Poncho Gilliland is a 81 y o  female  Patient came to the office for regular follow-up  Does have blood work done last week and would like to discuss results  Still complaining of both arm muscle pain  And also pain in the thigh muscles  The following portions of the patient's history were reviewed and updated as appropriate: allergies, current medications, past family history, past medical history, past social history, past surgical history and problem list     Review of Systems   Constitutional: Negative for fatigue and fever  HENT: Negative for congestion, ear discharge, ear pain, postnasal drip, sinus pressure, sore throat, tinnitus and trouble swallowing  Eyes: Negative for discharge, itching and visual disturbance  Respiratory: Negative for cough and shortness of breath  Cardiovascular: Negative for chest pain and palpitations  Gastrointestinal: Negative for abdominal pain, diarrhea, nausea and vomiting  Endocrine: Negative for cold intolerance and polyuria  Genitourinary: Negative for difficulty urinating, dysuria and urgency  Musculoskeletal: Positive for arthralgias and myalgias  Negative for neck pain  Skin: Negative for rash     Allergic/Immunologic: Negative for environmental allergies  Neurological: Negative for dizziness, weakness and headaches  Psychiatric/Behavioral: The patient is not nervous/anxious            Past Medical History:   Diagnosis Date    Anxiety     Last Assessed:11/24/2014    Appendicitis     Asymptomatic varicose veins     Last Assessed:10/8/2014    Esophageal reflux     Last Assessed:11/3/2014    Fracture of rib     Last Assessed:8/7/2015    Insomnia     Last Assessed:3/18/2014    Syncope     pt reports recent syncope    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)     Last Assessed:3/18/2014         Current Outpatient Medications:     acetaminophen (TYLENOL) 500 mg tablet, Take 1 tablet (500 mg total) by mouth every 4 (four) hours as needed (knee and back pain), Disp: 90 tablet, Rfl: 1    amLODIPine (NORVASC) 10 mg tablet, Take 1 tablet (10 mg total) by mouth daily, Disp: 90 tablet, Rfl: 1    Cholecalciferol (VITAMIN D3) 400 units CAPS, Take 2 capsules by mouth daily, Disp: , Rfl:     gatifloxacin (ZYMAXID) 0 5 %, Apply 1 drop to eye 4 (four) times a day, Disp: , Rfl:     LORazepam (ATIVAN) 0 5 mg tablet, Take 1 tablet (0 5 mg total) by mouth as needed (help sleep), Disp: 30 tablet, Rfl: 0    pantoprazole (PROTONIX) 40 mg tablet, Take 40 mg by mouth daily, Disp: , Rfl:     prednisoLONE acetate (PRED FORTE) 1 % ophthalmic suspension, Apply 1 drop to eye 2 (two) times a day, Disp: , Rfl:     Allergies   Allergen Reactions    Daypro [Oxaprozin]      Unknown allergic reaction    Minocycline Other (See Comments)     Loss of taste       Social History   Past Surgical History:   Procedure Laterality Date    APPENDECTOMY      CATARACT EXTRACTION, BILATERAL      HYSTERECTOMY      INCISIONAL BREAST BIOPSY       Family History   Problem Relation Age of Onset    Diabetes Mother     Varicose Veins Mother     Diabetes Brother        Objective:  /60 (BP Location: Left arm, Patient Position: Sitting, Cuff Size: Standard) Pulse 84   Temp 98 4 °F (36 9 °C) (Oral)   Ht 5' 2 5" (1 588 m)   Wt 57 7 kg (127 lb 3 2 oz)   SpO2 94%   BMI 22 89 kg/m²   Body mass index is 22 89 kg/m²  Physical Exam   Constitutional: She appears well-developed  HENT:   Head: Normocephalic  Right Ear: External ear normal    Left Ear: External ear normal    Mouth/Throat: Oropharynx is clear and moist    Eyes: Pupils are equal, round, and reactive to light  No scleral icterus  Neck: Normal range of motion  Neck supple  No tracheal deviation present  No thyromegaly present  Cardiovascular: Normal rate, regular rhythm and normal heart sounds  No murmur heard  Pulmonary/Chest: Effort normal and breath sounds normal  No respiratory distress  She exhibits no tenderness  Abdominal: Soft  Bowel sounds are normal  She exhibits no mass  There is no tenderness  Musculoskeletal: Normal range of motion  She exhibits no edema  Lymphadenopathy:     She has no cervical adenopathy  Neurological: She is alert  No cranial nerve deficit  Skin: Skin is warm  Psychiatric: She has a normal mood and affect

## 2019-11-26 ENCOUNTER — OFFICE VISIT (OUTPATIENT)
Dept: INTERNAL MEDICINE CLINIC | Facility: CLINIC | Age: 84
End: 2019-11-26
Payer: COMMERCIAL

## 2019-11-26 VITALS
WEIGHT: 127.4 LBS | BODY MASS INDEX: 22.57 KG/M2 | OXYGEN SATURATION: 98 % | HEART RATE: 74 BPM | HEIGHT: 63 IN | TEMPERATURE: 98 F

## 2019-11-26 DIAGNOSIS — N30.00 ACUTE CYSTITIS WITHOUT HEMATURIA: ICD-10-CM

## 2019-11-26 DIAGNOSIS — I10 BENIGN ESSENTIAL HYPERTENSION: ICD-10-CM

## 2019-11-26 DIAGNOSIS — K21.9 GASTROESOPHAGEAL REFLUX DISEASE WITHOUT ESOPHAGITIS: ICD-10-CM

## 2019-11-26 DIAGNOSIS — R30.0 BURNING WITH URINATION: Primary | ICD-10-CM

## 2019-11-26 LAB
SL AMB  POCT GLUCOSE, UA: NORMAL
SL AMB LEUKOCYTE ESTERASE,UA: NORMAL
SL AMB POCT BILIRUBIN,UA: NORMAL
SL AMB POCT BLOOD,UA: NORMAL
SL AMB POCT CLARITY,UA: CLEAR
SL AMB POCT COLOR,UA: NORMAL
SL AMB POCT KETONES,UA: 15
SL AMB POCT NITRITE,UA: NORMAL
SL AMB POCT PH,UA: 5.5
SL AMB POCT SPECIFIC GRAVITY,UA: 1.02
SL AMB POCT URINE PROTEIN: NORMAL
SL AMB POCT UROBILINOGEN: 0.2

## 2019-11-26 PROCEDURE — 81002 URINALYSIS NONAUTO W/O SCOPE: CPT | Performed by: INTERNAL MEDICINE

## 2019-11-26 PROCEDURE — 99213 OFFICE O/P EST LOW 20 MIN: CPT | Performed by: INTERNAL MEDICINE

## 2019-11-26 PROCEDURE — 1036F TOBACCO NON-USER: CPT | Performed by: INTERNAL MEDICINE

## 2019-11-26 PROCEDURE — 1160F RVW MEDS BY RX/DR IN RCRD: CPT | Performed by: INTERNAL MEDICINE

## 2019-11-26 RX ORDER — CEPHALEXIN 500 MG/1
500 CAPSULE ORAL EVERY 8 HOURS SCHEDULED
Qty: 21 CAPSULE | Refills: 0 | Status: SHIPPED | OUTPATIENT
Start: 2019-11-26 | End: 2019-12-03

## 2019-11-26 NOTE — PROGRESS NOTES
Assessment/Plan:    1  Acute cystitis  Will start patient on Keflex 500 mg t i d  For 1 week  Also advised to increase fluid intake  Tylenol p r n  For discomfort  2  Essential hypertension  Blood pressure is well controlled on present regimen     Diagnoses and all orders for this visit:    Burning with urination  -     POCT urine dip    Acute cystitis without hematuria  -     cephalexin (KEFLEX) 500 mg capsule; Take 1 capsule (500 mg total) by mouth every 8 (eight) hours for 7 days    Benign essential hypertension    Gastroesophageal reflux disease without esophagitis               Subjective:          Patient ID: Odalys Jenkins is a 81 y o  female  Patient came to the office with a complain of dysuria and frequency started this morning  She does complain of lower abdominal pressure/discomfort  No fever chills no nausea vomiting  The following portions of the patient's history were reviewed and updated as appropriate: allergies, current medications, past family history, past medical history, past social history, past surgical history and problem list     Review of Systems   Constitutional: Negative for fatigue and fever  HENT: Negative for congestion, ear discharge, ear pain, postnasal drip, sinus pressure, sore throat, tinnitus and trouble swallowing  Eyes: Negative for discharge, itching and visual disturbance  Respiratory: Negative for cough and shortness of breath  Cardiovascular: Negative for chest pain and palpitations  Gastrointestinal: Negative for abdominal pain, diarrhea, nausea and vomiting  Endocrine: Negative for cold intolerance and polyuria  Genitourinary: Positive for difficulty urinating, dysuria and frequency  Negative for urgency  Musculoskeletal: Negative for arthralgias and neck pain  Skin: Negative for rash  Allergic/Immunologic: Negative for environmental allergies  Neurological: Negative for dizziness, weakness and headaches     Psychiatric/Behavioral: The patient is not nervous/anxious            Past Medical History:   Diagnosis Date    Anxiety     Last Assessed:11/24/2014    Appendicitis     Asymptomatic varicose veins     Last Assessed:10/8/2014    Esophageal reflux     Last Assessed:11/3/2014    Fracture of rib     Last Assessed:8/7/2015    Insomnia     Last Assessed:3/18/2014    Syncope     pt reports recent syncope    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)     Last Assessed:3/18/2014         Current Outpatient Medications:     acetaminophen (TYLENOL) 500 mg tablet, Take 1 tablet (500 mg total) by mouth every 4 (four) hours as needed (knee and back pain), Disp: 90 tablet, Rfl: 1    amLODIPine (NORVASC) 10 mg tablet, Take 1 tablet (10 mg total) by mouth daily, Disp: 90 tablet, Rfl: 1    cephalexin (KEFLEX) 500 mg capsule, Take 1 capsule (500 mg total) by mouth every 8 (eight) hours for 7 days, Disp: 21 capsule, Rfl: 0    Cholecalciferol (VITAMIN D3) 400 units CAPS, Take 2 capsules by mouth daily, Disp: , Rfl:     gatifloxacin (ZYMAXID) 0 5 %, Apply 1 drop to eye 4 (four) times a day, Disp: , Rfl:     LORazepam (ATIVAN) 0 5 mg tablet, Take 1 tablet (0 5 mg total) by mouth as needed (help sleep), Disp: 30 tablet, Rfl: 0    pantoprazole (PROTONIX) 40 mg tablet, Take 40 mg by mouth daily, Disp: , Rfl:     prednisoLONE acetate (PRED FORTE) 1 % ophthalmic suspension, Apply 1 drop to eye 2 (two) times a day, Disp: , Rfl:     Allergies   Allergen Reactions    Daypro [Oxaprozin]      Unknown allergic reaction    Minocycline Other (See Comments)     Loss of taste       Social History   Past Surgical History:   Procedure Laterality Date    APPENDECTOMY      CATARACT EXTRACTION, BILATERAL      HYSTERECTOMY      INCISIONAL BREAST BIOPSY       Family History   Problem Relation Age of Onset    Diabetes Mother     Varicose Veins Mother     Diabetes Brother        Objective:  Pulse 74   Temp 98 °F (36 7 °C) (Oral)   Ht 5' 2 5" (1 588 m)   Wt 57 8 kg (127 lb 6 4 oz)   SpO2 98%   BMI 22 93 kg/m²   Body mass index is 22 93 kg/m²  Physical Exam   Constitutional: She appears well-developed  HENT:   Head: Normocephalic  Right Ear: External ear normal    Left Ear: External ear normal    Mouth/Throat: Oropharynx is clear and moist    Eyes: Pupils are equal, round, and reactive to light  No scleral icterus  Neck: Normal range of motion  Neck supple  No tracheal deviation present  No thyromegaly present  Cardiovascular: Normal rate, regular rhythm and normal heart sounds  Pulmonary/Chest: Effort normal and breath sounds normal  No respiratory distress  She exhibits no tenderness  Abdominal: Soft  Bowel sounds are normal  She exhibits no mass  There is no tenderness  Mild suprapubic tenderness noted  No flank tenderness   Musculoskeletal: Normal range of motion  Lymphadenopathy:     She has no cervical adenopathy  Neurological: She is alert  No cranial nerve deficit  Skin: Skin is warm  Psychiatric: She has a normal mood and affect

## 2019-12-02 ENCOUNTER — TELEPHONE (OUTPATIENT)
Dept: INTERNAL MEDICINE CLINIC | Facility: CLINIC | Age: 84
End: 2019-12-02

## 2019-12-02 NOTE — TELEPHONE ENCOUNTER
Pt is on keflex for UTI  This is not a typical side effect of keflex    Please have her schedule appt

## 2019-12-02 NOTE — TELEPHONE ENCOUNTER
Patient called and stated that she is experiencing heel pain that started abruptly  She explains that she is currently on an antibiotic for a yeast infection  She wanted to know if this could be a side effect  She is leaving for [place] soon and would like to get her heel evaluated before hand if this is not something common from the antibiotic  Patient can be reached at [phone]

## 2019-12-03 ENCOUNTER — OFFICE VISIT (OUTPATIENT)
Dept: INTERNAL MEDICINE CLINIC | Facility: CLINIC | Age: 84
End: 2019-12-03
Payer: COMMERCIAL

## 2019-12-03 VITALS
DIASTOLIC BLOOD PRESSURE: 80 MMHG | HEART RATE: 62 BPM | TEMPERATURE: 97.6 F | SYSTOLIC BLOOD PRESSURE: 130 MMHG | HEIGHT: 63 IN | WEIGHT: 125.8 LBS | BODY MASS INDEX: 22.29 KG/M2 | OXYGEN SATURATION: 99 %

## 2019-12-03 DIAGNOSIS — S91.319A: Primary | ICD-10-CM

## 2019-12-03 DIAGNOSIS — R23.4 CRACKED SKIN ON FEET: ICD-10-CM

## 2019-12-03 PROCEDURE — 1160F RVW MEDS BY RX/DR IN RCRD: CPT | Performed by: NURSE PRACTITIONER

## 2019-12-03 PROCEDURE — 1036F TOBACCO NON-USER: CPT | Performed by: NURSE PRACTITIONER

## 2019-12-03 PROCEDURE — 99213 OFFICE O/P EST LOW 20 MIN: CPT | Performed by: NURSE PRACTITIONER

## 2019-12-03 NOTE — ASSESSMENT & PLAN NOTE
Apply vaseline to feet at night and wear cotton socks to improve skin integrity and prevent further skin breakdown

## 2019-12-03 NOTE — PROGRESS NOTES
Assessment/Plan:       Problem List Items Addressed This Visit        Musculoskeletal and Integument    Tear of skin of heel, initial encounter - Primary     Apply neosporin and bandaid/gauze under stockings or vaseline and gauze under cotton socks to help lesion heal  Not infected at this time  Monitor for s/s infection  Cracked skin on feet     Apply vaseline to feet at night and wear cotton socks to improve skin integrity and prevent further skin breakdown  Subjective:      Patient ID: Patrick Novoa is a 81 y o  female  Patient presnets for an acute visit  She reports that 4 days ago, she got up from bed and stepped on the floor and had excurtiating pain in her right heel  It lasted for about 5 minutes, and then it became a manageable pain  She was initially barefoot, as she was climbing out of bed, and then put on her flip flops, as this is common for her to wear around the house  She reports that sometimes when she presses on a specific part of her heel, it is tender on the skin  She denies any bleeding or known wound to the area, but admits she has not looked very closely  She reports that she has no pain now, but as she is travelling to [place] in the next few weeks, she wants to ensure that there is not something serious going on  The following portions of the patient's history were reviewed and updated as appropriate: allergies, current medications, past family history, past medical history, past social history, past surgical history and problem list     Review of Systems   Constitutional: Negative for chills and fever  HENT: Negative for congestion  Respiratory: Negative for shortness of breath  Cardiovascular: Negative for chest pain  Gastrointestinal: Negative for abdominal pain  Genitourinary: Negative for dysuria  Musculoskeletal: Negative for gait problem  Skin: Negative for rash and wound  Neurological: Negative for dizziness  Psychiatric/Behavioral: Negative for confusion           Past Medical History:   Diagnosis Date    Anxiety     Last Assessed:11/24/2014    Appendicitis     Asymptomatic varicose veins     Last Assessed:10/8/2014    Esophageal reflux     Last Assessed:11/3/2014    Fracture of rib     Last Assessed:8/7/2015    Insomnia     Last Assessed:3/18/2014    Syncope     pt reports recent syncope    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)     Last Assessed:3/18/2014         Current Outpatient Medications:     acetaminophen (TYLENOL) 500 mg tablet, Take 1 tablet (500 mg total) by mouth every 4 (four) hours as needed (knee and back pain), Disp: 90 tablet, Rfl: 1    amLODIPine (NORVASC) 10 mg tablet, Take 1 tablet (10 mg total) by mouth daily, Disp: 90 tablet, Rfl: 1    cephalexin (KEFLEX) 500 mg capsule, Take 1 capsule (500 mg total) by mouth every 8 (eight) hours for 7 days, Disp: 21 capsule, Rfl: 0    Cholecalciferol (VITAMIN D3) 400 units CAPS, Take 2 capsules by mouth daily, Disp: , Rfl:     gatifloxacin (ZYMAXID) 0 5 %, Apply 1 drop to eye 4 (four) times a day, Disp: , Rfl:     LORazepam (ATIVAN) 0 5 mg tablet, Take 1 tablet (0 5 mg total) by mouth as needed (help sleep), Disp: 30 tablet, Rfl: 0    pantoprazole (PROTONIX) 40 mg tablet, Take 40 mg by mouth daily, Disp: , Rfl:     prednisoLONE acetate (PRED FORTE) 1 % ophthalmic suspension, Apply 1 drop to eye 2 (two) times a day, Disp: , Rfl:     Allergies   Allergen Reactions    Daypro [Oxaprozin]      Unknown allergic reaction    Minocycline Other (See Comments)     Loss of taste       Social History   Past Surgical History:   Procedure Laterality Date    APPENDECTOMY      CATARACT EXTRACTION, BILATERAL      HYSTERECTOMY      INCISIONAL BREAST BIOPSY       Family History   Problem Relation Age of Onset    Diabetes Mother     Varicose Veins Mother     Diabetes Brother        Objective:  /80 (BP Location: Left arm, Patient Position: Sitting, Cuff Size: Adult)   Pulse 62   Temp 97 6 °F (36 4 °C) (Oral)   Ht 5' 2 5" (1 588 m)   Wt 57 1 kg (125 lb 12 8 oz)   SpO2 99%   BMI 22 64 kg/m²        Physical Exam   Constitutional: She is oriented to person, place, and time  She appears well-developed  HENT:   Head: Normocephalic  Eyes: Right eye exhibits no discharge  No scleral icterus  Neck: Normal range of motion  Cardiovascular: Normal rate  Pulses:       Dorsalis pedis pulses are 2+ on the right side  Posterior tibial pulses are 2+ on the right side  Pulmonary/Chest: Effort normal and breath sounds normal    Abdominal: Soft  Musculoskeletal: She exhibits no edema  Feet:   Right Foot:   Skin Integrity: Positive for skin breakdown and dry skin  Negative for ulcer, blister, erythema, warmth or callus  Neurological: She is alert and oriented to person, place, and time  Skin: Skin is warm and dry  Laceration (cracked, dry skin noted to right heel of foot, correlating with pain  No erythema) noted  Psychiatric: She has a normal mood and affect   Her behavior is normal

## 2019-12-03 NOTE — ASSESSMENT & PLAN NOTE
Apply neosporin and bandaid/gauze under stockings or vaseline and gauze under cotton socks to help lesion heal  Not infected at this time  Monitor for s/s infection

## 2019-12-09 ENCOUNTER — TELEPHONE (OUTPATIENT)
Dept: INTERNAL MEDICINE CLINIC | Facility: CLINIC | Age: 84
End: 2019-12-09

## 2019-12-09 NOTE — TELEPHONE ENCOUNTER
Patient had called asking if she can take bausch and lomb- preservision areds 2 --for macular degeneration vitamin recommended  They want her to contact her pcp to make sure its okay to take  can she take it? Is this a prescription ? If so can it be sent in

## 2019-12-10 NOTE — TELEPHONE ENCOUNTER
This is okay, and it is available OTC or her ophthalmologist can prescribe it if they want to recommend it to her

## 2020-01-06 ENCOUNTER — OFFICE VISIT (OUTPATIENT)
Dept: INTERNAL MEDICINE CLINIC | Facility: CLINIC | Age: 85
End: 2020-01-06
Payer: COMMERCIAL

## 2020-01-06 VITALS
BODY MASS INDEX: 22.36 KG/M2 | HEIGHT: 63 IN | TEMPERATURE: 98.1 F | SYSTOLIC BLOOD PRESSURE: 122 MMHG | DIASTOLIC BLOOD PRESSURE: 60 MMHG | OXYGEN SATURATION: 99 % | HEART RATE: 61 BPM | WEIGHT: 126.2 LBS

## 2020-01-06 DIAGNOSIS — E78.2 MIXED HYPERLIPIDEMIA: ICD-10-CM

## 2020-01-06 DIAGNOSIS — E55.9 VITAMIN D DEFICIENCY: ICD-10-CM

## 2020-01-06 DIAGNOSIS — K21.9 GASTROESOPHAGEAL REFLUX DISEASE WITHOUT ESOPHAGITIS: ICD-10-CM

## 2020-01-06 DIAGNOSIS — I10 BENIGN ESSENTIAL HYPERTENSION: ICD-10-CM

## 2020-01-06 DIAGNOSIS — I83.93 ASYMPTOMATIC VARICOSE VEINS OF BOTH LOWER EXTREMITIES: ICD-10-CM

## 2020-01-06 DIAGNOSIS — N18.30 CKD (CHRONIC KIDNEY DISEASE) STAGE 3, GFR 30-59 ML/MIN (HCC): ICD-10-CM

## 2020-01-06 DIAGNOSIS — H35.30 MACULAR DEGENERATION, UNSPECIFIED LATERALITY, UNSPECIFIED TYPE: Primary | ICD-10-CM

## 2020-01-06 PROBLEM — Z01.818 PREOP EXAMINATION: Status: RESOLVED | Noted: 2019-09-26 | Resolved: 2020-01-06

## 2020-01-06 PROCEDURE — 99214 OFFICE O/P EST MOD 30 MIN: CPT | Performed by: INTERNAL MEDICINE

## 2020-01-06 PROCEDURE — 3074F SYST BP LT 130 MM HG: CPT | Performed by: INTERNAL MEDICINE

## 2020-01-06 PROCEDURE — 3078F DIAST BP <80 MM HG: CPT | Performed by: INTERNAL MEDICINE

## 2020-01-06 RX ORDER — ANTIOX #8/OM3/DHA/EPA/LUT/ZEAX 250-2.5 MG
1 CAPSULE ORAL DAILY
Qty: 90 CAPSULE | Refills: 1 | Status: SHIPPED | OUTPATIENT
Start: 2020-01-06

## 2020-01-06 NOTE — PROGRESS NOTES
Assessment/Plan:    1  Hyperlipidemia  Patient claims that her muscle pain is better after stopping statins  Will continue to hold it for now  Will check lipid profile before next visit    2  Gastroesophageal reflux disease  Stable on Protonix 40 mg daily    3  Essential hypertension  Blood pressure is well controlled on present regimen    4  CKD stage 2  Renal function is stable  Will continue to monitor     Diagnoses and all orders for this visit:    Macular degeneration, unspecified laterality, unspecified type  -     Multiple Vitamins-Minerals (PRESERVISION AREDS 2) CAPS; Take 1 capsule by mouth daily    Gastroesophageal reflux disease without esophagitis    Benign essential hypertension  -     Basic metabolic panel; Future    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    CKD (chronic kidney disease) stage 3, GFR 30-59 ml/min ([facility])  -     Basic metabolic panel; Future    Vitamin D deficiency    Mixed hyperlipidemia  -     Lipid Panel with Direct LDL reflex; Future    Other orders  -     Discontinue: Multiple Vitamins-Minerals (PRESERVISION AREDS 2 PO); Take 1 each by mouth daily               Subjective:          Patient ID: Ann Marie Flowers is a 81 y o  female  Patient is here for regular follow-up  Denied any new complaints  No blood test prior to this visit  The following portions of the patient's history were reviewed and updated as appropriate: allergies, current medications, past family history, past medical history, past social history, past surgical history and problem list     Review of Systems   Constitutional: Negative for fatigue and fever  HENT: Negative for congestion, ear discharge, ear pain, postnasal drip, sinus pressure, sore throat, tinnitus and trouble swallowing  Eyes: Negative for discharge, itching and visual disturbance  Respiratory: Negative for cough and shortness of breath  Cardiovascular: Negative for chest pain and palpitations     Gastrointestinal: Negative for abdominal pain, diarrhea, nausea and vomiting  Endocrine: Negative for cold intolerance and polyuria  Genitourinary: Negative for difficulty urinating, dysuria and urgency  Musculoskeletal: Negative for arthralgias and neck pain  Skin: Negative for rash  Allergic/Immunologic: Negative for environmental allergies  Neurological: Negative for dizziness, weakness and headaches  Psychiatric/Behavioral: Negative for agitation and behavioral problems  The patient is not nervous/anxious            Past Medical History:   Diagnosis Date    Anxiety     Last Assessed:11/24/2014    Appendicitis     Asymptomatic varicose veins     Last Assessed:10/8/2014    Esophageal reflux     Last Assessed:11/3/2014    Fracture of rib     Last Assessed:8/7/2015    Insomnia     Last Assessed:3/18/2014    Macular degeneration     Syncope     pt reports recent syncope    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)     Last Assessed:3/18/2014         Current Outpatient Medications:     acetaminophen (TYLENOL) 500 mg tablet, Take 1 tablet (500 mg total) by mouth every 4 (four) hours as needed (knee and back pain), Disp: 90 tablet, Rfl: 1    amLODIPine (NORVASC) 10 mg tablet, Take 1 tablet (10 mg total) by mouth daily, Disp: 90 tablet, Rfl: 1    Cholecalciferol (VITAMIN D3) 400 units CAPS, Take 2 capsules by mouth daily, Disp: , Rfl:     LORazepam (ATIVAN) 0 5 mg tablet, Take 1 tablet (0 5 mg total) by mouth as needed (help sleep), Disp: 30 tablet, Rfl: 0    pantoprazole (PROTONIX) 40 mg tablet, Take 40 mg by mouth daily, Disp: , Rfl:     Multiple Vitamins-Minerals (PRESERVISION AREDS 2) CAPS, Take 1 capsule by mouth daily, Disp: 90 capsule, Rfl: 1    Allergies   Allergen Reactions    Daypro [Oxaprozin]      Unknown allergic reaction    Minocycline Other (See Comments)     Loss of taste       Social History   Past Surgical History:   Procedure Laterality Date    APPENDECTOMY  CATARACT EXTRACTION, BILATERAL      HYSTERECTOMY      INCISIONAL BREAST BIOPSY       Family History   Problem Relation Age of Onset    Diabetes Mother     Varicose Veins Mother     Diabetes Brother        Objective:  /60 (BP Location: Left arm, Patient Position: Sitting, Cuff Size: Standard)   Pulse 61   Temp 98 1 °F (36 7 °C) (Oral)   Ht 5' 2 5" (1 588 m)   Wt 57 2 kg (126 lb 3 2 oz) Comment: with shoes  SpO2 99% Comment: ra  BMI 22 71 kg/m²   Body mass index is 22 71 kg/m²  Physical Exam   Constitutional: She appears well-developed  HENT:   Head: Normocephalic  Mouth/Throat: Oropharynx is clear and moist    Eyes: Pupils are equal, round, and reactive to light  No scleral icterus  Neck: Normal range of motion  Neck supple  No tracheal deviation present  No thyromegaly present  Cardiovascular: Normal rate, regular rhythm and normal heart sounds  Pulmonary/Chest: Effort normal and breath sounds normal  No respiratory distress  She exhibits no tenderness  Abdominal: Soft  Bowel sounds are normal  She exhibits no mass  There is no tenderness  Musculoskeletal: Normal range of motion  Lymphadenopathy:     She has no cervical adenopathy  Neurological: She is alert  No cranial nerve deficit  Skin: Skin is warm  Psychiatric: She has a normal mood and affect

## 2020-01-17 ENCOUNTER — OFFICE VISIT (OUTPATIENT)
Dept: INTERNAL MEDICINE CLINIC | Facility: CLINIC | Age: 85
End: 2020-01-17
Payer: COMMERCIAL

## 2020-01-17 VITALS
HEIGHT: 63 IN | OXYGEN SATURATION: 98 % | BODY MASS INDEX: 22.61 KG/M2 | HEART RATE: 74 BPM | TEMPERATURE: 97.9 F | WEIGHT: 127.6 LBS | DIASTOLIC BLOOD PRESSURE: 80 MMHG | SYSTOLIC BLOOD PRESSURE: 116 MMHG

## 2020-01-17 DIAGNOSIS — N30.01 ACUTE CYSTITIS WITH HEMATURIA: ICD-10-CM

## 2020-01-17 DIAGNOSIS — R31.9 HEMATURIA, UNSPECIFIED TYPE: Primary | ICD-10-CM

## 2020-01-17 LAB
SL AMB  POCT GLUCOSE, UA: ABNORMAL
SL AMB LEUKOCYTE ESTERASE,UA: ABNORMAL
SL AMB POCT BILIRUBIN,UA: ABNORMAL
SL AMB POCT BLOOD,UA: ABNORMAL
SL AMB POCT CLARITY,UA: ABNORMAL
SL AMB POCT COLOR,UA: ABNORMAL
SL AMB POCT KETONES,UA: ABNORMAL
SL AMB POCT NITRITE,UA: ABNORMAL
SL AMB POCT PH,UA: 5.5
SL AMB POCT SPECIFIC GRAVITY,UA: >=1.03
SL AMB POCT URINE PROTEIN: ABNORMAL
SL AMB POCT UROBILINOGEN: ABNORMAL

## 2020-01-17 PROCEDURE — 99213 OFFICE O/P EST LOW 20 MIN: CPT | Performed by: NURSE PRACTITIONER

## 2020-01-17 PROCEDURE — 87086 URINE CULTURE/COLONY COUNT: CPT | Performed by: NURSE PRACTITIONER

## 2020-01-17 PROCEDURE — 81003 URINALYSIS AUTO W/O SCOPE: CPT | Performed by: NURSE PRACTITIONER

## 2020-01-17 RX ORDER — CIPROFLOXACIN 500 MG/1
500 TABLET, FILM COATED ORAL EVERY 12 HOURS SCHEDULED
Qty: 14 TABLET | Refills: 0 | Status: SHIPPED | OUTPATIENT
Start: 2020-01-17 | End: 2020-01-24

## 2020-01-17 NOTE — PROGRESS NOTES
Assessment/Plan:    PT with UTI in Nov and tx with keflex  No culture completed at that time  UA in office:  large blood, trace leuk, trace ketone  No hx of renal calculi  Will start cipro  Send UA for culture  Encouraged hydration  D/w pt if has another UTI should follow-up with urology    Problem List Items Addressed This Visit     None      Visit Diagnoses     Hematuria, unspecified type    -  Primary    Relevant Orders    POCT urine dip auto non-scope        M*Modal software was used to dictate this note  It may contain errors with dictating incorrect words or incorrect spelling  Please contact the provider directly with any questions  Subjective:      Patient ID: Theresa Reed is a 81 y o  female  Urinary Tract Infection    This is a new problem  The current episode started today  The quality of the pain is described as burning  The pain is moderate  There has been no fever  She is not sexually active  There is no history of pyelonephritis  Associated symptoms include hesitancy and urgency  Pertinent negatives include no chills, discharge, flank pain, frequency, hematuria, nausea or vomiting  Associated symptoms comments: + dysuria  She has tried nothing for the symptoms  There is no history of kidney stones or recurrent UTIs  The following portions of the patient's history were reviewed and updated as appropriate: allergies, current medications, past family history, past medical history, past social history, past surgical history and problem list     Review of Systems   Constitutional: Negative for chills, fatigue and fever  Respiratory: Negative for shortness of breath  Cardiovascular: Negative for chest pain  Gastrointestinal: Negative for abdominal pain, blood in stool, constipation, diarrhea, nausea and vomiting  Genitourinary: Positive for difficulty urinating, dysuria, hesitancy and urgency   Negative for flank pain, frequency, hematuria, vaginal bleeding, vaginal discharge and vaginal pain  Musculoskeletal: Positive for arthralgias (L hip pain, she reports from dancing)  Neurological: Negative for headaches           Past Medical History:   Diagnosis Date    Anxiety     Last Assessed:11/24/2014    Appendicitis     Asymptomatic varicose veins     Last Assessed:10/8/2014    Esophageal reflux     Last Assessed:11/3/2014    Fracture of rib     Last Assessed:8/7/2015    Insomnia     Last Assessed:3/18/2014    Macular degeneration     Syncope     pt reports recent syncope    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)     Last Assessed:3/18/2014         Current Outpatient Medications:     acetaminophen (TYLENOL) 500 mg tablet, Take 1 tablet (500 mg total) by mouth every 4 (four) hours as needed (knee and back pain), Disp: 90 tablet, Rfl: 1    amLODIPine (NORVASC) 10 mg tablet, Take 1 tablet (10 mg total) by mouth daily, Disp: 90 tablet, Rfl: 1    Cholecalciferol (VITAMIN D3) 400 units CAPS, Take 2 capsules by mouth daily, Disp: , Rfl:     LORazepam (ATIVAN) 0 5 mg tablet, Take 1 tablet (0 5 mg total) by mouth as needed (help sleep), Disp: 30 tablet, Rfl: 0    Multiple Vitamins-Minerals (PRESERVISION AREDS 2) CAPS, Take 1 capsule by mouth daily, Disp: 90 capsule, Rfl: 1    pantoprazole (PROTONIX) 40 mg tablet, Take 40 mg by mouth daily, Disp: , Rfl:     Allergies   Allergen Reactions    Daypro [Oxaprozin]      Unknown allergic reaction    Minocycline Other (See Comments)     Loss of taste       Social History   Past Surgical History:   Procedure Laterality Date    APPENDECTOMY      CATARACT EXTRACTION, BILATERAL      HYSTERECTOMY      INCISIONAL BREAST BIOPSY       Family History   Problem Relation Age of Onset    Diabetes Mother     Varicose Veins Mother     Diabetes Brother        Objective:  /80 (BP Location: Left arm, Patient Position: Sitting, Cuff Size: Adult)   Pulse 74   Temp 97 9 °F (36 6 °C) (Oral)   Ht 5' 2 5" (1 588 m) Wt 57 9 kg (127 lb 9 6 oz)   SpO2 98%   BMI 22 97 kg/m²      Physical Exam   Constitutional: She is oriented to person, place, and time  She appears well-developed and well-nourished  Non-toxic appearance  She does not have a sickly appearance  She does not appear ill  No distress  Cardiovascular: Normal rate and regular rhythm  Pulmonary/Chest: Effort normal and breath sounds normal  No respiratory distress  She has no wheezes  Abdominal: Soft  Bowel sounds are normal  She exhibits no distension  There is no tenderness  There is no CVA tenderness  Neurological: She is alert and oriented to person, place, and time  Skin: Skin is warm and dry  Psychiatric: She has a normal mood and affect  Her behavior is normal  Judgment and thought content normal    Nursing note and vitals reviewed

## 2020-01-17 NOTE — PATIENT INSTRUCTIONS
Urinary Tract Infection:  Take full course of antibiotics  Would recommend OTC probiotic or yogurt to help protect the natural pat of your stomach  Make sure you drink plenty of water  There are certain practices to help reduce the frequency of UTIs  Make sure you wipe front to back to avoid contamination        If get another urine infection would recommend following up with a urologist

## 2020-01-19 LAB — BACTERIA UR CULT: ABNORMAL

## 2020-01-31 DIAGNOSIS — G47.00 INSOMNIA, UNSPECIFIED TYPE: ICD-10-CM

## 2020-01-31 RX ORDER — LORAZEPAM 0.5 MG/1
TABLET ORAL
Qty: 30 TABLET | Refills: 0 | OUTPATIENT
Start: 2020-01-31

## 2020-01-31 RX ORDER — PANTOPRAZOLE SODIUM 40 MG/1
TABLET, DELAYED RELEASE ORAL
Qty: 90 TABLET | Refills: 0 | OUTPATIENT
Start: 2020-01-31

## 2020-02-01 DIAGNOSIS — G47.00 INSOMNIA, UNSPECIFIED TYPE: ICD-10-CM

## 2020-02-02 RX ORDER — PANTOPRAZOLE SODIUM 40 MG/1
TABLET, DELAYED RELEASE ORAL
Qty: 90 TABLET | Refills: 0 | OUTPATIENT
Start: 2020-02-02

## 2020-02-02 RX ORDER — LORAZEPAM 0.5 MG/1
TABLET ORAL
Qty: 30 TABLET | Refills: 0 | OUTPATIENT
Start: 2020-02-02

## 2020-02-03 DIAGNOSIS — K21.9 GASTROESOPHAGEAL REFLUX DISEASE WITHOUT ESOPHAGITIS: Primary | ICD-10-CM

## 2020-02-03 DIAGNOSIS — G47.00 INSOMNIA, UNSPECIFIED TYPE: ICD-10-CM

## 2020-02-03 RX ORDER — PANTOPRAZOLE SODIUM 40 MG/1
40 TABLET, DELAYED RELEASE ORAL DAILY
Qty: 90 TABLET | Refills: 1 | Status: SHIPPED | OUTPATIENT
Start: 2020-02-03 | End: 2020-05-11 | Stop reason: HOSPADM

## 2020-02-03 RX ORDER — LORAZEPAM 0.5 MG/1
0.5 TABLET ORAL AS NEEDED
Qty: 30 TABLET | Refills: 0 | Status: SHIPPED | OUTPATIENT
Start: 2020-02-03 | End: 2020-05-11 | Stop reason: SDUPTHER

## 2020-02-03 NOTE — TELEPHONE ENCOUNTER
Needs refill on protonix 40 mg, 3 month supply and lorazepam 0 5 mg sent to [facility] in [place]

## 2020-03-19 ENCOUNTER — OFFICE VISIT (OUTPATIENT)
Dept: INTERNAL MEDICINE CLINIC | Facility: CLINIC | Age: 85
End: 2020-03-19
Payer: COMMERCIAL

## 2020-03-19 VITALS
BODY MASS INDEX: 22.82 KG/M2 | HEIGHT: 63 IN | DIASTOLIC BLOOD PRESSURE: 66 MMHG | WEIGHT: 128.8 LBS | TEMPERATURE: 98.3 F | HEART RATE: 60 BPM | OXYGEN SATURATION: 99 % | SYSTOLIC BLOOD PRESSURE: 110 MMHG

## 2020-03-19 DIAGNOSIS — N30.01 ACUTE CYSTITIS WITH HEMATURIA: Primary | ICD-10-CM

## 2020-03-19 DIAGNOSIS — N30.01 ACUTE CYSTITIS WITH HEMATURIA: ICD-10-CM

## 2020-03-19 DIAGNOSIS — R39.9 UTI SYMPTOMS: Primary | ICD-10-CM

## 2020-03-19 LAB
SL AMB  POCT GLUCOSE, UA: NORMAL
SL AMB LEUKOCYTE ESTERASE,UA: NORMAL
SL AMB POCT BILIRUBIN,UA: NORMAL
SL AMB POCT BLOOD,UA: NORMAL
SL AMB POCT CLARITY,UA: NORMAL
SL AMB POCT COLOR,UA: YELLOW
SL AMB POCT KETONES,UA: NORMAL
SL AMB POCT NITRITE,UA: NORMAL
SL AMB POCT PH,UA: 6
SL AMB POCT SPECIFIC GRAVITY,UA: 1.02
SL AMB POCT URINE PROTEIN: 100
SL AMB POCT UROBILINOGEN: 0.2

## 2020-03-19 PROCEDURE — 87086 URINE CULTURE/COLONY COUNT: CPT | Performed by: FAMILY MEDICINE

## 2020-03-19 PROCEDURE — 81003 URINALYSIS AUTO W/O SCOPE: CPT | Performed by: FAMILY MEDICINE

## 2020-03-19 PROCEDURE — 1160F RVW MEDS BY RX/DR IN RCRD: CPT | Performed by: FAMILY MEDICINE

## 2020-03-19 PROCEDURE — 3074F SYST BP LT 130 MM HG: CPT | Performed by: FAMILY MEDICINE

## 2020-03-19 PROCEDURE — 87186 SC STD MICRODIL/AGAR DIL: CPT | Performed by: FAMILY MEDICINE

## 2020-03-19 PROCEDURE — 99213 OFFICE O/P EST LOW 20 MIN: CPT | Performed by: FAMILY MEDICINE

## 2020-03-19 PROCEDURE — 87077 CULTURE AEROBIC IDENTIFY: CPT | Performed by: FAMILY MEDICINE

## 2020-03-19 PROCEDURE — 3008F BODY MASS INDEX DOCD: CPT | Performed by: FAMILY MEDICINE

## 2020-03-19 PROCEDURE — 4040F PNEUMOC VAC/ADMIN/RCVD: CPT | Performed by: FAMILY MEDICINE

## 2020-03-19 PROCEDURE — 3078F DIAST BP <80 MM HG: CPT | Performed by: FAMILY MEDICINE

## 2020-03-19 PROCEDURE — 1036F TOBACCO NON-USER: CPT | Performed by: FAMILY MEDICINE

## 2020-03-19 RX ORDER — CIPROFLOXACIN 250 MG/1
500 TABLET, FILM COATED ORAL EVERY 12 HOURS SCHEDULED
Qty: 10 TABLET | Refills: 0 | Status: SHIPPED | OUTPATIENT
Start: 2020-03-19 | End: 2020-03-19

## 2020-03-19 RX ORDER — CIPROFLOXACIN 250 MG/1
250 TABLET, FILM COATED ORAL EVERY 12 HOURS SCHEDULED
Qty: 10 TABLET | Refills: 0 | Status: SHIPPED | OUTPATIENT
Start: 2020-03-19 | End: 2020-03-24

## 2020-03-19 NOTE — PROGRESS NOTES
Assessment/Plan:    1  UTI symptoms  -     POCT urine dip  -     ciprofloxacin (CIPRO) 250 mg tablet; Take 2 tablets (500 mg total) by mouth every 12 (twelve) hours for 5 days    2  Acute cystitis with hematuria            There are no Patient Instructions on file for this visit  No follow-ups on file  Subjective:      Patient ID: Dheeraj Morris is a 81 y o  female  Chief Complaint   Patient presents with    Possible UTI     pt  presents for possible UTI  Symptoms: urinary urgency,frequency  Started yesterday       HPI    Urine frequency and urgency  No burning  Started 2 days ago   No fenvers    The following portions of the patient's history were reviewed and updated as appropriate: allergies, current medications, past family history, past medical history, past social history, past surgical history and problem list     Review of Systems      Constitutional:  Denies fever or chills   Eyes:  Denies double or blurry vision  HENT:  Denies nasal congestion or sore throat   Respiratory:  Denies cough or shortness of breath or wheezing  Cardiovascular:  Denies palpitations or chest pain  GI:  Denies abdominal pain, nausea, or vomiting  Integument:  Denies rash , no open areas  Neurologic:  Denies headache or focal weakness        Current Outpatient Medications   Medication Sig Dispense Refill    acetaminophen (TYLENOL) 500 mg tablet Take 1 tablet (500 mg total) by mouth every 4 (four) hours as needed (knee and back pain) 90 tablet 1    amLODIPine (NORVASC) 10 mg tablet Take 1 tablet (10 mg total) by mouth daily 90 tablet 1    Cholecalciferol (VITAMIN D3 PO) Take 1 capsule by mouth daily       LORazepam (ATIVAN) 0 5 mg tablet Take 1 tablet (0 5 mg total) by mouth as needed (help sleep) 30 tablet 0    Multiple Vitamins-Minerals (PRESERVISION AREDS 2) CAPS Take 1 capsule by mouth daily (Patient taking differently: Take 2 capsules by mouth daily ) 90 capsule 1    pantoprazole (PROTONIX) 40 mg tablet Take 1 tablet (40 mg total) by mouth daily 90 tablet 1    ciprofloxacin (CIPRO) 250 mg tablet Take 2 tablets (500 mg total) by mouth every 12 (twelve) hours for 5 days 10 tablet 0     No current facility-administered medications for this visit          Objective:    /66 (BP Location: Left arm, Patient Position: Sitting, Cuff Size: Standard)   Pulse 60   Temp 98 3 °F (36 8 °C) (Tympanic)   Ht 5' 2 5" (1 588 m)   Wt 58 4 kg (128 lb 12 8 oz)   SpO2 99%   BMI 23 18 kg/m²        Physical Exam    Constitutional:  Well developed, well nourished, no acute distress, non-toxic appearance   Eyes:  PERRL, conjunctiva normal , non icteric sclera  HENT:  Atraumatic, oropharynx moist  Neck-  supple   Respiratory:  CTA b/l, normal breath sounds, no rales, no wheezing   Cardiovascular:  RRR, no murmurs, no LE edema b/l  GI:  Soft, nondistended, normal bowel sounds x 4, nontender, no organomegaly, no mass, no rebound, no guarding   Neurologic:  no focal deficits noted   Psychiatric:  Speech and behavior appropriate , AAO x 3           Agustin Jameson DO

## 2020-03-22 LAB
BACTERIA UR CULT: ABNORMAL
BACTERIA UR CULT: ABNORMAL

## 2020-03-23 DIAGNOSIS — I10 ESSENTIAL HYPERTENSION: ICD-10-CM

## 2020-03-23 RX ORDER — AMLODIPINE BESYLATE 10 MG/1
10 TABLET ORAL DAILY
Qty: 90 TABLET | Refills: 1 | Status: SHIPPED | OUTPATIENT
Start: 2020-03-23 | End: 2020-05-11 | Stop reason: SDUPTHER

## 2020-03-31 ENCOUNTER — TELEPHONE (OUTPATIENT)
Dept: INTERNAL MEDICINE CLINIC | Facility: CLINIC | Age: 85
End: 2020-03-31

## 2020-03-31 NOTE — TELEPHONE ENCOUNTER
Pt called, education given regarding the use and efficacy of chloroquine based on current information regarding FDA's recent emergency approval of drug for treatment of covid-19  Pt denies any symptoms for her or her  currently  Pt advised to stay home and avoid contact with other people as much as possible at this time and that chloroquine is not likely to help patient or her  at this time  Pt advised to contact office if any symptoms develop

## 2020-03-31 NOTE — TELEPHONE ENCOUNTER
Pt called because she would like a prescription for the for the chloroquine for her and her  because she says that she heard that the FDA has approved it and she also does not want to came in because she was seen 3/19/20

## 2020-05-06 ENCOUNTER — APPOINTMENT (OUTPATIENT)
Dept: LAB | Facility: HOSPITAL | Age: 85
End: 2020-05-06
Attending: INTERNAL MEDICINE
Payer: COMMERCIAL

## 2020-05-06 DIAGNOSIS — I10 BENIGN ESSENTIAL HYPERTENSION: ICD-10-CM

## 2020-05-06 DIAGNOSIS — N18.30 CKD (CHRONIC KIDNEY DISEASE) STAGE 3, GFR 30-59 ML/MIN (HCC): ICD-10-CM

## 2020-05-06 DIAGNOSIS — E78.2 MIXED HYPERLIPIDEMIA: ICD-10-CM

## 2020-05-06 LAB
ANION GAP SERPL CALCULATED.3IONS-SCNC: 4 MMOL/L (ref 4–13)
BUN SERPL-MCNC: 23 MG/DL (ref 5–25)
CALCIUM SERPL-MCNC: 9.9 MG/DL (ref 8.3–10.1)
CHLORIDE SERPL-SCNC: 105 MMOL/L (ref 100–108)
CHOLEST SERPL-MCNC: 271 MG/DL (ref 50–200)
CO2 SERPL-SCNC: 30 MMOL/L (ref 21–32)
CREAT SERPL-MCNC: 0.88 MG/DL (ref 0.6–1.3)
GFR SERPL CREATININE-BSD FRML MDRD: 61 ML/MIN/1.73SQ M
GLUCOSE P FAST SERPL-MCNC: 110 MG/DL (ref 65–99)
HDLC SERPL-MCNC: 59 MG/DL
LDLC SERPL CALC-MCNC: 191 MG/DL (ref 0–100)
POTASSIUM SERPL-SCNC: 4.6 MMOL/L (ref 3.5–5.3)
SODIUM SERPL-SCNC: 139 MMOL/L (ref 136–145)
TRIGL SERPL-MCNC: 103 MG/DL

## 2020-05-06 PROCEDURE — 80061 LIPID PANEL: CPT

## 2020-05-06 PROCEDURE — 36415 COLL VENOUS BLD VENIPUNCTURE: CPT

## 2020-05-06 PROCEDURE — 80048 BASIC METABOLIC PNL TOTAL CA: CPT

## 2020-05-11 ENCOUNTER — OFFICE VISIT (OUTPATIENT)
Dept: INTERNAL MEDICINE CLINIC | Facility: CLINIC | Age: 85
End: 2020-05-11
Payer: COMMERCIAL

## 2020-05-11 VITALS
WEIGHT: 125.4 LBS | HEIGHT: 63 IN | DIASTOLIC BLOOD PRESSURE: 62 MMHG | OXYGEN SATURATION: 97 % | HEART RATE: 76 BPM | BODY MASS INDEX: 22.22 KG/M2 | RESPIRATION RATE: 20 BRPM | SYSTOLIC BLOOD PRESSURE: 120 MMHG | TEMPERATURE: 98 F

## 2020-05-11 DIAGNOSIS — K21.9 GASTROESOPHAGEAL REFLUX DISEASE WITHOUT ESOPHAGITIS: ICD-10-CM

## 2020-05-11 DIAGNOSIS — E55.9 VITAMIN D DEFICIENCY: ICD-10-CM

## 2020-05-11 DIAGNOSIS — E78.2 MIXED HYPERLIPIDEMIA: ICD-10-CM

## 2020-05-11 DIAGNOSIS — R73.9 HYPERGLYCEMIA: ICD-10-CM

## 2020-05-11 DIAGNOSIS — I10 ESSENTIAL HYPERTENSION: ICD-10-CM

## 2020-05-11 DIAGNOSIS — Z00.00 MEDICARE ANNUAL WELLNESS VISIT, SUBSEQUENT: Primary | ICD-10-CM

## 2020-05-11 DIAGNOSIS — G47.00 INSOMNIA, UNSPECIFIED TYPE: ICD-10-CM

## 2020-05-11 DIAGNOSIS — N18.30 CKD (CHRONIC KIDNEY DISEASE) STAGE 3, GFR 30-59 ML/MIN (HCC): ICD-10-CM

## 2020-05-11 PROCEDURE — 3078F DIAST BP <80 MM HG: CPT | Performed by: INTERNAL MEDICINE

## 2020-05-11 PROCEDURE — 1125F AMNT PAIN NOTED PAIN PRSNT: CPT | Performed by: INTERNAL MEDICINE

## 2020-05-11 PROCEDURE — G0439 PPPS, SUBSEQ VISIT: HCPCS | Performed by: INTERNAL MEDICINE

## 2020-05-11 PROCEDURE — 99214 OFFICE O/P EST MOD 30 MIN: CPT | Performed by: INTERNAL MEDICINE

## 2020-05-11 PROCEDURE — 4040F PNEUMOC VAC/ADMIN/RCVD: CPT | Performed by: INTERNAL MEDICINE

## 2020-05-11 PROCEDURE — 3074F SYST BP LT 130 MM HG: CPT | Performed by: INTERNAL MEDICINE

## 2020-05-11 PROCEDURE — 1036F TOBACCO NON-USER: CPT | Performed by: INTERNAL MEDICINE

## 2020-05-11 PROCEDURE — 1170F FXNL STATUS ASSESSED: CPT | Performed by: INTERNAL MEDICINE

## 2020-05-11 PROCEDURE — 3008F BODY MASS INDEX DOCD: CPT | Performed by: INTERNAL MEDICINE

## 2020-05-11 PROCEDURE — 1160F RVW MEDS BY RX/DR IN RCRD: CPT | Performed by: INTERNAL MEDICINE

## 2020-05-11 RX ORDER — LORAZEPAM 0.5 MG/1
0.5 TABLET ORAL AS NEEDED
Qty: 30 TABLET | Refills: 0 | Status: SHIPPED | OUTPATIENT
Start: 2020-05-11 | End: 2020-10-14 | Stop reason: SDUPTHER

## 2020-05-11 RX ORDER — PANTOPRAZOLE SODIUM 40 MG/1
40 TABLET, DELAYED RELEASE ORAL DAILY
Qty: 90 TABLET | Refills: 1 | Status: CANCELLED | OUTPATIENT
Start: 2020-05-11

## 2020-05-11 RX ORDER — AMLODIPINE BESYLATE 10 MG/1
10 TABLET ORAL DAILY
Qty: 90 TABLET | Refills: 1 | Status: SHIPPED | OUTPATIENT
Start: 2020-05-11 | End: 2020-09-29 | Stop reason: SDUPTHER

## 2020-05-11 RX ORDER — ATORVASTATIN CALCIUM 20 MG/1
20 TABLET, FILM COATED ORAL DAILY
Qty: 90 TABLET | Refills: 3 | Status: SHIPPED | OUTPATIENT
Start: 2020-05-11 | End: 2021-03-19 | Stop reason: SDUPTHER

## 2020-05-11 RX ORDER — FAMOTIDINE 10 MG
10 TABLET ORAL 2 TIMES DAILY
Qty: 180 TABLET | Refills: 1 | Status: SHIPPED | OUTPATIENT
Start: 2020-05-11 | End: 2020-07-06 | Stop reason: ALTCHOICE

## 2020-05-11 RX ORDER — FAMOTIDINE 10 MG
10 TABLET ORAL 2 TIMES DAILY
COMMUNITY
End: 2020-05-11 | Stop reason: SDUPTHER

## 2020-06-25 ENCOUNTER — TELEPHONE (OUTPATIENT)
Dept: INTERNAL MEDICINE CLINIC | Facility: CLINIC | Age: 85
End: 2020-06-25

## 2020-06-26 DIAGNOSIS — K21.9 GASTROESOPHAGEAL REFLUX DISEASE WITHOUT ESOPHAGITIS: Primary | ICD-10-CM

## 2020-06-26 RX ORDER — OMEPRAZOLE 20 MG/1
20 CAPSULE, DELAYED RELEASE ORAL DAILY
Qty: 30 CAPSULE | Refills: 1 | Status: SHIPPED | OUTPATIENT
Start: 2020-06-26 | End: 2020-09-01 | Stop reason: SDUPTHER

## 2020-06-29 ENCOUNTER — TELEPHONE (OUTPATIENT)
Dept: INTERNAL MEDICINE CLINIC | Facility: CLINIC | Age: 85
End: 2020-06-29

## 2020-07-04 ENCOUNTER — TELEPHONE (OUTPATIENT)
Dept: OTHER | Facility: OTHER | Age: 85
End: 2020-07-04

## 2020-07-06 ENCOUNTER — OFFICE VISIT (OUTPATIENT)
Dept: INTERNAL MEDICINE CLINIC | Age: 85
End: 2020-07-06
Payer: COMMERCIAL

## 2020-07-06 VITALS
BODY MASS INDEX: 22.79 KG/M2 | OXYGEN SATURATION: 99 % | DIASTOLIC BLOOD PRESSURE: 72 MMHG | HEART RATE: 68 BPM | TEMPERATURE: 98.6 F | SYSTOLIC BLOOD PRESSURE: 140 MMHG | WEIGHT: 128.6 LBS | HEIGHT: 63 IN

## 2020-07-06 DIAGNOSIS — E78.2 MIXED HYPERLIPIDEMIA: ICD-10-CM

## 2020-07-06 DIAGNOSIS — N30.00 ACUTE CYSTITIS WITHOUT HEMATURIA: ICD-10-CM

## 2020-07-06 DIAGNOSIS — R39.9 UTI SYMPTOMS: Primary | ICD-10-CM

## 2020-07-06 DIAGNOSIS — K21.9 GASTROESOPHAGEAL REFLUX DISEASE WITHOUT ESOPHAGITIS: ICD-10-CM

## 2020-07-06 DIAGNOSIS — I10 BENIGN ESSENTIAL HYPERTENSION: ICD-10-CM

## 2020-07-06 LAB
SL AMB  POCT GLUCOSE, UA: NORMAL
SL AMB LEUKOCYTE ESTERASE,UA: NORMAL
SL AMB POCT BILIRUBIN,UA: NORMAL
SL AMB POCT BLOOD,UA: NORMAL
SL AMB POCT CLARITY,UA: CLEAR
SL AMB POCT COLOR,UA: YELLOW
SL AMB POCT KETONES,UA: NORMAL
SL AMB POCT NITRITE,UA: NORMAL
SL AMB POCT PH,UA: 7
SL AMB POCT SPECIFIC GRAVITY,UA: 1.01
SL AMB POCT URINE PROTEIN: NORMAL
SL AMB POCT UROBILINOGEN: 0.2

## 2020-07-06 PROCEDURE — 3078F DIAST BP <80 MM HG: CPT | Performed by: INTERNAL MEDICINE

## 2020-07-06 PROCEDURE — 3008F BODY MASS INDEX DOCD: CPT | Performed by: INTERNAL MEDICINE

## 2020-07-06 PROCEDURE — 4040F PNEUMOC VAC/ADMIN/RCVD: CPT | Performed by: INTERNAL MEDICINE

## 2020-07-06 PROCEDURE — 99214 OFFICE O/P EST MOD 30 MIN: CPT | Performed by: INTERNAL MEDICINE

## 2020-07-06 PROCEDURE — 1160F RVW MEDS BY RX/DR IN RCRD: CPT | Performed by: INTERNAL MEDICINE

## 2020-07-06 PROCEDURE — 3077F SYST BP >= 140 MM HG: CPT | Performed by: INTERNAL MEDICINE

## 2020-07-06 PROCEDURE — 1036F TOBACCO NON-USER: CPT | Performed by: INTERNAL MEDICINE

## 2020-07-06 PROCEDURE — 81003 URINALYSIS AUTO W/O SCOPE: CPT | Performed by: INTERNAL MEDICINE

## 2020-07-06 RX ORDER — CEPHALEXIN 250 MG/1
250 CAPSULE ORAL EVERY 8 HOURS SCHEDULED
Qty: 21 CAPSULE | Refills: 0 | Status: SHIPPED | OUTPATIENT
Start: 2020-07-06 | End: 2020-07-13

## 2020-07-06 NOTE — TELEPHONE ENCOUNTER
Scheduled appt with you in [facility] office  She said it went away, but I suggested she come in and get checked  She said that would be a good idea because she doesn't know what is going on  It is happening more frequently and they come and go

## 2020-08-06 ENCOUNTER — APPOINTMENT (OUTPATIENT)
Dept: LAB | Facility: IMAGING CENTER | Age: 85
End: 2020-08-06
Payer: COMMERCIAL

## 2020-08-06 DIAGNOSIS — R73.9 HYPERGLYCEMIA: ICD-10-CM

## 2020-08-06 DIAGNOSIS — K21.9 GASTROESOPHAGEAL REFLUX DISEASE WITHOUT ESOPHAGITIS: ICD-10-CM

## 2020-08-06 DIAGNOSIS — I10 ESSENTIAL HYPERTENSION: ICD-10-CM

## 2020-08-06 DIAGNOSIS — E78.2 MIXED HYPERLIPIDEMIA: ICD-10-CM

## 2020-08-06 LAB
ALT SERPL W P-5'-P-CCNC: 23 U/L (ref 12–78)
AST SERPL W P-5'-P-CCNC: 21 U/L (ref 5–45)
CHOLEST SERPL-MCNC: 160 MG/DL (ref 50–200)
ERYTHROCYTE [DISTWIDTH] IN BLOOD BY AUTOMATED COUNT: 13 % (ref 11.6–15.1)
EST. AVERAGE GLUCOSE BLD GHB EST-MCNC: 120 MG/DL
HBA1C MFR BLD: 5.8 %
HCT VFR BLD AUTO: 40.3 % (ref 34.8–46.1)
HDLC SERPL-MCNC: 60 MG/DL
HGB BLD-MCNC: 12.4 G/DL (ref 11.5–15.4)
LDLC SERPL CALC-MCNC: 83 MG/DL (ref 0–100)
MCH RBC QN AUTO: 27.7 PG (ref 26.8–34.3)
MCHC RBC AUTO-ENTMCNC: 30.8 G/DL (ref 31.4–37.4)
MCV RBC AUTO: 90 FL (ref 82–98)
PLATELET # BLD AUTO: 253 THOUSANDS/UL (ref 149–390)
PMV BLD AUTO: 10.9 FL (ref 8.9–12.7)
RBC # BLD AUTO: 4.47 MILLION/UL (ref 3.81–5.12)
TRIGL SERPL-MCNC: 87 MG/DL
WBC # BLD AUTO: 8.45 THOUSAND/UL (ref 4.31–10.16)

## 2020-08-06 PROCEDURE — 80061 LIPID PANEL: CPT

## 2020-08-06 PROCEDURE — 83036 HEMOGLOBIN GLYCOSYLATED A1C: CPT

## 2020-08-06 PROCEDURE — 85027 COMPLETE CBC AUTOMATED: CPT

## 2020-08-06 PROCEDURE — 36415 COLL VENOUS BLD VENIPUNCTURE: CPT

## 2020-08-06 PROCEDURE — 84450 TRANSFERASE (AST) (SGOT): CPT

## 2020-08-06 PROCEDURE — 84460 ALANINE AMINO (ALT) (SGPT): CPT

## 2020-08-11 ENCOUNTER — OFFICE VISIT (OUTPATIENT)
Dept: INTERNAL MEDICINE CLINIC | Facility: CLINIC | Age: 85
End: 2020-08-11
Payer: COMMERCIAL

## 2020-08-11 VITALS
HEIGHT: 63 IN | TEMPERATURE: 98 F | WEIGHT: 126.6 LBS | DIASTOLIC BLOOD PRESSURE: 60 MMHG | HEART RATE: 65 BPM | BODY MASS INDEX: 22.43 KG/M2 | SYSTOLIC BLOOD PRESSURE: 122 MMHG | OXYGEN SATURATION: 98 %

## 2020-08-11 DIAGNOSIS — I10 BENIGN ESSENTIAL HYPERTENSION: ICD-10-CM

## 2020-08-11 DIAGNOSIS — K21.9 GASTROESOPHAGEAL REFLUX DISEASE WITHOUT ESOPHAGITIS: Primary | ICD-10-CM

## 2020-08-11 DIAGNOSIS — G47.00 INSOMNIA, UNSPECIFIED TYPE: ICD-10-CM

## 2020-08-11 DIAGNOSIS — E55.9 VITAMIN D DEFICIENCY: ICD-10-CM

## 2020-08-11 DIAGNOSIS — R73.9 HYPERGLYCEMIA: ICD-10-CM

## 2020-08-11 PROBLEM — N30.01 ACUTE CYSTITIS WITH HEMATURIA: Status: RESOLVED | Noted: 2020-03-19 | Resolved: 2020-08-11

## 2020-08-11 PROCEDURE — 3078F DIAST BP <80 MM HG: CPT | Performed by: INTERNAL MEDICINE

## 2020-08-11 PROCEDURE — 1160F RVW MEDS BY RX/DR IN RCRD: CPT | Performed by: INTERNAL MEDICINE

## 2020-08-11 PROCEDURE — 3008F BODY MASS INDEX DOCD: CPT | Performed by: INTERNAL MEDICINE

## 2020-08-11 PROCEDURE — 99214 OFFICE O/P EST MOD 30 MIN: CPT | Performed by: INTERNAL MEDICINE

## 2020-08-11 PROCEDURE — 1036F TOBACCO NON-USER: CPT | Performed by: INTERNAL MEDICINE

## 2020-08-11 PROCEDURE — 3074F SYST BP LT 130 MM HG: CPT | Performed by: INTERNAL MEDICINE

## 2020-08-11 PROCEDURE — 4040F PNEUMOC VAC/ADMIN/RCVD: CPT | Performed by: INTERNAL MEDICINE

## 2020-08-11 NOTE — PROGRESS NOTES
Assessment/Plan:    1  GERD  Doing well on omeprazole 20 mg daily  2  Hyperlipidemia  Lipid profile is in excellent range  Will continue with atorvastatin 20 mg daily    3  Essential hypertension  Blood pressure is well controlled on amlodipine 10 mg daily    4  Insomnia  Doing well on p r n  Use of lorazepam     Diagnoses and all orders for this visit:    Gastroesophageal reflux disease without esophagitis    Benign essential hypertension    Vitamin D deficiency    Hyperglycemia    Insomnia, unspecified type    Other orders  -     Cranberry-Vitamin C-Probiotic (AZO CRANBERRY PO); Take by mouth daily               Subjective:          Patient ID: Bharti Daniel is a 81 y o  female  Patient is here for regular follow-up  Does have blood work done last week would like to discuss results  Otherwise no new complaints  The following portions of the patient's history were reviewed and updated as appropriate: allergies, current medications, past family history, past medical history, past social history, past surgical history and problem list     Review of Systems   Constitutional: Negative for fatigue and fever  HENT: Negative for congestion, ear discharge, ear pain, postnasal drip, sinus pressure, sore throat, tinnitus and trouble swallowing  Eyes: Negative for discharge, itching and visual disturbance  Respiratory: Negative for cough and shortness of breath  Cardiovascular: Negative for chest pain and palpitations  Gastrointestinal: Negative for abdominal pain, diarrhea, nausea and vomiting  Endocrine: Negative for cold intolerance and polyuria  Genitourinary: Negative for difficulty urinating, dysuria and urgency  Musculoskeletal: Negative for arthralgias and neck pain  Skin: Negative for rash  Allergic/Immunologic: Negative for environmental allergies  Neurological: Negative for dizziness, weakness and headaches     Psychiatric/Behavioral: Negative for agitation and behavioral problems  The patient is not nervous/anxious            Past Medical History:   Diagnosis Date    Anxiety     Last Assessed:11/24/2014    Appendicitis     Asymptomatic varicose veins     Last Assessed:10/8/2014    Esophageal reflux     Last Assessed:11/3/2014    Fracture of rib     Last Assessed:8/7/2015    Insomnia     Last Assessed:3/18/2014    Macular degeneration     Syncope     pt reports recent syncope    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)     Last Assessed:3/18/2014         Current Outpatient Medications:     acetaminophen (TYLENOL) 500 mg tablet, Take 1 tablet (500 mg total) by mouth every 4 (four) hours as needed (knee and back pain), Disp: 90 tablet, Rfl: 1    amLODIPine (NORVASC) 10 mg tablet, Take 1 tablet (10 mg total) by mouth daily, Disp: 90 tablet, Rfl: 1    atorvastatin (LIPITOR) 20 mg tablet, Take 1 tablet (20 mg total) by mouth daily, Disp: 90 tablet, Rfl: 3    Cholecalciferol (VITAMIN D3 PO), Take 1 capsule by mouth daily 2000, Disp: , Rfl:     Cranberry-Vitamin C-Probiotic (AZO CRANBERRY PO), Take by mouth daily, Disp: , Rfl:     LORazepam (ATIVAN) 0 5 mg tablet, Take 1 tablet (0 5 mg total) by mouth as needed (help sleep), Disp: 30 tablet, Rfl: 0    Multiple Vitamins-Minerals (PRESERVISION AREDS 2) CAPS, Take 1 capsule by mouth daily (Patient taking differently: Take 2 capsules by mouth daily ), Disp: 90 capsule, Rfl: 1    omeprazole (PriLOSEC) 20 mg delayed release capsule, Take 1 capsule (20 mg total) by mouth daily, Disp: 30 capsule, Rfl: 1    Allergies   Allergen Reactions    Daypro [Oxaprozin]      Unknown allergic reaction    Minocycline Other (See Comments)     Loss of taste       Social History   Past Surgical History:   Procedure Laterality Date    APPENDECTOMY      CATARACT EXTRACTION, BILATERAL      HYSTERECTOMY      INCISIONAL BREAST BIOPSY       Family History   Problem Relation Age of Onset    Diabetes Mother    Steve Carvalho Varicose Veins Mother     Diabetes Brother        Objective:  /60 (BP Location: Left arm, Patient Position: Sitting, Cuff Size: Adult)   Pulse 65   Temp 98 °F (36 7 °C) (Temporal)   Ht 5' 3" (1 6 m)   Wt 57 4 kg (126 lb 9 6 oz) Comment: w shoes  SpO2 98% Comment: katja  BMI 22 43 kg/m²   Body mass index is 22 43 kg/m²  Physical Exam  Constitutional:       Appearance: She is well-developed  HENT:      Head: Normocephalic  Right Ear: Ear canal normal       Left Ear: Ear canal normal       Mouth/Throat:      Mouth: Mucous membranes are moist    Eyes:      General: No scleral icterus  Pupils: Pupils are equal, round, and reactive to light  Neck:      Musculoskeletal: Normal range of motion and neck supple  Thyroid: No thyromegaly  Trachea: No tracheal deviation  Cardiovascular:      Rate and Rhythm: Normal rate and regular rhythm  Heart sounds: Normal heart sounds  Comments: Bilateral varicose veins present  Pulmonary:      Effort: Pulmonary effort is normal  No respiratory distress  Breath sounds: Normal breath sounds  Chest:      Chest wall: No tenderness  Abdominal:      General: Bowel sounds are normal       Palpations: Abdomen is soft  There is no mass  Tenderness: There is no abdominal tenderness  Musculoskeletal: Normal range of motion  Right lower leg: No edema  Left lower leg: No edema  Lymphadenopathy:      Cervical: No cervical adenopathy  Skin:     General: Skin is warm  Neurological:      Mental Status: She is alert  Cranial Nerves: No cranial nerve deficit  Psychiatric:         Mood and Affect: Mood normal          Behavior: Behavior normal          Thought Content:  Thought content normal

## 2020-08-25 DIAGNOSIS — K21.9 GASTROESOPHAGEAL REFLUX DISEASE WITHOUT ESOPHAGITIS: ICD-10-CM

## 2020-08-25 RX ORDER — OMEPRAZOLE 20 MG/1
CAPSULE, DELAYED RELEASE ORAL
Qty: 30 CAPSULE | Refills: 0 | OUTPATIENT
Start: 2020-08-25

## 2020-09-01 DIAGNOSIS — K21.9 GASTROESOPHAGEAL REFLUX DISEASE WITHOUT ESOPHAGITIS: ICD-10-CM

## 2020-09-01 RX ORDER — OMEPRAZOLE 20 MG/1
20 CAPSULE, DELAYED RELEASE ORAL DAILY
Qty: 30 CAPSULE | Refills: 1 | Status: SHIPPED | OUTPATIENT
Start: 2020-09-01 | End: 2020-09-29 | Stop reason: SDUPTHER

## 2020-09-28 DIAGNOSIS — I10 ESSENTIAL HYPERTENSION: ICD-10-CM

## 2020-09-28 DIAGNOSIS — K21.9 GASTROESOPHAGEAL REFLUX DISEASE WITHOUT ESOPHAGITIS: ICD-10-CM

## 2020-09-28 NOTE — TELEPHONE ENCOUNTER
Needs refills :   Amlodipine 10 mg 90 day   Omeprazole 20 mg 90 day   [facility]  Nov 11/2/20  lov 8/11/20

## 2020-09-29 RX ORDER — OMEPRAZOLE 20 MG/1
20 CAPSULE, DELAYED RELEASE ORAL DAILY
Qty: 90 CAPSULE | Refills: 0 | Status: SHIPPED | OUTPATIENT
Start: 2020-09-29 | End: 2020-12-29 | Stop reason: SDUPTHER

## 2020-09-29 RX ORDER — AMLODIPINE BESYLATE 10 MG/1
10 TABLET ORAL DAILY
Qty: 90 TABLET | Refills: 0 | Status: SHIPPED | OUTPATIENT
Start: 2020-09-29 | End: 2020-10-27 | Stop reason: SDUPTHER

## 2020-10-06 ENCOUNTER — OFFICE VISIT (OUTPATIENT)
Dept: INTERNAL MEDICINE CLINIC | Facility: CLINIC | Age: 85
End: 2020-10-06
Payer: COMMERCIAL

## 2020-10-06 VITALS
HEIGHT: 63 IN | SYSTOLIC BLOOD PRESSURE: 140 MMHG | TEMPERATURE: 98.5 F | HEART RATE: 85 BPM | DIASTOLIC BLOOD PRESSURE: 70 MMHG | OXYGEN SATURATION: 97 % | WEIGHT: 128 LBS | BODY MASS INDEX: 22.68 KG/M2

## 2020-10-06 DIAGNOSIS — R55 POSTURAL DIZZINESS WITH PRESYNCOPE: ICD-10-CM

## 2020-10-06 DIAGNOSIS — R42 LIGHTHEADEDNESS: Primary | ICD-10-CM

## 2020-10-06 DIAGNOSIS — R42 POSTURAL DIZZINESS WITH PRESYNCOPE: ICD-10-CM

## 2020-10-06 DIAGNOSIS — K52.9 GASTROENTERITIS: ICD-10-CM

## 2020-10-06 PROBLEM — Z85.828 HISTORY OF BASAL CELL CARCINOMA: Status: ACTIVE | Noted: 2019-10-25

## 2020-10-06 PROCEDURE — 99213 OFFICE O/P EST LOW 20 MIN: CPT | Performed by: INTERNAL MEDICINE

## 2020-10-14 DIAGNOSIS — G47.00 INSOMNIA, UNSPECIFIED TYPE: ICD-10-CM

## 2020-10-14 RX ORDER — LORAZEPAM 0.5 MG/1
0.5 TABLET ORAL AS NEEDED
Qty: 30 TABLET | Refills: 0 | Status: SHIPPED | OUTPATIENT
Start: 2020-10-14 | End: 2020-12-29 | Stop reason: SDUPTHER

## 2020-10-22 ENCOUNTER — OFFICE VISIT (OUTPATIENT)
Dept: INTERNAL MEDICINE CLINIC | Age: 85
End: 2020-10-22
Payer: COMMERCIAL

## 2020-10-22 VITALS
SYSTOLIC BLOOD PRESSURE: 122 MMHG | TEMPERATURE: 97.1 F | OXYGEN SATURATION: 99 % | DIASTOLIC BLOOD PRESSURE: 58 MMHG | HEART RATE: 75 BPM | WEIGHT: 127 LBS | HEIGHT: 64 IN | BODY MASS INDEX: 21.68 KG/M2

## 2020-10-22 DIAGNOSIS — R39.15 URINARY URGENCY: Primary | ICD-10-CM

## 2020-10-22 DIAGNOSIS — R19.7 INTERMITTENT DIARRHEA: ICD-10-CM

## 2020-10-22 DIAGNOSIS — N39.0 URINARY TRACT INFECTION WITH HEMATURIA, SITE UNSPECIFIED: ICD-10-CM

## 2020-10-22 DIAGNOSIS — R31.9 URINARY TRACT INFECTION WITH HEMATURIA, SITE UNSPECIFIED: ICD-10-CM

## 2020-10-22 DIAGNOSIS — M79.671 RIGHT FOOT PAIN: ICD-10-CM

## 2020-10-22 LAB
BACTERIA UR QL AUTO: ABNORMAL /HPF
BILIRUB UR QL STRIP: NEGATIVE
CLARITY UR: ABNORMAL
COLOR UR: YELLOW
GLUCOSE UR STRIP-MCNC: NEGATIVE MG/DL
HGB UR QL STRIP.AUTO: ABNORMAL
HYALINE CASTS #/AREA URNS LPF: ABNORMAL /LPF
KETONES UR STRIP-MCNC: NEGATIVE MG/DL
LEUKOCYTE ESTERASE UR QL STRIP: ABNORMAL
NITRITE UR QL STRIP: NEGATIVE
NON-SQ EPI CELLS URNS QL MICRO: ABNORMAL /HPF
PH UR STRIP.AUTO: 6 [PH]
PROT UR STRIP-MCNC: NEGATIVE MG/DL
RBC #/AREA URNS AUTO: ABNORMAL /HPF
SL AMB  POCT GLUCOSE, UA: ABNORMAL
SL AMB LEUKOCYTE ESTERASE,UA: ABNORMAL
SL AMB POCT BILIRUBIN,UA: ABNORMAL
SL AMB POCT BLOOD,UA: ABNORMAL
SL AMB POCT CLARITY,UA: ABNORMAL
SL AMB POCT COLOR,UA: ABNORMAL
SL AMB POCT KETONES,UA: ABNORMAL
SL AMB POCT NITRITE,UA: ABNORMAL
SL AMB POCT PH,UA: 5.5
SL AMB POCT SPECIFIC GRAVITY,UA: 1.02
SL AMB POCT URINE PROTEIN: ABNORMAL
SL AMB POCT UROBILINOGEN: 0.2
SP GR UR STRIP.AUTO: 1.01 (ref 1–1.03)
UROBILINOGEN UR QL STRIP.AUTO: 0.2 E.U./DL
WBC #/AREA URNS AUTO: ABNORMAL /HPF

## 2020-10-22 PROCEDURE — 99214 OFFICE O/P EST MOD 30 MIN: CPT | Performed by: INTERNAL MEDICINE

## 2020-10-22 PROCEDURE — 81003 URINALYSIS AUTO W/O SCOPE: CPT | Performed by: INTERNAL MEDICINE

## 2020-10-22 PROCEDURE — 81001 URINALYSIS AUTO W/SCOPE: CPT | Performed by: INTERNAL MEDICINE

## 2020-10-22 PROCEDURE — 87086 URINE CULTURE/COLONY COUNT: CPT | Performed by: INTERNAL MEDICINE

## 2020-10-22 RX ORDER — CEPHALEXIN 500 MG/1
500 CAPSULE ORAL EVERY 12 HOURS SCHEDULED
Qty: 10 CAPSULE | Refills: 0 | Status: SHIPPED | OUTPATIENT
Start: 2020-10-22 | End: 2020-10-27

## 2020-10-24 LAB — BACTERIA UR CULT: NORMAL

## 2020-10-27 ENCOUNTER — OFFICE VISIT (OUTPATIENT)
Dept: INTERNAL MEDICINE CLINIC | Facility: CLINIC | Age: 85
End: 2020-10-27
Payer: COMMERCIAL

## 2020-10-27 VITALS
RESPIRATION RATE: 18 BRPM | TEMPERATURE: 97.8 F | SYSTOLIC BLOOD PRESSURE: 110 MMHG | HEART RATE: 73 BPM | DIASTOLIC BLOOD PRESSURE: 60 MMHG | OXYGEN SATURATION: 97 % | HEIGHT: 64 IN | WEIGHT: 126.6 LBS | BODY MASS INDEX: 21.61 KG/M2

## 2020-10-27 DIAGNOSIS — E55.9 VITAMIN D DEFICIENCY: ICD-10-CM

## 2020-10-27 DIAGNOSIS — E78.2 MIXED HYPERLIPIDEMIA: ICD-10-CM

## 2020-10-27 DIAGNOSIS — K21.9 GASTROESOPHAGEAL REFLUX DISEASE WITHOUT ESOPHAGITIS: Primary | ICD-10-CM

## 2020-10-27 DIAGNOSIS — I10 ESSENTIAL HYPERTENSION: ICD-10-CM

## 2020-10-27 DIAGNOSIS — I10 BENIGN ESSENTIAL HYPERTENSION: ICD-10-CM

## 2020-10-27 DIAGNOSIS — R73.9 HYPERGLYCEMIA: ICD-10-CM

## 2020-10-27 PROCEDURE — 99214 OFFICE O/P EST MOD 30 MIN: CPT | Performed by: INTERNAL MEDICINE

## 2020-10-27 RX ORDER — AMLODIPINE BESYLATE 5 MG/1
5 TABLET ORAL DAILY
Qty: 90 TABLET | Refills: 1 | Status: SHIPPED | OUTPATIENT
Start: 2020-10-27 | End: 2021-03-19 | Stop reason: SDUPTHER

## 2020-12-03 ENCOUNTER — LAB (OUTPATIENT)
Dept: LAB | Facility: IMAGING CENTER | Age: 85
End: 2020-12-03
Payer: COMMERCIAL

## 2020-12-03 DIAGNOSIS — E78.2 MIXED HYPERLIPIDEMIA: ICD-10-CM

## 2020-12-03 DIAGNOSIS — I10 BENIGN ESSENTIAL HYPERTENSION: ICD-10-CM

## 2020-12-03 LAB
ALBUMIN SERPL BCP-MCNC: 4 G/DL (ref 3.5–5)
ALP SERPL-CCNC: 100 U/L (ref 46–116)
ALT SERPL W P-5'-P-CCNC: 23 U/L (ref 12–78)
ANION GAP SERPL CALCULATED.3IONS-SCNC: 2 MMOL/L (ref 4–13)
AST SERPL W P-5'-P-CCNC: 14 U/L (ref 5–45)
BILIRUB SERPL-MCNC: 0.42 MG/DL (ref 0.2–1)
BUN SERPL-MCNC: 18 MG/DL (ref 5–25)
CALCIUM SERPL-MCNC: 10.2 MG/DL (ref 8.3–10.1)
CHLORIDE SERPL-SCNC: 106 MMOL/L (ref 100–108)
CHOLEST SERPL-MCNC: 161 MG/DL (ref 50–200)
CO2 SERPL-SCNC: 29 MMOL/L (ref 21–32)
CREAT SERPL-MCNC: 0.94 MG/DL (ref 0.6–1.3)
ERYTHROCYTE [DISTWIDTH] IN BLOOD BY AUTOMATED COUNT: 13 % (ref 11.6–15.1)
GFR SERPL CREATININE-BSD FRML MDRD: 56 ML/MIN/1.73SQ M
GLUCOSE P FAST SERPL-MCNC: 98 MG/DL (ref 65–99)
HCT VFR BLD AUTO: 43.4 % (ref 34.8–46.1)
HDLC SERPL-MCNC: 62 MG/DL
HGB BLD-MCNC: 13 G/DL (ref 11.5–15.4)
LDLC SERPL CALC-MCNC: 82 MG/DL (ref 0–100)
MCH RBC QN AUTO: 27.1 PG (ref 26.8–34.3)
MCHC RBC AUTO-ENTMCNC: 30 G/DL (ref 31.4–37.4)
MCV RBC AUTO: 91 FL (ref 82–98)
PLATELET # BLD AUTO: 280 THOUSANDS/UL (ref 149–390)
PMV BLD AUTO: 10.6 FL (ref 8.9–12.7)
POTASSIUM SERPL-SCNC: 4.1 MMOL/L (ref 3.5–5.3)
PROT SERPL-MCNC: 7.6 G/DL (ref 6.4–8.2)
RBC # BLD AUTO: 4.79 MILLION/UL (ref 3.81–5.12)
SODIUM SERPL-SCNC: 137 MMOL/L (ref 136–145)
TRIGL SERPL-MCNC: 87 MG/DL
WBC # BLD AUTO: 8.88 THOUSAND/UL (ref 4.31–10.16)

## 2020-12-03 PROCEDURE — 80061 LIPID PANEL: CPT

## 2020-12-03 PROCEDURE — 36415 COLL VENOUS BLD VENIPUNCTURE: CPT

## 2020-12-03 PROCEDURE — 85027 COMPLETE CBC AUTOMATED: CPT

## 2020-12-03 PROCEDURE — 80053 COMPREHEN METABOLIC PANEL: CPT

## 2020-12-08 ENCOUNTER — OFFICE VISIT (OUTPATIENT)
Dept: INTERNAL MEDICINE CLINIC | Facility: CLINIC | Age: 85
End: 2020-12-08
Payer: COMMERCIAL

## 2020-12-08 VITALS
BODY MASS INDEX: 21.34 KG/M2 | HEIGHT: 64 IN | WEIGHT: 125 LBS | TEMPERATURE: 97.3 F | OXYGEN SATURATION: 98 % | SYSTOLIC BLOOD PRESSURE: 118 MMHG | HEART RATE: 68 BPM | DIASTOLIC BLOOD PRESSURE: 68 MMHG

## 2020-12-08 DIAGNOSIS — G47.00 INSOMNIA, UNSPECIFIED TYPE: ICD-10-CM

## 2020-12-08 DIAGNOSIS — K21.9 GASTROESOPHAGEAL REFLUX DISEASE WITHOUT ESOPHAGITIS: Primary | ICD-10-CM

## 2020-12-08 DIAGNOSIS — E55.9 VITAMIN D DEFICIENCY: ICD-10-CM

## 2020-12-08 DIAGNOSIS — I10 BENIGN ESSENTIAL HYPERTENSION: ICD-10-CM

## 2020-12-08 DIAGNOSIS — N18.31 STAGE 3A CHRONIC KIDNEY DISEASE (HCC): ICD-10-CM

## 2020-12-08 PROCEDURE — 99214 OFFICE O/P EST MOD 30 MIN: CPT | Performed by: INTERNAL MEDICINE

## 2020-12-29 DIAGNOSIS — G47.00 INSOMNIA, UNSPECIFIED TYPE: ICD-10-CM

## 2020-12-29 DIAGNOSIS — K21.9 GASTROESOPHAGEAL REFLUX DISEASE WITHOUT ESOPHAGITIS: ICD-10-CM

## 2020-12-29 RX ORDER — OMEPRAZOLE 20 MG/1
CAPSULE, DELAYED RELEASE ORAL
Qty: 90 CAPSULE | Refills: 0 | OUTPATIENT
Start: 2020-12-29

## 2020-12-29 RX ORDER — LORAZEPAM 0.5 MG/1
TABLET ORAL
Qty: 30 TABLET | Refills: 0 | OUTPATIENT
Start: 2020-12-29

## 2020-12-29 RX ORDER — LORAZEPAM 0.5 MG/1
0.5 TABLET ORAL AS NEEDED
Qty: 30 TABLET | Refills: 0 | Status: SHIPPED | OUTPATIENT
Start: 2020-12-29 | End: 2021-03-19 | Stop reason: SDUPTHER

## 2020-12-29 RX ORDER — OMEPRAZOLE 20 MG/1
20 CAPSULE, DELAYED RELEASE ORAL DAILY
Qty: 90 CAPSULE | Refills: 0 | Status: SHIPPED | OUTPATIENT
Start: 2020-12-29 | End: 2021-03-19 | Stop reason: SDUPTHER

## 2021-01-12 ENCOUNTER — OFFICE VISIT (OUTPATIENT)
Dept: INTERNAL MEDICINE CLINIC | Facility: CLINIC | Age: 86
End: 2021-01-12
Payer: COMMERCIAL

## 2021-01-12 ENCOUNTER — HOSPITAL ENCOUNTER (OUTPATIENT)
Dept: RADIOLOGY | Facility: IMAGING CENTER | Age: 86
Discharge: HOME | End: 2021-01-12
Payer: COMMERCIAL

## 2021-01-12 VITALS
WEIGHT: 129.8 LBS | SYSTOLIC BLOOD PRESSURE: 138 MMHG | HEIGHT: 64 IN | HEART RATE: 72 BPM | DIASTOLIC BLOOD PRESSURE: 70 MMHG | TEMPERATURE: 98.6 F | BODY MASS INDEX: 22.16 KG/M2 | OXYGEN SATURATION: 99 %

## 2021-01-12 DIAGNOSIS — R07.9 CHEST PAIN, UNSPECIFIED TYPE: Primary | ICD-10-CM

## 2021-01-12 DIAGNOSIS — M79.672 PAIN IN BOTH FEET: ICD-10-CM

## 2021-01-12 DIAGNOSIS — M79.671 PAIN IN BOTH FEET: ICD-10-CM

## 2021-01-12 DIAGNOSIS — E78.2 MIXED HYPERLIPIDEMIA: ICD-10-CM

## 2021-01-12 DIAGNOSIS — E55.9 VITAMIN D DEFICIENCY: ICD-10-CM

## 2021-01-12 PROCEDURE — 73630 X-RAY EXAM OF FOOT: CPT

## 2021-01-12 PROCEDURE — 93000 ELECTROCARDIOGRAM COMPLETE: CPT | Performed by: INTERNAL MEDICINE

## 2021-01-12 PROCEDURE — 99214 OFFICE O/P EST MOD 30 MIN: CPT | Performed by: INTERNAL MEDICINE

## 2021-01-12 NOTE — PROGRESS NOTES
Assessment/Plan:    1  Bilateral feet pain  Likely secondary to osteoarthritis/plantar fasciitis  Will prescribe Voltaren gel to be apply locally b i d   Also advised to use heel cushion  She can take Tylenol as needed  Will order x-ray bilateral feet    2  Atypical chest pain  EKG done in office is unremarkable  She does have a nuclear stress test in 2017 which was normal     Likely secondary to costochondritis  She will use Voltaren gel locally  3  Essential hypertension  Blood pressure is stable on present regimen  Diagnoses and all orders for this visit:    Chest pain, unspecified type  -     POCT ECG    Mixed hyperlipidemia    Vitamin D deficiency    Pain in both feet  -     Diclofenac Sodium (VOLTAREN) 1 %; Apply 2 g topically 4 (four) times a day  -     XR foot 3+ vw left; Future  -     XR foot 3+ vw right; Future               Subjective:          Patient ID: Anant Smith is a 81 y o  female  Patient complaining of anterior chest wall pain  No palpitation no shortness of breath  No relationship with exertion  It happens randomly  Lasted for only few minutes  Also complaining of bilateral feet pain especially on the plantar surface after prolonged walking  The following portions of the patient's history were reviewed and updated as appropriate: allergies, current medications, past family history, past medical history, past social history, past surgical history and problem list     Review of Systems   Constitutional: Negative for fatigue and fever  HENT: Negative for congestion, ear discharge, ear pain, postnasal drip, sinus pressure, sore throat, tinnitus and trouble swallowing  Eyes: Negative for discharge, itching and visual disturbance  Respiratory: Negative for cough and shortness of breath  Cardiovascular: Negative for chest pain and palpitations  Gastrointestinal: Negative for abdominal pain, diarrhea, nausea and vomiting     Endocrine: Negative for cold intolerance and polyuria  Genitourinary: Negative for difficulty urinating, dysuria and urgency  Musculoskeletal: Positive for arthralgias  Negative for neck pain  Skin: Negative for rash  Allergic/Immunologic: Negative for environmental allergies  Neurological: Negative for dizziness, weakness and headaches  Psychiatric/Behavioral: Negative for agitation and behavioral problems  The patient is not nervous/anxious            Past Medical History:   Diagnosis Date    Anxiety     Last Assessed:11/24/2014    Appendicitis     Asymptomatic varicose veins     Last Assessed:10/8/2014    Esophageal reflux     Last Assessed:11/3/2014    Fracture of rib     Last Assessed:8/7/2015    Insomnia     Last Assessed:3/18/2014    Macular degeneration     Syncope     pt reports recent syncope    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)     Last Assessed:3/18/2014         Current Outpatient Medications:     acetaminophen (TYLENOL) 500 mg tablet, Take 1 tablet (500 mg total) by mouth every 4 (four) hours as needed (knee and back pain), Disp: 90 tablet, Rfl: 1    amLODIPine (NORVASC) 5 mg tablet, Take 1 tablet (5 mg total) by mouth daily, Disp: 90 tablet, Rfl: 1    atorvastatin (LIPITOR) 20 mg tablet, Take 1 tablet (20 mg total) by mouth daily, Disp: 90 tablet, Rfl: 3    Cholecalciferol (VITAMIN D3 PO), Take 1 capsule by mouth daily 2000, Disp: , Rfl:     Cranberry-Vitamin C-Probiotic (AZO CRANBERRY PO), Take by mouth daily, Disp: , Rfl:     LORazepam (ATIVAN) 0 5 mg tablet, Take 1 tablet (0 5 mg total) by mouth as needed (help sleep), Disp: 30 tablet, Rfl: 0    Multiple Vitamins-Minerals (PRESERVISION AREDS 2) CAPS, Take 1 capsule by mouth daily, Disp: 90 capsule, Rfl: 1    omeprazole (PriLOSEC) 20 mg delayed release capsule, Take 1 capsule (20 mg total) by mouth daily, Disp: 90 capsule, Rfl: 0    Diclofenac Sodium (VOLTAREN) 1 %, Apply 2 g topically 4 (four) times a day, Disp: 1 Tube, Rfl: 2    Allergies   Allergen Reactions    Daypro [Oxaprozin]      Unknown allergic reaction    Minocycline Other (See Comments)     Loss of taste       Social History   Past Surgical History:   Procedure Laterality Date    APPENDECTOMY      CATARACT EXTRACTION, BILATERAL      HYSTERECTOMY      INCISIONAL BREAST BIOPSY       Family History   Problem Relation Age of Onset    Diabetes Mother     Varicose Veins Mother     Diabetes Brother        Objective:  /70 (BP Location: Right arm, Patient Position: Sitting, Cuff Size: Standard)   Pulse 72   Temp 98 6 °F (37 °C) (Temporal)   Ht 5' 3 5" (1 613 m)   Wt 58 9 kg (129 lb 12 8 oz)   SpO2 99%   BMI 22 63 kg/m²   Body mass index is 22 63 kg/m²  Physical Exam  Constitutional:       Appearance: She is well-developed  HENT:      Head: Normocephalic  Right Ear: Ear canal and external ear normal       Left Ear: Ear canal and external ear normal       Mouth/Throat:      Pharynx: No oropharyngeal exudate or posterior oropharyngeal erythema  Eyes:      General: No scleral icterus  Pupils: Pupils are equal, round, and reactive to light  Neck:      Musculoskeletal: Normal range of motion and neck supple  Thyroid: No thyromegaly  Trachea: No tracheal deviation  Cardiovascular:      Rate and Rhythm: Normal rate and regular rhythm  Heart sounds: Normal heart sounds  Pulmonary:      Effort: Pulmonary effort is normal  No respiratory distress  Breath sounds: Normal breath sounds  Chest:      Chest wall: No tenderness  Abdominal:      General: Bowel sounds are normal       Palpations: Abdomen is soft  There is no mass  Tenderness: There is no abdominal tenderness  Musculoskeletal: Normal range of motion  General: Tenderness present  Right lower leg: No edema  Left lower leg: No edema  Comments: Mild tenderness over both heels noted     Lymphadenopathy:      Cervical: No cervical adenopathy  Skin:     General: Skin is warm  Neurological:      Mental Status: She is alert and oriented to person, place, and time  Cranial Nerves: No cranial nerve deficit  Psychiatric:         Mood and Affect: Mood normal          Behavior: Behavior normal          Thought Content:  Thought content normal          Judgment: Judgment normal

## 2021-01-18 ENCOUNTER — TELEPHONE (OUTPATIENT)
Dept: INTERNAL MEDICINE CLINIC | Facility: CLINIC | Age: 86
End: 2021-01-18

## 2021-01-19 NOTE — TELEPHONE ENCOUNTER
I called the patient with the xray results  She asked if she should see a foot doctor and my recommendation was that if she would like other treatment recommendations or further evaluation, she should call a podiatrist and schedule  She will call back for any further problems/needs

## 2021-02-17 ENCOUNTER — TELEPHONE (OUTPATIENT)
Dept: INTERNAL MEDICINE CLINIC | Facility: CLINIC | Age: 86
End: 2021-02-17

## 2021-02-17 DIAGNOSIS — N18.31 STAGE 3A CHRONIC KIDNEY DISEASE (HCC): ICD-10-CM

## 2021-02-17 DIAGNOSIS — Z13.820 OSTEOPOROSIS SCREENING: ICD-10-CM

## 2021-02-17 DIAGNOSIS — E55.9 VITAMIN D DEFICIENCY: Primary | ICD-10-CM

## 2021-02-17 NOTE — TELEPHONE ENCOUNTER
Patient calling to see if we can place an order in her chart for a Dxa bone scan so she can call and schedule

## 2021-02-24 ENCOUNTER — HOSPITAL ENCOUNTER (OUTPATIENT)
Dept: RADIOLOGY | Facility: IMAGING CENTER | Age: 86
Discharge: HOME | End: 2021-02-24
Payer: COMMERCIAL

## 2021-02-24 DIAGNOSIS — E55.9 VITAMIN D DEFICIENCY: ICD-10-CM

## 2021-02-24 DIAGNOSIS — N18.31 STAGE 3A CHRONIC KIDNEY DISEASE (HCC): ICD-10-CM

## 2021-02-24 DIAGNOSIS — Z13.820 OSTEOPOROSIS SCREENING: ICD-10-CM

## 2021-02-24 PROCEDURE — 77080 DXA BONE DENSITY AXIAL: CPT

## 2021-03-19 DIAGNOSIS — E78.2 MIXED HYPERLIPIDEMIA: ICD-10-CM

## 2021-03-19 DIAGNOSIS — G47.00 INSOMNIA, UNSPECIFIED TYPE: ICD-10-CM

## 2021-03-19 DIAGNOSIS — K21.9 GASTROESOPHAGEAL REFLUX DISEASE WITHOUT ESOPHAGITIS: ICD-10-CM

## 2021-03-19 DIAGNOSIS — I10 ESSENTIAL HYPERTENSION: ICD-10-CM

## 2021-03-19 RX ORDER — LORAZEPAM 0.5 MG/1
0.5 TABLET ORAL AS NEEDED
Qty: 30 TABLET | Refills: 0 | Status: SHIPPED | OUTPATIENT
Start: 2021-03-19 | End: 2021-06-24 | Stop reason: SDUPTHER

## 2021-03-19 RX ORDER — OMEPRAZOLE 20 MG/1
20 CAPSULE, DELAYED RELEASE ORAL DAILY
Qty: 90 CAPSULE | Refills: 1 | Status: SHIPPED | OUTPATIENT
Start: 2021-03-19 | End: 2021-09-20 | Stop reason: SDUPTHER

## 2021-03-19 RX ORDER — ATORVASTATIN CALCIUM 20 MG/1
20 TABLET, FILM COATED ORAL DAILY
Qty: 90 TABLET | Refills: 1 | Status: SHIPPED | OUTPATIENT
Start: 2021-03-19 | End: 2021-08-09 | Stop reason: SDUPTHER

## 2021-03-19 RX ORDER — AMLODIPINE BESYLATE 5 MG/1
5 TABLET ORAL DAILY
Qty: 90 TABLET | Refills: 1 | Status: SHIPPED | OUTPATIENT
Start: 2021-03-19 | End: 2021-08-09 | Stop reason: SDUPTHER

## 2021-03-19 NOTE — TELEPHONE ENCOUNTER
Patient in need of scripts sent to [address] in [place]  Lorazepam 0 5mg  1 daily #30  Omeprazole 20 mg  1 daily #90  Amlodipine 5mg 1 daily #90  Atorvastatin 20 mg 1 daily #90    Patient would also like her results of her Bone Density Test that she had done on   02/24/2021

## 2021-03-25 ENCOUNTER — OFFICE VISIT (OUTPATIENT)
Dept: INTERNAL MEDICINE CLINIC | Facility: CLINIC | Age: 86
End: 2021-03-25
Payer: COMMERCIAL

## 2021-03-25 VITALS
TEMPERATURE: 99.8 F | DIASTOLIC BLOOD PRESSURE: 54 MMHG | RESPIRATION RATE: 18 BRPM | SYSTOLIC BLOOD PRESSURE: 114 MMHG | HEIGHT: 64 IN | WEIGHT: 130.4 LBS | OXYGEN SATURATION: 97 % | BODY MASS INDEX: 22.26 KG/M2 | HEART RATE: 86 BPM

## 2021-03-25 DIAGNOSIS — T14.8XXA POST-TRAUMATIC WOUND INFECTION: ICD-10-CM

## 2021-03-25 DIAGNOSIS — T14.8XXA WOUND INFECTION: Primary | ICD-10-CM

## 2021-03-25 DIAGNOSIS — L08.9 WOUND INFECTION: Primary | ICD-10-CM

## 2021-03-25 DIAGNOSIS — L08.9 POST-TRAUMATIC WOUND INFECTION: ICD-10-CM

## 2021-03-25 PROCEDURE — 99214 OFFICE O/P EST MOD 30 MIN: CPT | Performed by: INTERNAL MEDICINE

## 2021-03-25 RX ORDER — SULFAMETHOXAZOLE AND TRIMETHOPRIM 800; 160 MG/1; MG/1
1 TABLET ORAL EVERY 12 HOURS SCHEDULED
Qty: 10 TABLET | Refills: 0 | Status: SHIPPED | OUTPATIENT
Start: 2021-03-25 | End: 2021-03-30

## 2021-03-25 RX ORDER — SULFAMETHOXAZOLE AND TRIMETHOPRIM 800; 160 MG/1; MG/1
1 TABLET ORAL EVERY 12 HOURS SCHEDULED
Status: DISCONTINUED | OUTPATIENT
Start: 2021-03-25 | End: 2021-03-25

## 2021-03-25 NOTE — ASSESSMENT & PLAN NOTE
· Patient hit her right anterior lower extremity against her 's door  · She has an exposed 2 cm wound that appears to look infected   Erythematous margins surrounding the wound  · Tender to palpation  · Wound was irrigated  · Will be receiving 5 days of Bactrim DS and wound care instructions were provided

## 2021-04-16 ENCOUNTER — TELEPHONE (OUTPATIENT)
Dept: INTERNAL MEDICINE CLINIC | Facility: CLINIC | Age: 86
End: 2021-04-16

## 2021-04-16 NOTE — TELEPHONE ENCOUNTER
Pt received bill for dexa scan she was not happy  It was for 50 dollars  She states she made many phone calls and was told it was the way we coded it  Needs to be preventative not vit d def  She wants a call back regarding this

## 2021-04-21 DIAGNOSIS — M81.0 AGE-RELATED OSTEOPOROSIS WITHOUT CURRENT PATHOLOGICAL FRACTURE: Primary | ICD-10-CM

## 2021-04-23 NOTE — TELEPHONE ENCOUNTER
Dr Jeannie Walter put in the diagnosis code m81 [id number] for us to resubmit the bill    Resent to the billing department to resubmit with new diagnosis code

## 2021-05-08 DIAGNOSIS — E78.2 MIXED HYPERLIPIDEMIA: ICD-10-CM

## 2021-05-09 RX ORDER — ATORVASTATIN CALCIUM 20 MG/1
TABLET, FILM COATED ORAL
Qty: 90 TABLET | Refills: 0 | OUTPATIENT
Start: 2021-05-09

## 2021-05-24 ENCOUNTER — OFFICE VISIT (OUTPATIENT)
Dept: INTERNAL MEDICINE CLINIC | Facility: CLINIC | Age: 86
End: 2021-05-24
Payer: COMMERCIAL

## 2021-05-24 ENCOUNTER — TELEPHONE (OUTPATIENT)
Dept: INTERNAL MEDICINE CLINIC | Facility: CLINIC | Age: 86
End: 2021-05-24

## 2021-05-24 VITALS
TEMPERATURE: 98.6 F | OXYGEN SATURATION: 98 % | WEIGHT: 132.2 LBS | SYSTOLIC BLOOD PRESSURE: 136 MMHG | HEART RATE: 64 BPM | BODY MASS INDEX: 22.57 KG/M2 | HEIGHT: 64 IN | DIASTOLIC BLOOD PRESSURE: 62 MMHG

## 2021-05-24 DIAGNOSIS — E78.2 MIXED HYPERLIPIDEMIA: ICD-10-CM

## 2021-05-24 DIAGNOSIS — K21.9 GASTROESOPHAGEAL REFLUX DISEASE WITHOUT ESOPHAGITIS: Primary | ICD-10-CM

## 2021-05-24 DIAGNOSIS — E55.9 VITAMIN D DEFICIENCY: ICD-10-CM

## 2021-05-24 DIAGNOSIS — N18.31 STAGE 3A CHRONIC KIDNEY DISEASE (HCC): ICD-10-CM

## 2021-05-24 DIAGNOSIS — Z00.00 MEDICARE ANNUAL WELLNESS VISIT, SUBSEQUENT: ICD-10-CM

## 2021-05-24 DIAGNOSIS — I10 BENIGN ESSENTIAL HYPERTENSION: ICD-10-CM

## 2021-05-24 DIAGNOSIS — M81.0 AGE-RELATED OSTEOPOROSIS WITHOUT CURRENT PATHOLOGICAL FRACTURE: ICD-10-CM

## 2021-05-24 DIAGNOSIS — K52.9 GASTROENTERITIS: ICD-10-CM

## 2021-05-24 PROCEDURE — G0439 PPPS, SUBSEQ VISIT: HCPCS | Performed by: INTERNAL MEDICINE

## 2021-05-24 PROCEDURE — 99214 OFFICE O/P EST MOD 30 MIN: CPT | Performed by: INTERNAL MEDICINE

## 2021-05-24 NOTE — PROGRESS NOTES
Assessment/Plan:    1  Osteoporosis  For T-score is  -3 2 in left hip  Discussed regarding treatment options including bisphosphonates and Prolia injections  She is more inclined to was Prolia injection and will work with insurance for approval     Jay Fabian to continue with vitamin D3 2000 units and calcium at least 1000 mg daily  Also advised for weight-bearing exercises    2  Essential hypertension  Blood pressure is stable on present regimen    3  Hyperlipidemia  Continue with present dose of atorvastatin 20 mg daily  Will check lipid profile before next visit    4  GERD  Continue with omeprazole 20 mg daily     Diagnoses and all orders for this visit:    Gastroesophageal reflux disease without esophagitis  -     CBC; Future    Gastroenteritis    Benign essential hypertension    Stage 3a chronic kidney disease (HCC)  -     Basic metabolic panel; Future    Age-related osteoporosis without current pathological fracture    Mixed hyperlipidemia  -     Lipid Panel with Direct LDL reflex; Future  -     ALT; Future  -     AST; Future    Vitamin D deficiency    Medicare annual wellness visit, subsequent               Subjective:          Patient ID: Melissa Tan is a 81 y o  female      HPI    The following portions of the patient's history were reviewed and updated as appropriate: allergies, current medications, past family history, past medical history, past social history, past surgical history and problem list     Review of Systems      Past Medical History:   Diagnosis Date    Anxiety     Last Assessed:11/24/2014    Appendicitis     Asymptomatic varicose veins     Last Assessed:10/8/2014    Esophageal reflux     Last Assessed:11/3/2014    Fracture of rib     Last Assessed:8/7/2015    Insomnia     Last Assessed:3/18/2014    Macular degeneration     Syncope     pt reports recent syncope    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)     Last Assessed:3/18/2014         Current Outpatient Medications:     acetaminophen (TYLENOL) 500 mg tablet, Take 1 tablet (500 mg total) by mouth every 4 (four) hours as needed (knee and back pain), Disp: 90 tablet, Rfl: 1    amLODIPine (NORVASC) 5 mg tablet, Take 1 tablet (5 mg total) by mouth daily, Disp: 90 tablet, Rfl: 1    atorvastatin (LIPITOR) 20 mg tablet, Take 1 tablet (20 mg total) by mouth daily, Disp: 90 tablet, Rfl: 1    Cholecalciferol (VITAMIN D3 PO), Take 1 capsule by mouth daily 2000, Disp: , Rfl:     Cranberry-Vitamin C-Probiotic (AZO CRANBERRY PO), Take by mouth daily, Disp: , Rfl:     Diclofenac Sodium (VOLTAREN) 1 %, Apply 2 g topically 4 (four) times a day, Disp: 1 Tube, Rfl: 2    LORazepam (ATIVAN) 0 5 mg tablet, Take 1 tablet (0 5 mg total) by mouth as needed (help sleep), Disp: 30 tablet, Rfl: 0    Multiple Vitamins-Minerals (PRESERVISION AREDS 2) CAPS, Take 1 capsule by mouth daily, Disp: 90 capsule, Rfl: 1    omeprazole (PriLOSEC) 20 mg delayed release capsule, Take 1 capsule (20 mg total) by mouth daily, Disp: 90 capsule, Rfl: 1    Allergies   Allergen Reactions    Daypro [Oxaprozin]      Unknown allergic reaction    Minocycline Other (See Comments)     Loss of taste       Social History   Past Surgical History:   Procedure Laterality Date    APPENDECTOMY      CATARACT EXTRACTION, BILATERAL      HYSTERECTOMY      INCISIONAL BREAST BIOPSY       Family History   Problem Relation Age of Onset    Diabetes Mother     Varicose Veins Mother     Diabetes Brother        Objective:  /62 (BP Location: Left arm, Patient Position: Sitting, Cuff Size: Standard)   Pulse 64   Temp 98 6 °F (37 °C) (Temporal)   Ht 5' 3 5" (1 613 m)   Wt 60 kg (132 lb 3 2 oz)   SpO2 98%   BMI 23 05 kg/m²   Body mass index is 23 05 kg/m²       Physical Exam

## 2021-05-24 NOTE — PROGRESS NOTES
Assessment and Plan:     Problem List Items Addressed This Visit     None           Preventive health issues were discussed with patient, and age appropriate screening tests were ordered as noted in patient's After Visit Summary  Personalized health advice and appropriate referrals for health education or preventive services given if needed, as noted in patient's After Visit Summary       History of Present Illness:     Patient presents for Medicare Annual Wellness visit    Patient Care Team:  Marco Deal MD as PCP - General  Mary Ellen Almaguer PA-C     Problem List:     Patient Active Problem List   Diagnosis    Benign essential hypertension    Mixed hyperlipidemia    Vitamin D deficiency    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    Gastroesophageal reflux disease without esophagitis    Age-related cataract of both eyes    CKD (chronic kidney disease) stage 3, GFR 30-59 ml/min ([facility])    Tear of skin of heel, initial encounter    Cracked skin on feet    Hyperglycemia    Insomnia    History of basal cell carcinoma    Vasovagal syncope    Lightheadedness    Postural dizziness with presyncope    Gastroenteritis    Urinary tract infection with hematuria    Intermittent diarrhea    Pain in both feet    Post-traumatic wound infection    Age-related osteoporosis without current pathological fracture      Past Medical and Surgical History:     Past Medical History:   Diagnosis Date    Anxiety     Last Assessed:11/24/2014    Appendicitis     Asymptomatic varicose veins     Last Assessed:10/8/2014    Esophageal reflux     Last Assessed:11/3/2014    Fracture of rib     Last Assessed:8/7/2015    Insomnia     Last Assessed:3/18/2014    Macular degeneration     Syncope     pt reports recent syncope    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 ([facility] [address] )     Last Assessed:3/18/2014     Past Surgical History:   Procedure Laterality Date    APPENDECTOMY      CATARACT EXTRACTION, BILATERAL      HYSTERECTOMY      INCISIONAL BREAST BIOPSY        Family History:     Family History   Problem Relation Age of Onset    Diabetes Mother     Varicose Veins Mother     Diabetes Brother       Social History:     E-Cigarette/Vaping    E-Cigarette Use Never User      E-Cigarette/Vaping Substances    Nicotine No     THC No     CBD No     Flavoring No     Other No     Unknown No      Social History     Socioeconomic History    Marital status: /Civil Union     Spouse name: Not on file    Number of children: Not on file    Years of education: Not on file    Highest education level: Not on file   Occupational History    Not on file   Social Needs    Financial resource strain: Not on file    Food insecurity     Worry: Not on file     Inability: Not on file    Transportation needs     Medical: Not on file     Non-medical: Not on file   Tobacco Use    Smoking status: Never Smoker    Smokeless tobacco: Never Used   Substance and Sexual Activity    Alcohol use: Yes     Frequency: 2-3 times a week     Drinks per session: 3 or 4     Binge frequency: Never     Comment: socially    Drug use: No    Sexual activity: Not Currently   Lifestyle    Physical activity     Days per week: Not on file     Minutes per session: Not on file    Stress: Not on file   Relationships    Social connections     Talks on phone: Not on file     Gets together: Not on file     Attends Synagogue service: Not on file     Active member of club or organization: Not on file     Attends meetings of clubs or organizations: Not on file     Relationship status: Not on file    Intimate partner violence     Fear of current or ex partner: Not on file     Emotionally abused: Not on file     Physically abused: Not on file     Forced sexual activity: Not on file   Other Topics Concern    Not on file   Social History Narrative    Not on file      Medications and Allergies:     Current Outpatient Medications Medication Sig Dispense Refill    acetaminophen (TYLENOL) 500 mg tablet Take 1 tablet (500 mg total) by mouth every 4 (four) hours as needed (knee and back pain) 90 tablet 1    amLODIPine (NORVASC) 5 mg tablet Take 1 tablet (5 mg total) by mouth daily 90 tablet 1    atorvastatin (LIPITOR) 20 mg tablet Take 1 tablet (20 mg total) by mouth daily 90 tablet 1    Cholecalciferol (VITAMIN D3 PO) Take 1 capsule by mouth daily 2000      Cranberry-Vitamin C-Probiotic (AZO CRANBERRY PO) Take by mouth daily      Diclofenac Sodium (VOLTAREN) 1 % Apply 2 g topically 4 (four) times a day 1 Tube 2    LORazepam (ATIVAN) 0 5 mg tablet Take 1 tablet (0 5 mg total) by mouth as needed (help sleep) 30 tablet 0    Multiple Vitamins-Minerals (PRESERVISION AREDS 2) CAPS Take 1 capsule by mouth daily 90 capsule 1    omeprazole (PriLOSEC) 20 mg delayed release capsule Take 1 capsule (20 mg total) by mouth daily 90 capsule 1     No current facility-administered medications for this visit  Allergies   Allergen Reactions    Daypro [Oxaprozin]      Unknown allergic reaction    Minocycline Other (See Comments)     Loss of taste      Immunizations:     Immunization History   Administered Date(s) Administered    INFLUENZA 10/30/2005, 12/12/2006, 10/05/2007, 10/23/2009, 10/01/2010, 10/03/2011, 10/10/2011, 10/01/2013, 10/02/2014, 09/18/2015, 10/06/2016    Influenza Split High Dose Preservative Free IM 10/02/2014, 09/18/2015, 10/06/2016, 10/01/2019    Influenza, high dose seasonal 0 7 mL 10/01/2018, 09/21/2020    Influenza, seasonal, injectable 10/01/2013    Pneumococcal Conjugate 13-Valent 04/29/2016    Pneumococcal Polysaccharide PPV23 11/01/2004    SARS-CoV-2 / COVID-19 mRNA IM (Pfizer-BioNTech) 02/17/2021, 03/10/2021    Td (adult), Unspecified 10/31/2005    Zoster 11/01/2013    Zoster Vaccine Recombinant 10/28/2020, 12/29/2020      Health Maintenance:      There are no preventive care reminders to display for this patient  Topic Date Due    DTaP,Tdap,and Td Vaccines (1 - Tdap) 08/03/1956      Medicare Health Risk Assessment:     There were no vitals taken for this visit  Robbie Pepper is here for her Subsequent Wellness visit  Last Medicare Wellness visit information reviewed, patient interviewed and updates made to the record today  Health Risk Assessment:   Patient rates overall health as good  Patient feels that their physical health rating is slightly worse  Patient is satisfied with their life  Eyesight was rated as slightly worse  Hearing was rated as slightly worse  Patient feels that their emotional and mental health rating is same  Patients states they are never, rarely angry  Patient states they are sometimes unusually tired/fatigued  Pain experienced in the last 7 days has been some  Patient's pain rating has been 2/10  Patient states that she has experienced no weight loss or gain in last 6 months  Shoulder and neck pain    Depression Screening:   PHQ-2 Score: 0      Fall Risk Screening: In the past year, patient has experienced: no history of falling in past year      Urinary Incontinence Screening:   Patient has leaked urine accidently in the last six months  Home Safety:  Patient does not have trouble with stairs inside or outside of their home  Patient has working smoke alarms and has no working carbon monoxide detector  Home safety hazards include: none  Nutrition:   Current diet is Regular  Medications:   Patient is currently taking over-the-counter supplements  OTC medications include: see medication list  Patient is able to manage medications  Activities of Daily Living (ADLs)/Instrumental Activities of Daily Living (IADLs):   Walk and transfer into and out of bed and chair?: Yes  Dress and groom yourself?: Yes    Bathe or shower yourself?: Yes    Feed yourself?  Yes  Do your laundry/housekeeping?: Yes  Manage your money, pay your bills and track your expenses?: Yes  Make your own meals?: Yes    Do your own shopping?: Yes    Previous Hospitalizations:   Any hospitalizations or ED visits within the last 12 months?: No      Advance Care Planning:   Living will: Yes    Advanced directive: Yes      Cognitive Screening:   Provider or family/friend/caregiver concerned regarding cognition?: No    PREVENTIVE SCREENINGS      Cardiovascular Screening:    General: Screening Not Indicated and History Lipid Disorder      Diabetes Screening:     General: Screening Current      Breast Cancer Screening:     General: Screening Current      Cervical Cancer Screening:    General: Screening Not Indicated      Osteoporosis Screening:    General: Screening Not Indicated and History Osteoporosis      Lung Cancer Screening:     General: Screening Not Indicated    Screening, Brief Intervention, and Referral to Treatment (SBIRT)    Screening  Typical number of drinks in a day: 1  Typical number of drinks in a week: 1  Interpretation: Low risk drinking behavior  AUDIT-C Screenin) How often did you have a drink containing alcohol in the past year? 2 to 3 times a week  2) How many drinks did you have on a typical day when you were drinking in the past year? 3 to 4  3) How often did you have 6 or more drinks on one occasion in the past year? never    AUDIT-C Score: 4  Interpretation: Score 3-12 (female): POSITIVE screen for alcohol misuse    AUDIT Screenin) How often during the last year have you found that you were not able to stop drinking once you had started? 0 - never  5) How often during the last year have you failed to do what was normally expected from you because of drinking? 0 - never  6) How often during the last year have you needed a first drink in the morning to get yourself going after a heavy drinking session?  0 - never  7) How often during the last year have you had a feeling of guilt or remorse after drinking? 0 - never  8) How often during the last year have you been unable to remember what happened the night before because you had been drinking? 0 - never  9) Have you or someone else been injured as a result of your drinking? 0 - no  10) Has a relative or friend or a doctor or another health worker been concerned about your drinking or suggested you cut down? 0 - no    AUDIT Score: 4  Interpretation: Low risk alcohol consumption    Single Item Drug Screening:  How often have you used an illegal drug (including marijuana) or a prescription medication for non-medical reasons in the past year? never    Single Item Drug Screen Score: 0  Interpretation: Negative screen for possible drug use disorder    Brief Intervention  Alcohol & drug use screenings were reviewed  No concerns regarding substance use disorder identified  Other Counseling Topics:   Car/seat belt/driving safety, skin self-exam, sunscreen and calcium and vitamin D intake and regular weightbearing exercise         Lizbet Leung MD

## 2021-05-24 NOTE — PATIENT INSTRUCTIONS
Medicare Preventive Visit Patient Instructions  Thank you for completing your Welcome to Medicare Visit or Medicare Annual Wellness Visit today  Your next wellness visit will be due in one year (5/25/2022)  The screening/preventive services that you may require over the next 5-10 years are detailed below  Some tests may not apply to you based off risk factors and/or age  Screening tests ordered at today's visit but not completed yet may show as past due  Also, please note that scanned in results may not display below  Preventive Screenings:  Service Recommendations Previous Testing/Comments   Colorectal Cancer Screening  * Colonoscopy    * Fecal Occult Blood Test (FOBT)/Fecal Immunochemical Test (FIT)  * Fecal DNA/Cologuard Test  * Flexible Sigmoidoscopy Age: 54-65 years old   Colonoscopy: every 10 years (may be performed more frequently if at higher risk)  OR  FOBT/FIT: every 1 year  OR  Cologuard: every 3 years  OR  Sigmoidoscopy: every 5 years  Screening may be recommended earlier than age 48 if at higher risk for colorectal cancer  Also, an individualized decision between you and your healthcare provider will decide whether screening between the ages of 74-80 would be appropriate  Colonoscopy: 01/06/2007  FOBT/FIT: Not on file  Cologuard: Not on file  Sigmoidoscopy: Not on file          Breast Cancer Screening Age: 36 years old  Frequency: every 1-2 years  Not required if history of left and right mastectomy Mammogram: Not on file        Cervical Cancer Screening Between the ages of 21-29, pap smear recommended once every 3 years  Between the ages of 33-67, can perform pap smear with HPV co-testing every 5 years     Recommendations may differ for women with a history of total hysterectomy, cervical cancer, or abnormal pap smears in past  Pap Smear: Not on file    Screening Not Indicated   Hepatitis C Screening Once for adults born [facility]  More frequently in patients at high risk for Hepatitis C Hep C Antibody: Not on file        Diabetes Screening 1-2 times per year if you're at risk for diabetes or have pre-diabetes Fasting glucose: 98 mg/dL   A1C: 5 8 %    Screening Current   Cholesterol Screening Once every 5 years if you don't have a lipid disorder  May order more often based on risk factors  Lipid panel: 12/03/2020    Screening Not Indicated  History Lipid Disorder     Other Preventive Screenings Covered by Medicare:  1  Abdominal Aortic Aneurysm (AAA) Screening: covered once if your at risk  You're considered to be at risk if you have a family history of AAA  2  Lung Cancer Screening: covers low dose CT scan once per year if you meet all of the following conditions: (1) Age 50-69; (2) No signs or symptoms of lung cancer; (3) Current smoker or have quit smoking within the last 15 years; (4) You have a tobacco smoking history of at least 30 pack years (packs per day multiplied by number of years you smoked); (5) You get a written order from a healthcare provider  3  Glaucoma Screening: covered annually if you're considered high risk: (1) You have diabetes OR (2) Family history of glaucoma OR (3)  aged 48 and older OR (3)  American aged 72 and older  3  Osteoporosis Screening: covered every 2 years if you meet one of the following conditions: (1) You're estrogen deficient and at risk for osteoporosis based off medical history and other findings; (2) Have a vertebral abnormality; (3) On glucocorticoid therapy for more than 3 months; (4) Have primary hyperparathyroidism; (5) On osteoporosis medications and need to assess response to drug therapy  · Last bone density test (DXA Scan): 02/24/2021   5  HIV Screening: covered annually if you're between the age of 15-65  Also covered annually if you are younger than 13 and older than 72 with risk factors for HIV infection  For pregnant patients, it is covered up to 3 times per pregnancy      Immunizations:  Immunization Recommendations   Influenza Vaccine Annual influenza vaccination during flu season is recommended for all persons aged >= 6 months who do not have contraindications   Pneumococcal Vaccine (Prevnar and Pneumovax)  * Prevnar = PCV13  * Pneumovax = PPSV23   Adults 25-60 years old: 1-3 doses may be recommended based on certain risk factors  Adults 72 years old: Prevnar (PCV13) vaccine recommended followed by Pneumovax (PPSV23) vaccine  If already received PPSV23 since turning 65, then PCV13 recommended at least one year after PPSV23 dose  Hepatitis B Vaccine 3 dose series if at intermediate or high risk (ex: diabetes, end stage renal disease, liver disease)   Tetanus (Td) Vaccine - COST NOT COVERED BY MEDICARE PART B Following completion of primary series, a booster dose should be given every 10 years to maintain immunity against tetanus  Td may also be given as tetanus wound prophylaxis  Tdap Vaccine - COST NOT COVERED BY MEDICARE PART B Recommended at least once for all adults  For pregnant patients, recommended with each pregnancy  Shingles Vaccine (Shingrix) - COST NOT COVERED BY MEDICARE PART B  2 shot series recommended in those aged 48 and above     Health Maintenance Due:  There are no preventive care reminders to display for this patient  Immunizations Due:      Topic Date Due    DTaP,Tdap,and Td Vaccines (1 - Tdap) 08/03/1956     Advance Directives   What are advance directives? Advance directives are legal documents that state your wishes and plans for medical care  These plans are made ahead of time in case you lose your ability to make decisions for yourself  Advance directives can apply to any medical decision, such as the treatments you want, and if you want to donate organs  What are the types of advance directives? There are many types of advance directives, and each state has rules about how to use them  You may choose a combination of any of the following:  · Living will:   This is a written record of the treatment you want  You can also choose which treatments you do not want, which to limit, and which to stop at a certain time  This includes surgery, medicine, IV fluid, and tube feedings  · Durable power of  for healthcare [facility] [facility]): This is a written record that states who you want to make healthcare choices for you when you are unable to make them for yourself  This person, called a proxy, is usually a family member or a friend  You may choose more than 1 proxy  · Do not resuscitate (DNR) order:  A DNR order is used in case your heart stops beating or you stop breathing  It is a request not to have certain forms of treatment, such as CPR  A DNR order may be included in other types of advance directives  · Medical directive: This covers the care that you want if you are in a coma, near death, or unable to make decisions for yourself  You can list the treatments you want for each condition  Treatment may include pain medicine, surgery, blood transfusions, dialysis, IV or tube feedings, and a ventilator (breathing machine)  · Values history: This document has questions about your views, beliefs, and how you feel and think about life  This information can help others choose the care that you would choose  Why are advance directives important? An advance directive helps you control your care  Although spoken wishes may be used, it is better to have your wishes written down  Spoken wishes can be misunderstood, or not followed  Treatments may be given even if you do not want them  An advance directive may make it easier for your family to make difficult choices about your care  © Copyright [employer] 2018 Information is for End User's use only and may not be sold, redistributed or otherwise used for commercial purposes   All illustrations and images included in CareNotes® are the copyrighted property of [employer]  or [facility] [employer]

## 2021-06-23 DIAGNOSIS — G47.00 INSOMNIA, UNSPECIFIED TYPE: ICD-10-CM

## 2021-06-23 NOTE — TELEPHONE ENCOUNTER
Patient called asking for her medications to be sent in, all of them, when she was here in may for appointment  They were supposed to be sent in

## 2021-06-23 NOTE — TELEPHONE ENCOUNTER
Pt's meds were sent in March for 90 + 1 refills  Pt has refills at pharm  [facility] [place]

## 2021-06-24 RX ORDER — LORAZEPAM 0.5 MG/1
0.5 TABLET ORAL AS NEEDED
Qty: 30 TABLET | Refills: 0 | Status: SHIPPED | OUTPATIENT
Start: 2021-06-24 | End: 2021-09-20 | Stop reason: SDUPTHER

## 2021-07-23 ENCOUNTER — TELEPHONE (OUTPATIENT)
Dept: INTERNAL MEDICINE CLINIC | Facility: CLINIC | Age: 86
End: 2021-07-23

## 2021-07-23 NOTE — TELEPHONE ENCOUNTER
[employer] called and relayed that patient had a DXA bone density spine hip and pelvis completed on 02/24/2021 and received a copay for this  It was coded as an xray diagnostic study and the patient is relaying that it should have been coded as a preventative study  Health insurance company stated that they did speak with the billing department but was instructed to reach out to the office to get the diagnosis codes switched to reflect this  Can this be updated and resubmitted? The insurance company was requesting us to reach out to patient with update on this

## 2021-07-29 NOTE — PROGRESS NOTES
Assessment/Plan:    1  Acute cystitis  Will start patient on Keflex 250 mg p o  Q 8 hours for 1 week  Also advised to drink plenty of fluid  Also advised take cranberry tablets on daily basis  2  Essential hypertension  Blood pressure is stable on present regimen    3  GERD  Continue with Prilosec 20 mg daily     Diagnoses and all orders for this visit:    UTI symptoms  -     POCT urine dip  -     cephalexin (KEFLEX) 250 mg capsule; Take 1 capsule (250 mg total) by mouth every 8 (eight) hours for 7 days    Acute cystitis without hematuria  -     cephalexin (KEFLEX) 250 mg capsule; Take 1 capsule (250 mg total) by mouth every 8 (eight) hours for 7 days    Mixed hyperlipidemia  -     cephalexin (KEFLEX) 250 mg capsule; Take 1 capsule (250 mg total) by mouth every 8 (eight) hours for 7 days    Gastroesophageal reflux disease without esophagitis    Benign essential hypertension  -     cephalexin (KEFLEX) 250 mg capsule; Take 1 capsule (250 mg total) by mouth every 8 (eight) hours for 7 days               Subjective:          Patient ID: Tarik Balbuena is a 81 y o  female  Patient came to office with a complain of frequency and dysuria over the last couple of days  Denied any fever chills  The following portions of the patient's history were reviewed and updated as appropriate: allergies, current medications, past family history, past medical history, past social history, past surgical history and problem list     Review of Systems   Constitutional: Negative for fatigue and fever  HENT: Negative for congestion, ear discharge, ear pain, postnasal drip, sinus pressure, sore throat, tinnitus and trouble swallowing  Eyes: Negative for discharge, itching and visual disturbance  Respiratory: Negative for cough and shortness of breath  Cardiovascular: Negative for chest pain and palpitations  Gastrointestinal: Negative for abdominal pain, diarrhea, nausea and vomiting     Endocrine: Negative for cold Mother is coming in to  a copy of the vaccine record.   Patient needs an MD appt for a physical and more vaccines.   Patient was last seen in 7/15/2019 by Dr. Moon.    intolerance and polyuria  Genitourinary: Positive for dysuria and frequency  Negative for difficulty urinating, flank pain and urgency  Musculoskeletal: Negative for arthralgias and neck pain  Skin: Negative for rash  Allergic/Immunologic: Negative for environmental allergies  Neurological: Negative for dizziness, weakness and headaches  Psychiatric/Behavioral: Negative for agitation and behavioral problems  The patient is not nervous/anxious            Past Medical History:   Diagnosis Date    Anxiety     Last Assessed:11/24/2014    Appendicitis     Asymptomatic varicose veins     Last Assessed:10/8/2014    Esophageal reflux     Last Assessed:11/3/2014    Fracture of rib     Last Assessed:8/7/2015    Insomnia     Last Assessed:3/18/2014    Macular degeneration     Syncope     pt reports recent syncope    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)     Last Assessed:3/18/2014         Current Outpatient Medications:     acetaminophen (TYLENOL) 500 mg tablet, Take 1 tablet (500 mg total) by mouth every 4 (four) hours as needed (knee and back pain), Disp: 90 tablet, Rfl: 1    amLODIPine (NORVASC) 10 mg tablet, Take 1 tablet (10 mg total) by mouth daily, Disp: 90 tablet, Rfl: 1    atorvastatin (LIPITOR) 20 mg tablet, Take 1 tablet (20 mg total) by mouth daily, Disp: 90 tablet, Rfl: 3    Cholecalciferol (VITAMIN D3 PO), Take 1 capsule by mouth daily 2000, Disp: , Rfl:     LORazepam (ATIVAN) 0 5 mg tablet, Take 1 tablet (0 5 mg total) by mouth as needed (help sleep), Disp: 30 tablet, Rfl: 0    Multiple Vitamins-Minerals (PRESERVISION AREDS 2) CAPS, Take 1 capsule by mouth daily (Patient taking differently: Take 2 capsules by mouth daily ), Disp: 90 capsule, Rfl: 1    omeprazole (PriLOSEC) 20 mg delayed release capsule, Take 1 capsule (20 mg total) by mouth daily, Disp: 30 capsule, Rfl: 1    cephalexin (KEFLEX) 250 mg capsule, Take 1 capsule (250 mg total) by mouth every 8 (eight) hours for 7 days, Disp: 21 capsule, Rfl: 0    Allergies   Allergen Reactions    Daypro [Oxaprozin]      Unknown allergic reaction    Minocycline Other (See Comments)     Loss of taste       Social History   Past Surgical History:   Procedure Laterality Date    APPENDECTOMY      CATARACT EXTRACTION, BILATERAL      HYSTERECTOMY      INCISIONAL BREAST BIOPSY       Family History   Problem Relation Age of Onset    Diabetes Mother     Varicose Veins Mother     Diabetes Brother        Objective:  /72 (BP Location: Left arm, Patient Position: Sitting, Cuff Size: Standard)   Pulse 68   Temp 98 6 °F (37 °C) (Temporal)   Ht 5' 3" (1 6 m)   Wt 58 3 kg (128 lb 9 6 oz)   SpO2 99%   BMI 22 78 kg/m²   Body mass index is 22 78 kg/m²  Physical Exam   Constitutional: She appears well-developed  HENT:   Head: Normocephalic  Right Ear: External ear normal    Left Ear: External ear normal    Mouth/Throat: Oropharynx is clear and moist    Eyes: Pupils are equal, round, and reactive to light  No scleral icterus  Neck: Normal range of motion  Neck supple  No tracheal deviation present  No thyromegaly present  Cardiovascular: Normal rate, regular rhythm and normal heart sounds  Pulmonary/Chest: Effort normal and breath sounds normal  No respiratory distress  She exhibits no tenderness  Abdominal: Soft  Bowel sounds are normal  She exhibits no mass  There is no tenderness  Musculoskeletal: Normal range of motion  Lymphadenopathy:     She has no cervical adenopathy  Neurological: She is alert  No cranial nerve deficit  Skin: Skin is warm  Psychiatric: She has a normal mood and affect

## 2021-08-02 ENCOUNTER — HOSPITAL ENCOUNTER (OUTPATIENT)
Dept: RADIOLOGY | Facility: IMAGING CENTER | Age: 86
Discharge: HOME | End: 2021-08-02
Payer: COMMERCIAL

## 2021-08-02 ENCOUNTER — OFFICE VISIT (OUTPATIENT)
Dept: URGENT CARE | Age: 86
End: 2021-08-02
Payer: COMMERCIAL

## 2021-08-02 ENCOUNTER — APPOINTMENT (OUTPATIENT)
Dept: LAB | Facility: IMAGING CENTER | Age: 86
End: 2021-08-02
Payer: COMMERCIAL

## 2021-08-02 ENCOUNTER — OFFICE VISIT (OUTPATIENT)
Dept: INTERNAL MEDICINE CLINIC | Facility: CLINIC | Age: 86
End: 2021-08-02
Payer: COMMERCIAL

## 2021-08-02 ENCOUNTER — TELEPHONE (OUTPATIENT)
Dept: INTERNAL MEDICINE CLINIC | Facility: CLINIC | Age: 86
End: 2021-08-02

## 2021-08-02 VITALS
HEIGHT: 63 IN | OXYGEN SATURATION: 98 % | TEMPERATURE: 98.7 F | WEIGHT: 129 LBS | SYSTOLIC BLOOD PRESSURE: 177 MMHG | HEART RATE: 61 BPM | DIASTOLIC BLOOD PRESSURE: 80 MMHG | BODY MASS INDEX: 22.86 KG/M2 | RESPIRATION RATE: 17 BRPM

## 2021-08-02 VITALS
RESPIRATION RATE: 18 BRPM | TEMPERATURE: 98.1 F | SYSTOLIC BLOOD PRESSURE: 140 MMHG | DIASTOLIC BLOOD PRESSURE: 64 MMHG | WEIGHT: 129 LBS | HEART RATE: 61 BPM | BODY MASS INDEX: 22.02 KG/M2 | OXYGEN SATURATION: 98 % | HEIGHT: 64 IN

## 2021-08-02 DIAGNOSIS — R29.6 FREQUENT FALLS: ICD-10-CM

## 2021-08-02 DIAGNOSIS — E78.2 MIXED HYPERLIPIDEMIA: ICD-10-CM

## 2021-08-02 DIAGNOSIS — K21.9 GASTROESOPHAGEAL REFLUX DISEASE WITHOUT ESOPHAGITIS: ICD-10-CM

## 2021-08-02 DIAGNOSIS — N18.31 STAGE 3A CHRONIC KIDNEY DISEASE (HCC): ICD-10-CM
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
## 2021-08-02 DIAGNOSIS — M81.0 AGE-RELATED OSTEOPOROSIS WITHOUT CURRENT PATHOLOGICAL FRACTURE: ICD-10-CM

## 2021-08-02 DIAGNOSIS — S82.891A CLOSED FRACTURE OF RIGHT ANKLE, INITIAL ENCOUNTER: Primary | ICD-10-CM

## 2021-08-02 DIAGNOSIS — R55 VASOVAGAL SYNCOPE: ICD-10-CM

## 2021-08-02 DIAGNOSIS — S82.891A CLOSED AVULSION FRACTURE OF RIGHT ANKLE, INITIAL ENCOUNTER: ICD-10-CM

## 2021-08-02 DIAGNOSIS — M25.571 ACUTE RIGHT ANKLE PAIN: ICD-10-CM

## 2021-08-02 DIAGNOSIS — M25.561 ACUTE PAIN OF BOTH KNEES: ICD-10-CM

## 2021-08-02 DIAGNOSIS — M25.562 ACUTE PAIN OF BOTH KNEES: ICD-10-CM

## 2021-08-02 LAB
ALBUMIN SERPL BCP-MCNC: 3.7 G/DL (ref 3.5–5)
ALP SERPL-CCNC: 99 U/L (ref 46–116)
ALT SERPL W P-5'-P-CCNC: 18 U/L (ref 12–78)
ANION GAP SERPL CALCULATED.3IONS-SCNC: 3 MMOL/L (ref 4–13)
AST SERPL W P-5'-P-CCNC: 17 U/L (ref 5–45)
BASOPHILS # BLD AUTO: 0.07 THOUSANDS/ΜL (ref 0–0.1)
BASOPHILS NFR BLD AUTO: 1 % (ref 0–1)
BILIRUB SERPL-MCNC: 0.71 MG/DL (ref 0.2–1)
BUN SERPL-MCNC: 14 MG/DL (ref 5–25)
CALCIUM SERPL-MCNC: 9.9 MG/DL (ref 8.3–10.1)
CHLORIDE SERPL-SCNC: 105 MMOL/L (ref 100–108)
CHOLEST SERPL-MCNC: 176 MG/DL (ref 50–200)
CO2 SERPL-SCNC: 27 MMOL/L (ref 21–32)
CREAT SERPL-MCNC: 0.81 MG/DL (ref 0.6–1.3)
EOSINOPHIL # BLD AUTO: 0.07 THOUSAND/ΜL (ref 0–0.61)
EOSINOPHIL NFR BLD AUTO: 1 % (ref 0–6)
ERYTHROCYTE [DISTWIDTH] IN BLOOD BY AUTOMATED COUNT: 12.8 % (ref 11.6–15.1)
GFR SERPL CREATININE-BSD FRML MDRD: 67 ML/MIN/1.73SQ M
GLUCOSE P FAST SERPL-MCNC: 103 MG/DL (ref 65–99)
HCT VFR BLD AUTO: 42.7 % (ref 34.8–46.1)
HDLC SERPL-MCNC: 66 MG/DL
HGB BLD-MCNC: 13.1 G/DL (ref 11.5–15.4)
IMM GRANULOCYTES # BLD AUTO: 0.02 THOUSAND/UL (ref 0–0.2)
IMM GRANULOCYTES NFR BLD AUTO: 0 % (ref 0–2)
LDLC SERPL CALC-MCNC: 91 MG/DL (ref 0–100)
LYMPHOCYTES # BLD AUTO: 3.49 THOUSANDS/ΜL (ref 0.6–4.47)
LYMPHOCYTES NFR BLD AUTO: 37 % (ref 14–44)
MCH RBC QN AUTO: 27.7 PG (ref 26.8–34.3)
MCHC RBC AUTO-ENTMCNC: 30.7 G/DL (ref 31.4–37.4)
MCV RBC AUTO: 90 FL (ref 82–98)
MONOCYTES # BLD AUTO: 0.74 THOUSAND/ΜL (ref 0.17–1.22)
MONOCYTES NFR BLD AUTO: 8 % (ref 4–12)
NEUTROPHILS # BLD AUTO: 5.11 THOUSANDS/ΜL (ref 1.85–7.62)
NEUTS SEG NFR BLD AUTO: 53 % (ref 43–75)
NRBC BLD AUTO-RTO: 0 /100 WBCS
PLATELET # BLD AUTO: 243 THOUSANDS/UL (ref 149–390)
PMV BLD AUTO: 11.3 FL (ref 8.9–12.7)
POTASSIUM SERPL-SCNC: 5 MMOL/L (ref 3.5–5.3)
PROT SERPL-MCNC: 7.8 G/DL (ref 6.4–8.2)
RBC # BLD AUTO: 4.73 MILLION/UL (ref 3.81–5.12)
SODIUM SERPL-SCNC: 135 MMOL/L (ref 136–145)
TRIGL SERPL-MCNC: 93 MG/DL
WBC # BLD AUTO: 9.5 THOUSAND/UL (ref 4.31–10.16)

## 2021-08-02 PROCEDURE — 36415 COLL VENOUS BLD VENIPUNCTURE: CPT

## 2021-08-02 PROCEDURE — 29515 APPLICATION SHORT LEG SPLINT: CPT | Performed by: NURSE PRACTITIONER

## 2021-08-02 PROCEDURE — 80053 COMPREHEN METABOLIC PANEL: CPT

## 2021-08-02 PROCEDURE — 73564 X-RAY EXAM KNEE 4 OR MORE: CPT

## 2021-08-02 PROCEDURE — 80061 LIPID PANEL: CPT

## 2021-08-02 PROCEDURE — 73610 X-RAY EXAM OF ANKLE: CPT

## 2021-08-02 PROCEDURE — 99214 OFFICE O/P EST MOD 30 MIN: CPT | Performed by: NURSE PRACTITIONER

## 2021-08-02 PROCEDURE — 85025 COMPLETE CBC W/AUTO DIFF WBC: CPT

## 2021-08-02 PROCEDURE — 99213 OFFICE O/P EST LOW 20 MIN: CPT | Performed by: NURSE PRACTITIONER

## 2021-08-02 PROCEDURE — G0463 HOSPITAL OUTPT CLINIC VISIT: HCPCS | Performed by: NURSE PRACTITIONER

## 2021-08-02 NOTE — PATIENT INSTRUCTIONS
Get XRays done of knees and ankle  Get labs completed  Keep appointment with Dr Bonifacio Ortiz next week  Referred to physical therapy to help with strengthening for frequent falls  Go to the Hospital if you have more episodes of falling or passing out  Fall Prevention   AMBULATORY CARE:   Fall prevention  includes ways to make your home and other areas safer  It also includes ways you can move more carefully to prevent a fall  Health conditions that cause changes in your blood pressure, vision, or muscle strength and coordination may increase your risk for falls  Medicines may also increase your risk for falls if they make you dizzy, weak, or sleepy  Call 911 or have someone else call if:   · You have fallen and are unconscious  · You have fallen and cannot move part of your body  Contact your healthcare provider if:   · You have fallen and have pain or a headache  · You have questions or concerns about your condition or care  Fall prevention tips:   · Stand or sit up slowly  This may help you keep your balance and prevent falls  · Use assistive devices as directed  Your healthcare provider may suggest that you use a cane or walker to help you keep your balance  You may need to have grab bars put in your bathroom near the toilet or in the shower  · Wear shoes that fit well and have soles that   Wear shoes both inside and outside  Use slippers with good   Do not wear shoes with high heels  · Wear a personal alarm  This is a device that allows you to call 911 if you fall and need help  Ask your healthcare provider for more information  · Stay active  Exercise can help strengthen your muscles and improve your balance  Your healthcare provider may recommend water aerobics or walking  He or she may also recommend physical therapy to improve your coordination  Never start an exercise program without talking to your healthcare provider first          · Manage your medical conditions  Keep all appointments with your healthcare providers  Visit your eye doctor as directed  Home safety tips:       · Add items to prevent falls in the bathroom  Put nonslip strips on your bath or shower floor to prevent you from slipping  Use a bath mat if you do not have carpet in the bathroom  This will prevent you from falling when you step out of the bath or shower  Use a shower seat so you do not need to stand while you shower  Sit on the toilet or a chair in your bathroom to dry yourself and put on clothing  This will prevent you from losing your balance from drying or dressing yourself while you are standing  · Keep paths clear  Remove books, shoes, and other objects from walkways and stairs  Place cords for telephones and lamps out of the way so that you do not need to walk over them  Tape them down if you cannot move them  Remove small rugs  If you cannot remove a rug, secure it with double-sided tape  This will prevent you from tripping  · Install bright lights in your home  Use night lights to help light paths to the bathroom or kitchen  Always turn on the light before you start walking  · Keep items you use often on shelves within reach  Do not use a step stool to help you reach an item  · Paint or place reflective tape on the edges of your stairs  This will help you see the stairs better  Follow up with your healthcare provider as directed:  Write down your questions so you remember to ask them during your visits  © [employer] 2021 Information is for End User's use only and may not be sold, redistributed or otherwise used for commercial purposes  All illustrations and images included in CareNotes® are the copyrighted property of [employer]  or [facility] Robbie Ortega   The above information is an  only  It is not intended as medical advice for individual conditions or treatments   Talk to your doctor, nurse or pharmacist before following any medical regimen to see if it is safe and effective for you

## 2021-08-02 NOTE — TELEPHONE ENCOUNTER
Called ortho and they said that she will needs to go to a care now for her ankle to be stabilized and then the first avail appointment tomorrow [facility] [place] [address] [facility]  @ 545 pm  With Yifan Denis

## 2021-08-02 NOTE — PATIENT INSTRUCTIONS
You are to follow up with your doctor /orthopedist as scheduled tomorrow  Do NOT remove the splint  Do Not bathe with the splint  Do NOT walk on the splint  Take tylenol for pain  Elevate and ice the ankle     Ankle Fracture   WHAT YOU NEED TO KNOW:   An ankle fracture is a break in 1 or more of the bones in your ankle  DISCHARGE INSTRUCTIONS:   Call your local emergency number (911 in the [address] [facility]) for any of the following:   · You feel lightheaded, short of breath, and have chest pain  · You cough up blood  Return to the emergency department if:   · Your leg feels warm, tender, and painful  It may look swollen and red  · Blood soaks through your bandage  · You have severe pain in your ankle  · Your cast feels too tight  · Your foot or toes are cold or numb  · Your foot or toenails turn blue or gray  Call your doctor if:   · Your splint feels too tight  · Your swelling has increased or returned  · You have a fever  · Your pain does not go away, even after treatment  · You have questions or concerns about your condition or care  Medicines: You may need any of the following:  · Acetaminophen  decreases pain and fever  It is available without a doctor's order  Ask how much to take and how often to take it  Follow directions  Read the labels of all other medicines you are using to see if they also contain acetaminophen, or ask your doctor or pharmacist  Acetaminophen can cause liver damage if not taken correctly  Do not use more than 4 grams (4,000 milligrams) total of acetaminophen in one day  · NSAIDs , such as ibuprofen, help decrease swelling, pain, and fever  This medicine is available with or without a doctor's order  NSAIDs can cause stomach bleeding or kidney problems in certain people  If you take blood thinner medicine, always ask your healthcare provider if NSAIDs are safe for you  Always read the medicine label and follow directions      · Prescription pain medicine  may be given  Ask your healthcare provider how to take this medicine safely  Some prescription pain medicines contain acetaminophen  Do not take other medicines that contain acetaminophen without talking to your healthcare provider  Too much acetaminophen may cause liver damage  Prescription pain medicine may cause constipation  Ask your healthcare provider how to prevent or treat constipation  · Take your medicine as directed  Contact your healthcare provider if you think your medicine is not helping or if you have side effects  Tell him or her if you are allergic to any medicine  Keep a list of the medicines, vitamins, and herbs you take  Include the amounts, and when and why you take them  Bring the list or the pill bottles to follow-up visits  Carry your medicine list with you in case of an emergency  Follow up with your doctor in 1 to 2 days: Your fracture may need to be reduced (bones pushed back into place) or you may need surgery  Write down your questions so you remember to ask them during your visits  Support devices: You will be given a brace, cast, or splint to limit your movement and protect your ankle  You may need to use crutches to protect your ankle and decrease your pain as you move around  Do not remove your device and do not put weight on your injured ankle  Splint and cast care:  Cover the splint or cast before you bathe so it does not get wet  Tape 2 plastic trash bags to your skin above the cast  Try to keep your ankle out of the water as much as possible  Rest:  Rest your ankle so that it can heal  Return to normal activities as directed  Ice:  Apply ice on your ankle for 15 to 20 minutes every hour or as directed  Use an ice pack, or put crushed ice in a plastic bag  Cover it with a towel  Ice helps prevent tissue damage and decreases swelling and pain  Elevate:  Elevate your ankle above the level of your heart as often as you can   This will help decrease swelling and pain  Prop your ankle on pillows or blankets to keep it elevated comfortably  © Copyright [employer] 2021 Information is for End User's use only and may not be sold, redistributed or otherwise used for commercial purposes  All illustrations and images included in CareNotes® are the copyrighted property of [employer]  or Donald Shukla  The above information is an  only  It is not intended as medical advice for individual conditions or treatments  Talk to your doctor, nurse or pharmacist before following any medical regimen to see if it is safe and effective for you

## 2021-08-02 NOTE — PROGRESS NOTES
[facility]        NAME: Morgan Stovall is a 81 y o  female  : 1935    MRN: [id number]  DATE: 2021  TIME: 4:50 PM    Assessment and Plan   Closed fracture of right ankle, initial encounter [O54 480E]  1  Closed fracture of right ankle, initial encounter     2  Fall, initial encounter           Patient Instructions       Follow up with PCP in 3-5 days  Proceed to  ER if symptoms worsen  You are to follow up with your doctor /orthopedist as scheduled tomorrow  Do NOT remove the splint  Do Not bathe with the splint  Do NOT walk on the splint  Take tylenol for pain  Elevate and ice the ankle         Chief Complaint     Chief Complaint   Patient presents with    Ankle Pain     R ankle/ft soreness  Fell Sat onto wood floor  Lateral ankle & ant foot below great toe  Slight swelling         History of Present Illness       81year old female who was sent to care now from PCP after speaking with ortho for splint due to ankle and heel fracture  She states that she was outside in the garden and fell on a hill then went inside and sat down and fell out of the chair and twisted her right ankle  Review of Systems   Review of Systems   Constitutional: Negative  HENT: Negative  Eyes: Negative  Respiratory: Negative  Cardiovascular: Negative  Gastrointestinal: Negative  Endocrine: Negative  Genitourinary: Negative  Musculoskeletal:        Right ankle fracture and heel fracture    Skin: Negative  Allergic/Immunologic: Negative  Neurological: Negative  Hematological: Negative  Psychiatric/Behavioral: Negative            Current Medications       Current Outpatient Medications:     acetaminophen (TYLENOL) 500 mg tablet, Take 1 tablet (500 mg total) by mouth every 4 (four) hours as needed (knee and back pain), Disp: 90 tablet, Rfl: 1    amLODIPine (NORVASC) 5 mg tablet, Take 1 tablet (5 mg total) by mouth daily, Disp: 90 tablet, Rfl: 1    atorvastatin (LIPITOR) 20 mg tablet, Take 1 tablet (20 mg total) by mouth daily, Disp: 90 tablet, Rfl: 1    Cholecalciferol (VITAMIN D3 PO), Take 1 capsule by mouth daily 1000, Disp: , Rfl:     Cranberry-Vitamin C-Probiotic (AZO CRANBERRY PO), Take by mouth daily, Disp: , Rfl:     LORazepam (ATIVAN) 0 5 mg tablet, Take 1 tablet (0 5 mg total) by mouth as needed (help sleep), Disp: 30 tablet, Rfl: 0    Multiple Vitamins-Minerals (PRESERVISION AREDS 2) CAPS, Take 1 capsule by mouth daily, Disp: 90 capsule, Rfl: 1    omeprazole (PriLOSEC) 20 mg delayed release capsule, Take 1 capsule (20 mg total) by mouth daily, Disp: 90 capsule, Rfl: 1    Diclofenac Sodium (VOLTAREN) 1 %, Apply 2 g topically 4 (four) times a day (Patient taking differently: Apply 2 g topically as needed ), Disp: 1 Tube, Rfl: 2    Current Allergies     Allergies as of 08/02/2021 - Reviewed 08/02/2021   Allergen Reaction Noted    Daypro [oxaprozin]  01/19/2017    Minocycline Other (See Comments) 10/08/2014            The following portions of the patient's history were reviewed and updated as appropriate: allergies, current medications, past family history, past medical history, past social history, past surgical history and problem list      Past Medical History:   Diagnosis Date    Anxiety     Last Assessed:11/24/2014    Appendicitis     Asymptomatic varicose veins     Last Assessed:10/8/2014    Esophageal reflux     Last Assessed:11/3/2014    Fracture of rib     Last Assessed:8/7/2015    Insomnia     Last Assessed:3/18/2014    Macular degeneration     Syncope     pt reports recent syncope    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)     Last Assessed:3/18/2014       Past Surgical History:   Procedure Laterality Date    APPENDECTOMY      CATARACT EXTRACTION, BILATERAL      HYSTERECTOMY      INCISIONAL BREAST BIOPSY         Family History   Problem Relation Age of Onset    Diabetes Mother     Varicose Veins Mother  Diabetes Brother          Medications have been verified  Objective   BP (!) 177/80   Pulse 61   Temp 98 7 °F (37 1 °C)   Resp 17   Ht 5' 3" (1 6 m)   Wt 58 5 kg (129 lb)   SpO2 98%   BMI 22 85 kg/m²   No LMP recorded  Patient is postmenopausal        Physical Exam     Physical Exam  Vitals and nursing note reviewed  Constitutional:       General: She is not in acute distress  Appearance: Normal appearance  She is normal weight  She is not ill-appearing, toxic-appearing or diaphoretic  HENT:      Head: Normocephalic and atraumatic  Eyes:      Extraocular Movements: Extraocular movements intact  Cardiovascular:      Rate and Rhythm: Normal rate  Pulses: Normal pulses  Pulmonary:      Effort: Pulmonary effort is normal    Musculoskeletal:         General: Swelling, tenderness and signs of injury present  Normal range of motion  Cervical back: Normal range of motion  Comments: Pt is walking on right leg  She has slight limp  Slight swelling to ankle   + pedal pulse able to wiggle toes    Skin:     General: Skin is warm and dry  Capillary Refill: Capillary refill takes less than 2 seconds  Neurological:      General: No focal deficit present  Mental Status: She is alert and oriented to person, place, and time  Psychiatric:         Mood and Affect: Mood normal          Behavior: Behavior normal          Thought Content: Thought content normal          Judgment: Judgment normal                RIGHT ANKLE     INDICATION:   [id number]: Pain in right ankle and joints of right foot      COMPARISON:  None     VIEWS:  XR ANKLE 3+ VW RIGHT   Images: 3     FINDINGS:     Apparent nondisplaced cortical avulsion of the lateral calcaneus seen to best advantage on the AP view  A tiny avulsion of the tip of the fibula may also be present    No ankle mortise widening or other fractures noted      No significant degenerative changes      No lytic or blastic osseous lesion      There is soft tissue swelling over the lateral malleolus      IMPRESSION:     Nondisplaced cortical avulsion of the lateral calcaneus  Possible nondisplaced cortical avulsion of the tip of the fibula            Workstation performed: [id number]          Orthopedic injury treatment    Date/Time: 8/2/2021 4:43 PM  Performed by: SOPHIE Ortiz  Authorized by: SOPHIE Ortiz     Patient Location:  Clinic  Other Assisting Provider: Yes (comment) (Sana ETIENNE )    Verbal consent obtained?: Yes    Risks and benefits: Risks, benefits and alternatives were discussed    Consent given by:  Patient and spouse  Patient states understanding of procedure being performed: Yes    Patient's understanding of procedure matches consent: Yes    Procedure consent matches procedure scheduled: Yes    Relevant documents present and verified: Yes    Test results available and properly labeled: Yes    Site marked: Yes    Radiology Images displayed and confirmed   If images not available, report reviewed: Yes    Required items: Required blood products, implants, devices and special equipment available    Patient identity confirmed:  Verbally with patient and hospital-assigned identification number  Time out: Immediately prior to the procedure a time out was called    Injury location:  Ankle  Location details:  Right ankle  Injury type:  Fracture  Neurovascular status: Neurovascularly intact    Distal perfusion: normal    Neurological function: normal    Range of motion: normal    Immobilization:  Crutches  Splint type:  Short leg  Supplies used:  Cotton padding, Ortho-Glass and elastic bandage  Neurovascular status: Neurovascularly intact    Distal perfusion: normal    Neurological function: normal    Range of motion comment:  Reduced by splint   Patient tolerance:  Patient tolerated the procedure well with no immediate complications

## 2021-08-02 NOTE — TELEPHONE ENCOUNTER
She can take tylenol and keep using the voltaren gel     Further treatment really depends on what the results show

## 2021-08-02 NOTE — PROGRESS NOTES
Assessment/Plan:       Problem List Items Addressed This Visit        Cardiovascular and Mediastinum    Vasovagal syncope     Patients falls are likely r/t this  EKG in office unremarkable  Will get CBC, CMP  Reviewed red flag signs to call the office with or go to the Emergency Department with  Patient verbalizes understanding  Musculoskeletal and Integument    Age-related osteoporosis without current pathological fracture     Hx frequent falls  Will get aldair knee xr, right ankle XR            Other    Frequent falls     Will get Xrays, has osteoporosis  Referred to physical therapy for strengthening/strategies          Relevant Orders    CBC and differential    Comprehensive metabolic panel    Ambulatory referral to Physical Therapy    Acute right ankle pain     Tender on palpation s/p fall 2 days ago  Hx osteoporosis  Will get XR         Relevant Orders    XR ankle 3+ vw right    Ambulatory referral to Physical Therapy    Acute pain of both knees     S/p fall  Will get Xrs of both knees d/t tenderness, osteoporosis          Relevant Orders    XR knee 4+ vw left injury    XR knee 4+ vw right injury    Ambulatory referral to Physical Therapy      Other Visit Diagnoses     Fall, initial encounter    -  Primary    Relevant Orders    XR knee 4+ vw left injury    XR knee 4+ vw right injury    Ambulatory referral to Physical Therapy                 Subjective:      Patient ID: Terell Mayorga is a 81 y o  female  Patient presents for an acute visit  She reports that 2 days ago, she had 2 falls  She has bilateral knee and right foot pain  She initially fell on buttocks, picking weeds on a hill  She started to feel faint/lightheaded and she fell  She felt ill she reports  She admits it was hot and she had her head down  She denies hitting her head   Has a hx of similar in the past      Her  tried to help her in the house, she reports that when he was trying to help her sit, and she had weakness and fell forward onto her knees  She now has bilateral knee pain without wounds/bruising, and right foot pain  She denies CP, SOB, N/V, headache  Fall  The accident occurred 2 days ago  The fall occurred while walking  She fell from a height of 3 to 5 ft  She landed on hard floor  The point of impact was the left knee, right knee and right foot  The pain is present in the left knee, right knee and right foot  The pain is moderate  The symptoms are aggravated by pressure on injury and extension  Pertinent negatives include no abdominal pain, fever, headaches, hearing loss, loss of consciousness, nausea, numbness, tingling, visual change or vomiting  She has tried nothing (topical voltaren) for the symptoms  The following portions of the patient's history were reviewed and updated as appropriate: allergies, current medications, past family history, past medical history, past social history, past surgical history and problem list     Review of Systems   Constitutional: Negative for fever  Respiratory: Negative for shortness of breath  Cardiovascular: Negative for chest pain  Gastrointestinal: Negative for abdominal pain, nausea and vomiting  Genitourinary: Negative for difficulty urinating  Musculoskeletal:        Per HPI   Skin: Negative for rash and wound  Neurological: Positive for light-headedness (per HPI, resolved)  Negative for dizziness, tingling, loss of consciousness, numbness and headaches  Hematological: Does not bruise/bleed easily           Past Medical History:   Diagnosis Date    Anxiety     Last Assessed:11/24/2014    Appendicitis     Asymptomatic varicose veins     Last Assessed:10/8/2014    Esophageal reflux     Last Assessed:11/3/2014    Fracture of rib     Last Assessed:8/7/2015    Insomnia     Last Assessed:3/18/2014    Macular degeneration     Syncope     pt reports recent syncope    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 ([facility] [address] )     Last Assessed:3/18/2014         Current Outpatient Medications:     acetaminophen (TYLENOL) 500 mg tablet, Take 1 tablet (500 mg total) by mouth every 4 (four) hours as needed (knee and back pain), Disp: 90 tablet, Rfl: 1    amLODIPine (NORVASC) 5 mg tablet, Take 1 tablet (5 mg total) by mouth daily, Disp: 90 tablet, Rfl: 1    atorvastatin (LIPITOR) 20 mg tablet, Take 1 tablet (20 mg total) by mouth daily, Disp: 90 tablet, Rfl: 1    Cholecalciferol (VITAMIN D3 PO), Take 1 capsule by mouth daily 1000, Disp: , Rfl:     Cranberry-Vitamin C-Probiotic (AZO CRANBERRY PO), Take by mouth daily, Disp: , Rfl:     Diclofenac Sodium (VOLTAREN) 1 %, Apply 2 g topically 4 (four) times a day (Patient taking differently: Apply 2 g topically as needed ), Disp: 1 Tube, Rfl: 2    LORazepam (ATIVAN) 0 5 mg tablet, Take 1 tablet (0 5 mg total) by mouth as needed (help sleep), Disp: 30 tablet, Rfl: 0    Multiple Vitamins-Minerals (PRESERVISION AREDS 2) CAPS, Take 1 capsule by mouth daily, Disp: 90 capsule, Rfl: 1    omeprazole (PriLOSEC) 20 mg delayed release capsule, Take 1 capsule (20 mg total) by mouth daily, Disp: 90 capsule, Rfl: 1    Allergies   Allergen Reactions    Daypro [Oxaprozin]      Unknown allergic reaction    Minocycline Other (See Comments)     Loss of taste       Social History   Past Surgical History:   Procedure Laterality Date    APPENDECTOMY      CATARACT EXTRACTION, BILATERAL      HYSTERECTOMY      INCISIONAL BREAST BIOPSY       Family History   Problem Relation Age of Onset    Diabetes Mother     Varicose Veins Mother     Diabetes Brother        Objective:  /64 (BP Location: Left arm, Patient Position: Sitting, Cuff Size: Standard)   Pulse 61   Temp 98 1 °F (36 7 °C) (Temporal)   Resp 18   Ht 5' 3 5" (1 613 m)   Wt 58 5 kg (129 lb)   SpO2 98%   BMI 22 49 kg/m²        Physical Exam  Constitutional:       General: She is not in acute distress  Appearance: She is well-developed     HENT: Head: Normocephalic and atraumatic  Right Ear: External ear normal       Left Ear: External ear normal    Eyes:      General: No scleral icterus  Pupils: Pupils are equal, round, and reactive to light  Cardiovascular:      Rate and Rhythm: Normal rate and regular rhythm  Pulmonary:      Effort: Pulmonary effort is normal       Breath sounds: Normal breath sounds  Abdominal:      Palpations: Abdomen is soft  Musculoskeletal:      Cervical back: Normal range of motion and neck supple  Right knee: No swelling, deformity, erythema or bony tenderness  Decreased range of motion  No tenderness  Left knee: No swelling, deformity, erythema or bony tenderness  Decreased range of motion  No tenderness  Right ankle: Swelling present  Tenderness present over the lateral malleolus  Decreased range of motion  Normal pulse  Left ankle: Normal pulse  Lymphadenopathy:      Cervical: No cervical adenopathy  Skin:     General: Skin is warm  Neurological:      Mental Status: She is alert and oriented to person, place, and time     Psychiatric:         Behavior: Behavior normal

## 2021-08-02 NOTE — TELEPHONE ENCOUNTER
Pt wanted to know what she can do in the mean time for pain, she has been using a gel and stocking and brace for her knee   She wants to know what you recommend

## 2021-08-02 NOTE — ASSESSMENT & PLAN NOTE
Patients falls are likely r/t this  EKG in office unremarkable  Will get CBC, CMP  Reviewed red flag signs to call the office with or go to the Emergency Department with  Patient verbalizes understanding

## 2021-08-03 ENCOUNTER — OFFICE VISIT (OUTPATIENT)
Dept: OBGYN CLINIC | Facility: HOSPITAL | Age: 86
End: 2021-08-03
Payer: COMMERCIAL

## 2021-08-03 VITALS
HEIGHT: 63 IN | DIASTOLIC BLOOD PRESSURE: 73 MMHG | BODY MASS INDEX: 22.75 KG/M2 | WEIGHT: 128.4 LBS | SYSTOLIC BLOOD PRESSURE: 162 MMHG | HEART RATE: 68 BPM

## 2021-08-03 DIAGNOSIS — S92.034A CLOSED NONDISPLACED AVULSION FRACTURE OF TUBEROSITY OF RIGHT CALCANEUS, INITIAL ENCOUNTER: ICD-10-CM

## 2021-08-03 DIAGNOSIS — S82.839A AVULSION FRACTURE OF DISTAL END OF FIBULA: Primary | ICD-10-CM

## 2021-08-03 PROCEDURE — 99203 OFFICE O/P NEW LOW 30 MIN: CPT | Performed by: PHYSICIAN ASSISTANT

## 2021-08-03 NOTE — TELEPHONE ENCOUNTER
Patient called at 6:55 p m  on Monday stating that she wanted to reschedule her Orthopedic appointment that she has for Tuesday due to her birthday and her daughter wants to take her out  She states that she feels fine and wants to reschedule to another day  I gave her the phone number since the office is not opened till tomorrow morning  Patient stated that she will call and see if the answering service will help her or she will call to reschedule the appointment tomorrow morning

## 2021-08-03 NOTE — PROGRESS NOTES
Assessment/Plan   Diagnoses and all orders for this visit:    Avulsion fracture of distal end of fibula    Closed nondisplaced avulsion fracture of tuberosity of right calcaneus, initial encounter    - CAM boot fitted and dispensed        - Wear for weightbearing activity  May go to [employer] in boot  May remove for sleeping and showering  - Start PT        - Wean out of [address] as needed  - Follow up in 2 weeks [facility] sports medicine          Subjective   Patient ID: Marian Chapman is a 81 y o  female  Vitals:    08/03/21 1337   BP: 162/73   Pulse: 76     85yo female comes in for an evaluation of her right ankle  She was injured on 7-31-21 when she fell on the floor  Xrays in urgent care demonstrated a tiny lateral cortical avulsion of the calcaneus +/- tiny avulsion of fibular tip  She was treated with a orthoglass splint, but took it off because she wanted to shower and her pain is mild  No radiating pain or numbness   Today is her birthday and her ankle feels good enough to her that she is planning on going dancing at [employer]       The following portions of the patient's history were reviewed and updated as appropriate: allergies, current medications, past family history, past medical history, past social history, past surgical history and problem list The following portions of the patient's history were reviewed and updated as appropriate: allergies, current medications, past family history, past medical history, past social history, past surgical history and problem list     Review of Systems  Ortho Exam  Past Medical History:   Diagnosis Date    Anxiety     Last Assessed:11/24/2014    Appendicitis     Asymptomatic varicose veins     Last Assessed:10/8/2014    Esophageal reflux     Last Assessed:11/3/2014    Fracture of rib     Last Assessed:8/7/2015    Insomnia     Last Assessed:3/18/2014    Macular degeneration     Syncope     pt reports recent syncope    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 ([facility] [address] )     Last Assessed:3/18/2014     Past Surgical History:   Procedure Laterality Date    APPENDECTOMY      CATARACT EXTRACTION, BILATERAL      HYSTERECTOMY      INCISIONAL BREAST BIOPSY       Family History   Problem Relation Age of Onset    Diabetes Mother     Varicose Veins Mother     Diabetes Brother      Social History     Occupational History    Not on file   Tobacco Use    Smoking status: Never Smoker    Smokeless tobacco: Never Used   Vaping Use    Vaping Use: Never used   Substance and Sexual Activity    Alcohol use: Yes     Comment: socially    Drug use: No    Sexual activity: Not Currently       Review of Systems   Constitutional: Negative  HENT: Negative  Eyes: Negative  Respiratory: Negative  Cardiovascular: Negative  Gastrointestinal: Negative  Endocrine: Negative  Genitourinary: Negative  Musculoskeletal: As below      Allergic/Immunologic: Negative  Neurological: Negative  Hematological: Negative  Psychiatric/Behavioral: Negative  Objective   Physical Exam        I have personally reviewed pertinent films in PACS and my interpretation is tiny avulsion fx off the lateral calcaneus  Tiny avulsion fx off the distal fibula         · Constitutional: Awake, Alert, Oriented  · Eyes: EOMI  · Psych: Mood and affect appropriate  · Heart: regular rate and rhythm  · Lungs: No audible wheezing  · Abdomen: soft  · Lymph: no lymphedema             right ankle:  - Appearance   Swelling: mild lateral   She does have some discoloration from varicose veins, but this is equal to the contralateral ankle  - Palpation   + tenderness distal fibula   + tenderness lateral ankle  - ROM   Dorsiflexion: 0, Plantarflexion: 30, Inversion: 5 and Eversion: 5  - Special Tests   Negative anterior drawer  - Motor   normal 5/5 in all planes    Pain with resisted eversion  - NVI distally

## 2021-08-04 ENCOUNTER — TELEPHONE (OUTPATIENT)
Dept: OBGYN CLINIC | Facility: HOSPITAL | Age: 86
End: 2021-08-04

## 2021-08-04 NOTE — TELEPHONE ENCOUNTER
Patient called to let you know she cannot wear the boot without experiencing pain    Please call her or  Willy Andrew [phone]  Thank you

## 2021-08-04 NOTE — TELEPHONE ENCOUNTER
Will verify with Drew Morales tomorrow when she returns to the office, however patient already signed for the boot and it cannot be returned   The cost will be processed through her insurance

## 2021-08-04 NOTE — TELEPHONE ENCOUNTER
Patient was seen by Juan Luis Otto yesterday, 8/03  She was given a boot yesterday for right foot, and she wants to know, how much she would have to pay for the boot? She thinks the boot is not necessary  Please call back # [phone]  Thank you

## 2021-08-05 PROCEDURE — 93000 ELECTROCARDIOGRAM COMPLETE: CPT

## 2021-08-09 ENCOUNTER — OFFICE VISIT (OUTPATIENT)
Dept: INTERNAL MEDICINE CLINIC | Facility: CLINIC | Age: 86
End: 2021-08-09
Payer: COMMERCIAL

## 2021-08-09 VITALS
WEIGHT: 128 LBS | HEIGHT: 63 IN | TEMPERATURE: 98 F | DIASTOLIC BLOOD PRESSURE: 78 MMHG | SYSTOLIC BLOOD PRESSURE: 136 MMHG | OXYGEN SATURATION: 98 % | BODY MASS INDEX: 22.68 KG/M2 | HEART RATE: 82 BPM

## 2021-08-09 DIAGNOSIS — I10 BENIGN ESSENTIAL HYPERTENSION: ICD-10-CM

## 2021-08-09 DIAGNOSIS — S92.901D CLOSED FRACTURE OF RIGHT FOOT WITH ROUTINE HEALING, SUBSEQUENT ENCOUNTER: ICD-10-CM

## 2021-08-09 DIAGNOSIS — E55.9 VITAMIN D DEFICIENCY: ICD-10-CM

## 2021-08-09 DIAGNOSIS — M81.0 AGE-RELATED OSTEOPOROSIS WITHOUT CURRENT PATHOLOGICAL FRACTURE: ICD-10-CM

## 2021-08-09 DIAGNOSIS — N18.31 STAGE 3A CHRONIC KIDNEY DISEASE (HCC): ICD-10-CM

## 2021-08-09 DIAGNOSIS — G47.00 INSOMNIA, UNSPECIFIED TYPE: ICD-10-CM

## 2021-08-09 DIAGNOSIS — I10 ESSENTIAL HYPERTENSION: ICD-10-CM

## 2021-08-09 DIAGNOSIS — K21.9 GASTROESOPHAGEAL REFLUX DISEASE WITHOUT ESOPHAGITIS: Primary | ICD-10-CM

## 2021-08-09 DIAGNOSIS — E78.2 MIXED HYPERLIPIDEMIA: ICD-10-CM

## 2021-08-09 PROBLEM — S92.901A CLOSED FRACTURE OF BONE OF RIGHT FOOT: Status: ACTIVE | Noted: 2021-08-09

## 2021-08-09 PROCEDURE — 99214 OFFICE O/P EST MOD 30 MIN: CPT | Performed by: INTERNAL MEDICINE

## 2021-08-09 RX ORDER — AMLODIPINE BESYLATE 5 MG/1
5 TABLET ORAL DAILY
Qty: 90 TABLET | Refills: 1 | Status: SHIPPED | OUTPATIENT
Start: 2021-08-09 | End: 2021-09-20 | Stop reason: SDUPTHER

## 2021-08-09 RX ORDER — ATORVASTATIN CALCIUM 20 MG/1
20 TABLET, FILM COATED ORAL DAILY
Qty: 90 TABLET | Refills: 1 | Status: SHIPPED | OUTPATIENT
Start: 2021-08-09 | End: 2021-09-20 | Stop reason: SDUPTHER

## 2021-08-09 RX ORDER — LORAZEPAM 0.5 MG/1
0.5 TABLET ORAL AS NEEDED
Qty: 30 TABLET | Refills: 0 | Status: CANCELLED | OUTPATIENT
Start: 2021-08-09

## 2021-08-09 NOTE — PROGRESS NOTES
Assessment/Plan:    1  Right foot fracture  Patient being followed by orthopedic surgeon  Pain is well controlled  2  Osteoporosis  Discussed the regarding treatment options  Prolia injections also discussed and patient would like to check with her insurance before she start treatment  Advised to continue with calcium and vitamin-D supplementation  3  Hyperlipidemia  Lipid profile is well controlled on atorvastatin 20 mg daily    4  GERD    Continue with Prilosec 20 mg daily    5  Insomnia  Doing well on p r n  Use of Ativan  Diagnoses and all orders for this visit:    Gastroesophageal reflux disease without esophagitis    Essential hypertension  -     amLODIPine (NORVASC) 5 mg tablet; Take 1 tablet (5 mg total) by mouth daily    Mixed hyperlipidemia  -     atorvastatin (LIPITOR) 20 mg tablet; Take 1 tablet (20 mg total) by mouth daily    Insomnia, unspecified type    Benign essential hypertension    Stage 3a chronic kidney disease (HCC)    Age-related osteoporosis without current pathological fracture    Vitamin D deficiency    Closed fracture of right foot with routine healing, subsequent encounter    Other orders  -     Cancel: LORazepam (ATIVAN) 0 5 mg tablet; Take 1 tablet (0 5 mg total) by mouth as needed (help sleep)               Subjective:          Patient ID: Lubna Umanzor is a 81 y o  female  Patient is here for follow-up  Last week she also have fall and found to have right foot fracture was seen by orthopedic surgeon  The following portions of the patient's history were reviewed and updated as appropriate: allergies, current medications, past family history, past medical history, past social history, past surgical history and problem list     Review of Systems   Constitutional: Negative for fatigue and fever  HENT: Negative for congestion, ear discharge, ear pain, postnasal drip, sinus pressure, sore throat, tinnitus and trouble swallowing      Eyes: Negative for discharge, itching and visual disturbance  Respiratory: Negative for cough and shortness of breath  Cardiovascular: Negative for chest pain and palpitations  Gastrointestinal: Negative for abdominal pain, diarrhea, nausea and vomiting  Endocrine: Negative for cold intolerance and polyuria  Genitourinary: Negative for difficulty urinating, dysuria and urgency  Musculoskeletal: Positive for arthralgias  Negative for neck pain  Skin: Negative for rash  Allergic/Immunologic: Negative for environmental allergies  Neurological: Negative for dizziness, weakness and headaches  Psychiatric/Behavioral: Negative for agitation and behavioral problems  The patient is not nervous/anxious            Past Medical History:   Diagnosis Date    Anxiety     Last Assessed:11/24/2014    Appendicitis     Asymptomatic varicose veins     Last Assessed:10/8/2014    Esophageal reflux     Last Assessed:11/3/2014    Fracture of rib     Last Assessed:8/7/2015    Insomnia     Last Assessed:3/18/2014    Macular degeneration     Syncope     pt reports recent syncope    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)     Last Assessed:3/18/2014         Current Outpatient Medications:     acetaminophen (TYLENOL) 500 mg tablet, Take 1 tablet (500 mg total) by mouth every 4 (four) hours as needed (knee and back pain), Disp: 90 tablet, Rfl: 1    amLODIPine (NORVASC) 5 mg tablet, Take 1 tablet (5 mg total) by mouth daily, Disp: 90 tablet, Rfl: 1    atorvastatin (LIPITOR) 20 mg tablet, Take 1 tablet (20 mg total) by mouth daily, Disp: 90 tablet, Rfl: 1    Cholecalciferol (VITAMIN D3 PO), Take 1 capsule by mouth daily 1000, Disp: , Rfl:     Cranberry-Vitamin C-Probiotic (AZO CRANBERRY PO), Take by mouth daily, Disp: , Rfl:     Diclofenac Sodium (VOLTAREN) 1 %, Apply 2 g topically 4 (four) times a day (Patient taking differently: Apply 2 g topically as needed ), Disp: 1 Tube, Rfl: 2    LORazepam (ATIVAN) 0 5 mg tablet, Take 1 tablet (0 5 mg total) by mouth as needed (help sleep), Disp: 30 tablet, Rfl: 0    Multiple Vitamins-Minerals (PRESERVISION AREDS 2) CAPS, Take 1 capsule by mouth daily, Disp: 90 capsule, Rfl: 1    omeprazole (PriLOSEC) 20 mg delayed release capsule, Take 1 capsule (20 mg total) by mouth daily, Disp: 90 capsule, Rfl: 1    Allergies   Allergen Reactions    Daypro [Oxaprozin]      Unknown allergic reaction    Minocycline Other (See Comments)     Loss of taste       Social History   Past Surgical History:   Procedure Laterality Date    APPENDECTOMY      CATARACT EXTRACTION, BILATERAL      HYSTERECTOMY      INCISIONAL BREAST BIOPSY       Family History   Problem Relation Age of Onset    Diabetes Mother     Varicose Veins Mother     Diabetes Brother        Objective:  /78 (BP Location: Left arm, Patient Position: Sitting, Cuff Size: Adult)   Pulse 82   Temp 98 °F (36 7 °C)   Ht 5' 3" (1 6 m)   Wt 58 1 kg (128 lb)   SpO2 98%   BMI 22 67 kg/m²   Body mass index is 22 67 kg/m²  Physical Exam  Constitutional:       Appearance: She is well-developed  HENT:      Head: Normocephalic  Right Ear: There is no impacted cerumen  Left Ear: There is no impacted cerumen  Mouth/Throat:      Pharynx: No posterior oropharyngeal erythema  Eyes:      General: No scleral icterus  Pupils: Pupils are equal, round, and reactive to light  Neck:      Thyroid: No thyromegaly  Trachea: No tracheal deviation  Cardiovascular:      Rate and Rhythm: Normal rate and regular rhythm  Heart sounds: Normal heart sounds  No murmur heard  Pulmonary:      Effort: Pulmonary effort is normal  No respiratory distress  Breath sounds: Normal breath sounds  Chest:      Chest wall: No tenderness  Abdominal:      General: Bowel sounds are normal       Palpations: Abdomen is soft  There is no mass  Tenderness: There is no abdominal tenderness     Musculoskeletal: General: Normal range of motion  Cervical back: Normal range of motion and neck supple  Right lower leg: No edema  Left lower leg: No edema  Lymphadenopathy:      Cervical: No cervical adenopathy  Skin:     General: Skin is warm  Neurological:      Mental Status: She is alert and oriented to person, place, and time  Cranial Nerves: No cranial nerve deficit  Psychiatric:         Mood and Affect: Mood normal          Behavior: Behavior normal          Thought Content:  Thought content normal

## 2021-08-10 ENCOUNTER — TELEPHONE (OUTPATIENT)
Dept: INTERNAL MEDICINE CLINIC | Facility: CLINIC | Age: 86
End: 2021-08-10

## 2021-08-10 NOTE — TELEPHONE ENCOUNTER
Pt wanted to see which shot you preferred the prolia or evenity  Please call her back with info  She wants to know which is better for her to get

## 2021-08-11 ENCOUNTER — TELEPHONE (OUTPATIENT)
Dept: INTERNAL MEDICINE CLINIC | Facility: CLINIC | Age: 86
End: 2021-08-11

## 2021-08-11 NOTE — TELEPHONE ENCOUNTER
Patient was calling back again regarding her bill she received for her Dexa that she had done back on 02/24/2021  Patient is not responsible for the copay,  Per Dorothy at [facility] she is sending it back to the insurance to have them review this  It is preventive and is covered at 100%  She wants them to look at it and verify why they are charging her the copay  Please put this on hold and dont bill the patient  She will get back to me at the end of the week on this matter    Ref# [id number]

## 2021-08-11 NOTE — TELEPHONE ENCOUNTER
Spoke with the patient and stated that it would cost over 200 00  The patient stated that she spoke with the insurance and they stated that it would cost $83 00 per her insurance and the [facility] would cost her nothing      I stated that I would call her insurance company and find out if the $83 00 dollars is through speciality pharmacy or if it is buy and bill through the office

## 2021-08-12 NOTE — TELEPHONE ENCOUNTER
Fallon calling, she wanted me to tell you that you should not order the prolia until she knows how much it will be, because if its to much she will just continue to take a pill daily   She wants you to call he as soon as you get a price

## 2021-08-19 ENCOUNTER — OFFICE VISIT (OUTPATIENT)
Dept: INTERNAL MEDICINE CLINIC | Facility: CLINIC | Age: 86
End: 2021-08-19
Payer: COMMERCIAL

## 2021-08-19 VITALS
DIASTOLIC BLOOD PRESSURE: 60 MMHG | HEART RATE: 71 BPM | TEMPERATURE: 99 F | SYSTOLIC BLOOD PRESSURE: 128 MMHG | OXYGEN SATURATION: 98 %

## 2021-08-19 DIAGNOSIS — R39.9 UTI SYMPTOMS: ICD-10-CM

## 2021-08-19 DIAGNOSIS — N30.01 ACUTE CYSTITIS WITH HEMATURIA: Primary | ICD-10-CM

## 2021-08-19 LAB
SL AMB  POCT GLUCOSE, UA: NEGATIVE
SL AMB LEUKOCYTE ESTERASE,UA: NORMAL
SL AMB POCT BILIRUBIN,UA: NEGATIVE
SL AMB POCT BLOOD,UA: NORMAL
SL AMB POCT CLARITY,UA: NORMAL
SL AMB POCT COLOR,UA: YELLOW
SL AMB POCT KETONES,UA: NEGATIVE
SL AMB POCT NITRITE,UA: NEGATIVE
SL AMB POCT PH,UA: 6
SL AMB POCT SPECIFIC GRAVITY,UA: 1.02
SL AMB POCT URINE PROTEIN: NORMAL
SL AMB POCT UROBILINOGEN: NORMAL

## 2021-08-19 PROCEDURE — 81003 URINALYSIS AUTO W/O SCOPE: CPT | Performed by: NURSE PRACTITIONER

## 2021-08-19 PROCEDURE — 99213 OFFICE O/P EST LOW 20 MIN: CPT | Performed by: NURSE PRACTITIONER

## 2021-08-19 PROCEDURE — 87086 URINE CULTURE/COLONY COUNT: CPT | Performed by: NURSE PRACTITIONER

## 2021-08-19 RX ORDER — CEPHALEXIN 500 MG/1
500 CAPSULE ORAL EVERY 6 HOURS SCHEDULED
Qty: 20 CAPSULE | Refills: 0 | Status: SHIPPED | OUTPATIENT
Start: 2021-08-19 | End: 2021-08-24

## 2021-08-19 RX ORDER — PHENOL 1.4 %
600 AEROSOL, SPRAY (ML) MUCOUS MEMBRANE 2 TIMES DAILY WITH MEALS
COMMUNITY

## 2021-08-19 NOTE — ASSESSMENT & PLAN NOTE
Urine dip +small blood, small leuks  symptomatic  will start keflex x 5 days  check UA w/ reflex  Advised to drink plenty of water

## 2021-08-19 NOTE — PROGRESS NOTES
Assessment/Plan:    Problem List Items Addressed This Visit        Genitourinary    Urinary tract infection with hematuria - Primary     Urine dip +small blood, small leuks  symptomatic  will start keflex x 5 days  check UA w/ reflex  Advised to drink plenty of water          Relevant Medications    cephalexin (KEFLEX) 500 mg capsule    Other Relevant Orders    UA w Reflex to Microscopic w Reflex to Culture - Clinic Collect      Other Visit Diagnoses     UTI symptoms        Relevant Orders    POCT urine dip auto non-scope (Completed)        M*Modal software was used to dictate this note  It may contain errors with dictating incorrect words or incorrect spelling  Please contact the provider directly with any questions  Subjective:      Patient ID: Cecilia Calixto is a 81 y o  female  HPI    Patient presents today with concern for UTI  Onset of symptoms was 1 day ago  Symptoms include trouble urinating and then only going a small amount, "funny feeling' in her bladder when urinating, a tingling sensation in her arms when she urinates  Symptoms are intermittent  She states when she voided in the office she had no symptoms  She is on azo daily  She denies any fever or chills  No nausea, vomiting, abdominal pain    The following portions of the patient's history were reviewed and updated as appropriate: allergies, current medications, past family history, past medical history, past social history, past surgical history and problem list     Review of Systems   Constitutional: Negative for chills and fever  Gastrointestinal: Negative for abdominal pain, nausea and vomiting  Genitourinary: Positive for difficulty urinating, frequency and urgency  Negative for dysuria and hematuria           Past Medical History:   Diagnosis Date    Anxiety     Last Assessed:11/24/2014    Appendicitis     Asymptomatic varicose veins     Last Assessed:10/8/2014    Esophageal reflux     Last Assessed:11/3/2014    Fracture of rib     Last Assessed:8/7/2015    Insomnia     Last Assessed:3/18/2014    Macular degeneration     Syncope     pt reports recent syncope    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)     Last Assessed:3/18/2014         Current Outpatient Medications:     acetaminophen (TYLENOL) 500 mg tablet, Take 1 tablet (500 mg total) by mouth every 4 (four) hours as needed (knee and back pain), Disp: 90 tablet, Rfl: 1    amLODIPine (NORVASC) 5 mg tablet, Take 1 tablet (5 mg total) by mouth daily, Disp: 90 tablet, Rfl: 1    atorvastatin (LIPITOR) 20 mg tablet, Take 1 tablet (20 mg total) by mouth daily, Disp: 90 tablet, Rfl: 1    calcium carbonate (OS-VIDA) 600 MG tablet, Take 600 mg by mouth 2 (two) times a day with meals, Disp: , Rfl:     Cranberry-Vitamin C-Probiotic (AZO CRANBERRY PO), Take by mouth daily, Disp: , Rfl:     cephalexin (KEFLEX) 500 mg capsule, Take 1 capsule (500 mg total) by mouth every 6 (six) hours for 5 days, Disp: 20 capsule, Rfl: 0    Cholecalciferol (VITAMIN D3 PO), Take 1 capsule by mouth daily 1000, Disp: , Rfl:     Diclofenac Sodium (VOLTAREN) 1 %, Apply 2 g topically 4 (four) times a day (Patient not taking: Reported on 8/19/2021), Disp: 1 Tube, Rfl: 2    LORazepam (ATIVAN) 0 5 mg tablet, Take 1 tablet (0 5 mg total) by mouth as needed (help sleep) (Patient not taking: Reported on 8/19/2021), Disp: 30 tablet, Rfl: 0    Multiple Vitamins-Minerals (PRESERVISION AREDS 2) CAPS, Take 1 capsule by mouth daily (Patient not taking: Reported on 8/19/2021), Disp: 90 capsule, Rfl: 1    omeprazole (PriLOSEC) 20 mg delayed release capsule, Take 1 capsule (20 mg total) by mouth daily, Disp: 90 capsule, Rfl: 1    Allergies   Allergen Reactions    Daypro [Oxaprozin]      Unknown allergic reaction    Minocycline Other (See Comments)     Loss of taste       Social History   Past Surgical History:   Procedure Laterality Date    APPENDECTOMY      CATARACT EXTRACTION, BILATERAL      HYSTERECTOMY      INCISIONAL BREAST BIOPSY       Family History   Problem Relation Age of Onset    Diabetes Mother     Varicose Veins Mother     Diabetes Brother        Objective:  /60 (BP Location: Right arm, Patient Position: Sitting, Cuff Size: Adult)   Pulse 71   Temp 99 °F (37 2 °C) (Tympanic)   SpO2 98% Comment: Room Air     Physical Exam  Vitals reviewed  Constitutional:       General: She is not in acute distress  Appearance: Normal appearance  She is not diaphoretic  HENT:      Head: Normocephalic and atraumatic  Eyes:      Extraocular Movements: Extraocular movements intact  Conjunctiva/sclera: Conjunctivae normal       Pupils: Pupils are equal, round, and reactive to light  Cardiovascular:      Rate and Rhythm: Normal rate and regular rhythm  Heart sounds: Normal heart sounds  Pulmonary:      Effort: Pulmonary effort is normal  No respiratory distress  Breath sounds: Normal breath sounds  No wheezing, rhonchi or rales  Abdominal:      Palpations: Abdomen is soft  Tenderness: There is no abdominal tenderness  There is no right CVA tenderness or left CVA tenderness  Neurological:      Mental Status: She is alert and oriented to person, place, and time  Mental status is at baseline     Psychiatric:         Mood and Affect: Mood normal          Behavior: Behavior normal

## 2021-08-21 LAB — BACTERIA UR CULT: NORMAL

## 2021-08-23 ENCOUNTER — OFFICE VISIT (OUTPATIENT)
Dept: OBGYN CLINIC | Facility: MEDICAL CENTER | Age: 86
End: 2021-08-23
Payer: COMMERCIAL

## 2021-08-23 VITALS
SYSTOLIC BLOOD PRESSURE: 153 MMHG | WEIGHT: 128 LBS | HEIGHT: 63 IN | BODY MASS INDEX: 22.68 KG/M2 | DIASTOLIC BLOOD PRESSURE: 76 MMHG | HEART RATE: 65 BPM

## 2021-08-23 DIAGNOSIS — S92.034D CLOSED NONDISPLACED AVULSION FRACTURE OF TUBEROSITY OF RIGHT CALCANEUS WITH ROUTINE HEALING, SUBSEQUENT ENCOUNTER: ICD-10-CM

## 2021-08-23 DIAGNOSIS — S82.839A AVULSION FRACTURE OF DISTAL END OF FIBULA: Primary | ICD-10-CM

## 2021-08-23 PROCEDURE — 99213 OFFICE O/P EST LOW 20 MIN: CPT | Performed by: EMERGENCY MEDICINE

## 2021-08-23 NOTE — PATIENT INSTRUCTIONS
Ankle Sprain   AMBULATORY CARE:   An ankle sprain  happens when 1 or more ligaments in your ankle joint stretch or tear  Ligaments are tough tissues that connect bones  Ligaments support your joints and keep your bones in place  Common symptoms include the following:   · Trouble moving your ankle or foot    · Pain when you touch or put weight on your ankle    · Bruised, swollen, or misshapen ankle  Seek care immediately if:   · You have severe pain in your ankle  · Your foot or toes are cold or numb  · Your ankle becomes more weak or unstable (wobbly)  · You are unable to put any weight on your ankle or foot  · Your swelling has increased or returned  Contact your healthcare provider if:   · Your pain does not go away, even after treatment  · You have questions or concerns about your condition or care  Treatment:   · Medicines      ¨ NSAIDs , such as ibuprofen, help decrease swelling, pain, and fever  This medicine is available with or without a doctor's order  NSAIDs can cause stomach bleeding or kidney problems in certain people  If you take blood thinner medicine, always ask your healthcare provider if NSAIDs are safe for you  Always read the medicine label and follow directions  ¨ Acetaminophen  decreases pain  It is available without a doctor's order  Ask how much to take and how often to take it  Follow directions  Acetaminophen can cause liver damage if not taken correctly  ¨ Prescription pain medicine  may be given  Ask how to take this medicine safely  · Surgery  may be needed to repair or replace a torn ligament if your sprain does not heal with other treatments  Your healthcare provider may use screws to attach the bones in your ankle together  The screws may help support your ankle and make it stable  Ask your healthcare provider for more information about surgery to treat your ankle sprain    Self care:   · Use support devices,  such as a brace, cast, or splint, may be needed to limit your movement and protect your joint  You may need to use crutches to decrease your pain as you move around  · Go to physical therapy as directed  A physical therapist teaches you exercises to help improve movement and strength, and to decrease pain  · Rest  your ankle so that it can heal  Return to normal activities as directed  · Apply ice on your ankle for 15 to 20 minutes every hour or as directed  Use an ice pack, or put crushed ice in a plastic bag  Cover it with a towel  Ice helps prevent tissue damage and decreases swelling and pain  · Compress  your ankle  Ask if you should wrap an elastic bandage around your injured ligament  An elastic bandage provides support and helps decrease swelling and movement so your joint can heal  Wear as long as directed  · Elevate  your ankle above the level of your heart as often as you can  This will help decrease swelling and pain  Prop your ankle on pillows or blankets to keep it elevated comfortably  Prevent another ankle sprain:   · Let your ankle heal   Find out how long your ligament needs to heal  Do not do any physical activity until your healthcare provider says it is okay  If you start activity too soon, you may develop a more serious injury  · Always warm up and stretch  before you exercise or play sports  · Use the right equipment  Always wear shoes that fit well and are made for the activity that you are doing  You may also need ankle supports, elbow and knee pads, or braces  Follow up with your healthcare provider as directed:  Write down your questions so you remember to ask them during your visits  © 2017 [address] [employer] Information is for End User's use only and may not be sold, redistributed or otherwise used for commercial purposes  All illustrations and images included in CareNotes® are the copyrighted property of [employer]  or Puneet Muhammad    The above information is an  only  It is not intended as medical advice for individual conditions or treatments  Talk to your doctor, nurse or pharmacist before following any medical regimen to see if it is safe and effective for you  Ankle Exercises   AMBULATORY CARE:   What you need to know about ankle exercises: Ankle exercises help strengthen your ankle and improve its function after injury  These are beginning exercises  Ask your healthcare provider if you need to see a physical therapist for more advanced exercises  · Do these exercises 3 to 5 days a week , or as directed by your healthcare provider  Ask if you should perform the exercises on each ankle  · Do the exercises in the order that your healthcare provider recommends  This will help prevent swelling, chronic pain, and reinjury  Start with range of motion exercises  Then progress to strengthening exercises, and finally to balancing exercises  · Warm up before you do ankle exercises  Walk or ride a stationary bike for 5 to 10 minutes to prepare your ankle for movement  · Stop if you feel pain  It is normal to feel some discomfort at first  Regular exercise will help decrease your discomfort over time  How to perform range of motion exercises safely:  Begin with range of motion exercises to improve flexibility  Ask your healthcare provider when you can progress to strengthening exercises  · Ankle alphabet:  Sit on a chair so that your feet do not touch the floor  Use your big toe to write each letter of the alphabet  Use only your foot and ankle, and keep your movements small  Do 2 sets  · Calf stretches:      ¨ Sitting calf stretches with a towel:  Sit on the floor with both legs out straight in front of you  Loop a towel around the ball of your injured foot  Grasp the ends of the towel and pull it toward you  Keep your leg and back straight  Do not lean forward as you pull the towel  Hold for 30 seconds  Then relax for 30 seconds  Do 2 sets of 10  ¨ Standing calf stretches:  Stand facing a wall with the foot that is not injured forward and your knee slightly bent  Keep the leg with the injured foot straight and behind you with your toes pointed in slightly  With both heels flat on the floor, press your hips forward  Do not arch your back  Hold for 30 seconds, and then relax for 30 seconds  Do 2 sets of 10  Repeat with your leg bent  Do 2 sets of 10  How to perform strengthening exercises safely:  After you can perform range of motion exercises without pain, you may begin strengthening exercises  Ask your healthcare provider when you can progress to balancing exercises  · Ankle movement in 4 directions:  Sit on the floor with your legs straight in front of you  Keep your heels on the floor for support  ¨ Dorsiflexion:  Begin with your toes pointing straight up  Pull your toes toward your body  Slowly return to the starting position  Do 3 sets of 5      ¨ Plantar flexion:  Begin with your toes pointing straight up  Push your toes away from your body  Slowly return to the starting position  Do 3 sets of 5            ¨ Inversion:  Begin with your toes pointing straight up  Push your toes inward, toward each other  Slowly return to the starting position  Do 3 sets of 5      ¨ Eversion:  Begin with your toes pointing straight up  Push your toes outward, away from each other  Slowly return to the starting position  Do 3 sets of 5          · Toe curls with a towel:  Sit on a chair so that both of your feet are flat on the floor  Place a small towel on the floor in front of your injured foot  Grab the center of the towel with your toes and curl the towel toward you  Relax and repeat  Do 1 set of 5            · [place] pick-ups:  Sit on a chair so that both of your feet are flat on the floor  Place 20 marbles on the floor in front of your injured foot  Use your toes to  one marble at a time and place it into a bowl   Repeat until you have picked up all the marbles  Do 1 set  · Heel raises:      ¨ Single leg heel raises:  Stand with your weight evenly on both feet  Hold on to a chair or a wall for balance  Lift the foot that is not injured off the floor so all your weight is placed on your injured foot  Raise the heel of your injured foot as high as you can  Slowly lower your heel to the floor  Do 1 set of 10  ¨ Double leg heel raises:  Stand with your weight evenly on both feet  Hold on to a chair or a wall for balance  Raise both of your heels as high as you can  Slowly lower your heels to the floor  Do 1 set of 10  · Heel and toe walks:      ¨ Heel walks:  Begin in a standing position  Lift your toes off the floor and walk on your heels  Keep your toes lifted as high as possible  Do 2 sets of 10  ¨ Toe walks:  Begin in a standing position  Lift your heels off the floor and walk on the balls and toes of your feet  Keep your heels lifted as high as possible  Do 2 sets of 10  How to perform a balance exercise safely:  After you can perform strengthening exercises without pain, you may do this beginning balancing exercise  Ask your healthcare provider for more advanced balance exercises  · Single leg stance:  Stand with your weight evenly on both feet, or hold on to a chair or a wall  Do not lean to the side  Lift the foot that is not injured off the floor so all your weight is placed on your injured foot  Balance on your injured foot  Ask your healthcare provider how long to hold this position  Contact your healthcare provider if:   · Your pain becomes worse  · You have new pain  · You have questions or concerns about your condition, care, or exercise program   © 2017 [address] [facility] Information is for End User's use only and may not be sold, redistributed or otherwise used for commercial purposes   All illustrations and images included in CareNotes® are the copyrighted property of [employer]  or Puneet Muhammad  The above information is an  only  It is not intended as medical advice for individual conditions or treatments  Talk to your doctor, nurse or pharmacist before following any medical regimen to see if it is safe and effective for you

## 2021-08-23 NOTE — PROGRESS NOTES
Assessment/Plan:    Diagnoses and all orders for this visit:    Avulsion fracture of distal end of fibula    Closed nondisplaced avulsion fracture of tuberosity of right calcaneus with routine healing, subsequent encounter    Patient returns with significantly improved symptoms she denies any pain or discomfort she is hoping to get out of the boot  Exam today is within normal limits we have reviewed the x-ray findings  At this point in time she may DC the boot with activity as tolerated I provided ankle exercise instructions and information for her  Return if symptoms worsen or fail to improve  Chief Complaint:     Chief Complaint   Patient presents with    Right Ankle - Pain       Subjective:   Patient ID: Kae Alexander is a 81 y o  female  DOI 7/31    87yo female comes in for an re evaluation of her right ankle  She was injured on 7-31-21 when she fell on the floor  Xrays in urgent care demonstrated a tiny lateral cortical avulsion of the calcaneus +/- tiny avulsion of fibular tip  She was treated with a orthoglass splint, but took it off because she wanted to shower and her pain is mild  No radiating pain or numbness  She was evaluated in ortho clinic provided CAM boot  Review of Systems    The following portions of the patient's chart were reviewed and updated as appropriate:      Allergie  Allergies   Allergen Reactions    Daypro [Oxaprozin]      Unknown allergic reaction    Minocycline Other (See Comments)     Loss of taste   s   Allergen Reactions    Daypro [Oxaprozin]      Unknown allergic reaction    Minocycline Other (See Comments)     Loss of taste      Diagnosis Date    Anxiety     Last Assessed:11/24/2014    Appendicitis     Asymptomatic varicose veins     Last Assessed:10/8/2014    Esophageal reflux     Last Assessed:11/3/2014    Fracture of rib     Last Assessed:8/7/2015    Insomnia     Last Assessed:3/18/2014    Macular degeneration     Syncope     pt reports recent syncope    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)     Last Assessed:3/18/2014       Past Surgical History:   Procedure Laterality Date    APPENDECTOMY      CATARACT EXTRACTION, BILATERAL      HYSTERECTOMY      INCISIONAL BREAST BIOPSY         Social History     Socioeconomic History    Marital status: /Civil Union     Spouse name: Not on file    Number of children: Not on file    Years of education: Not on file    Highest education level: Not on file   Occupational History    Not on file   Tobacco Use    Smoking status: Never Smoker    Smokeless tobacco: Never Used   Vaping Use    Vaping Use: Never used   Substance and Sexual Activity    Alcohol use: Yes     Comment: socially    Drug use: No    Sexual activity: Not Currently   Other Topics Concern    Not on file   Social History Narrative    Not on file     Social Determinants of Health     Financial Resource Strain:     Difficulty of Paying Living Expenses:    Food Insecurity:     Worried About Running Out of Food in the Last Year:     Ran Out of Food in the Last Year:    Transportation Needs:     Lack of Transportation (Medical):      Lack of Transportation (Non-Medical):    Physical Activity:     Days of Exercise per Week:     Minutes of Exercise per Session:    Stress:     Feeling of Stress :    Social Connections:     Frequency of Communication with Friends and Family:     Frequency of Social Gatherings with Friends and Family:     Attends Gnosticism Services:     Active Member of Clubs or Organizations:     Attends Club or Organization Meetings:     Marital Status:    Intimate Partner Violence:     Fear of Current or Ex-Partner:     Emotionally Abused:     Physically Abused:     Sexually Abused:        Family History   Problem Relation Age of Onset    Diabetes Mother     Varicose Veins Mother     Diabetes Brother        Medications:    Current Outpatient Medications:    acetaminophen (TYLENOL) 500 mg tablet, Take 1 tablet (500 mg total) by mouth every 4 (four) hours as needed (knee and back pain), Disp: 90 tablet, Rfl: 1    amLODIPine (NORVASC) 5 mg tablet, Take 1 tablet (5 mg total) by mouth daily, Disp: 90 tablet, Rfl: 1    atorvastatin (LIPITOR) 20 mg tablet, Take 1 tablet (20 mg total) by mouth daily, Disp: 90 tablet, Rfl: 1    calcium carbonate (OS-VIDA) 600 MG tablet, Take 600 mg by mouth 2 (two) times a day with meals, Disp: , Rfl:     cephalexin (KEFLEX) 500 mg capsule, Take 1 capsule (500 mg total) by mouth every 6 (six) hours for 5 days, Disp: 20 capsule, Rfl: 0    Cholecalciferol (VITAMIN D3 PO), Take 1 capsule by mouth daily 1000, Disp: , Rfl:     Cranberry-Vitamin C-Probiotic (AZO CRANBERRY PO), Take by mouth daily, Disp: , Rfl:     Multiple Vitamins-Minerals (PRESERVISION AREDS 2) CAPS, Take 1 capsule by mouth daily, Disp: 90 capsule, Rfl: 1    omeprazole (PriLOSEC) 20 mg delayed release capsule, Take 1 capsule (20 mg total) by mouth daily, Disp: 90 capsule, Rfl: 1    Diclofenac Sodium (VOLTAREN) 1 %, Apply 2 g topically 4 (four) times a day (Patient not taking: Reported on 8/19/2021), Disp: 1 Tube, Rfl: 2    LORazepam (ATIVAN) 0 5 mg tablet, Take 1 tablet (0 5 mg total) by mouth as needed (help sleep) (Patient not taking: Reported on 8/19/2021), Disp: 30 tablet, Rfl: 0    Patient Active Problem List   Diagnosis    Benign essential hypertension    Mixed hyperlipidemia    Vitamin D deficiency    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    Gastroesophageal reflux disease without esophagitis    Age-related cataract of both eyes    CKD (chronic kidney disease) stage 3, GFR 30-59 ml/min ([facility])    Tear of skin of heel, initial encounter    Cracked skin on feet    Hyperglycemia    Insomnia    History of basal cell carcinoma    Vasovagal syncope    Lightheadedness    Postural dizziness with presyncope    Urinary tract infection with hematuria    Intermittent diarrhea    Pain in both feet    Post-traumatic wound infection    Age-related osteoporosis without current pathological fracture    Acute pain of both knees    Acute right ankle pain    Frequent falls    Closed fracture of bone of right foot       Objective:  /76   Pulse 65   Ht 5' 3" (1 6 m)   Wt 58 1 kg (128 lb)   BMI 22 67 kg/m²     Right Ankle Exam     Tenderness   The patient is experiencing no tenderness  Range of Motion   The patient has normal right ankle ROM  Muscle Strength   The patient has normal right ankle strength  Tests   Varus tilt: negative    Other   Erythema: absent  Sensation: normal  Pulse: present     Comments: There is no tenderness to palpation of the lisfranc, and no plantar ecchymosis  There is no tenderness to palpation of the proximal and mid fibula  Strength/Myotome Testing     Right Ankle/Foot   Normal strength      Physical Exam      Neurologic Exam    Procedures    I have personally reviewed pertinent films in PACS  and I have personally reviewed the written report of the pertinent studies

## 2021-09-07 ENCOUNTER — TELEPHONE (OUTPATIENT)
Dept: INTERNAL MEDICINE CLINIC | Facility: CLINIC | Age: 86
End: 2021-09-07

## 2021-09-07 ENCOUNTER — HOSPITAL ENCOUNTER (OUTPATIENT)
Dept: RADIOLOGY | Facility: IMAGING CENTER | Age: 86
Discharge: HOME | End: 2021-09-07
Payer: COMMERCIAL

## 2021-09-07 ENCOUNTER — OFFICE VISIT (OUTPATIENT)
Dept: INTERNAL MEDICINE CLINIC | Facility: CLINIC | Age: 86
End: 2021-09-07
Payer: COMMERCIAL

## 2021-09-07 VITALS
DIASTOLIC BLOOD PRESSURE: 70 MMHG | RESPIRATION RATE: 18 BRPM | SYSTOLIC BLOOD PRESSURE: 126 MMHG | OXYGEN SATURATION: 98 % | HEIGHT: 63 IN | TEMPERATURE: 98.5 F | HEART RATE: 66 BPM | BODY MASS INDEX: 22.89 KG/M2 | WEIGHT: 129.2 LBS

## 2021-09-07 DIAGNOSIS — R10.9 LEFT FLANK PAIN: ICD-10-CM

## 2021-09-07 DIAGNOSIS — R30.0 DYSURIA: ICD-10-CM

## 2021-09-07 DIAGNOSIS — R30.0 DYSURIA: Primary | ICD-10-CM

## 2021-09-07 PROBLEM — N39.0 URINARY TRACT INFECTION WITH HEMATURIA: Status: RESOLVED | Noted: 2020-10-22 | Resolved: 2021-09-07

## 2021-09-07 PROBLEM — T14.8XXA POST-TRAUMATIC WOUND INFECTION: Status: RESOLVED | Noted: 2021-03-25 | Resolved: 2021-09-07

## 2021-09-07 PROBLEM — R29.6 FREQUENT FALLS: Status: RESOLVED | Noted: 2021-08-02 | Resolved: 2021-09-07

## 2021-09-07 PROBLEM — M25.561 ACUTE PAIN OF BOTH KNEES: Status: RESOLVED | Noted: 2021-08-02 | Resolved: 2021-09-07

## 2021-09-07 PROBLEM — R31.9 URINARY TRACT INFECTION WITH HEMATURIA: Status: RESOLVED | Noted: 2020-10-22 | Resolved: 2021-09-07

## 2021-09-07 PROBLEM — M25.562 ACUTE PAIN OF BOTH KNEES: Status: RESOLVED | Noted: 2021-08-02 | Resolved: 2021-09-07

## 2021-09-07 PROBLEM — L08.9 POST-TRAUMATIC WOUND INFECTION: Status: RESOLVED | Noted: 2021-03-25 | Resolved: 2021-09-07

## 2021-09-07 LAB
BACTERIA UR QL AUTO: ABNORMAL /HPF
BILIRUB UR QL STRIP: ABNORMAL
CLARITY UR: ABNORMAL
COLOR UR: ABNORMAL
GLUCOSE UR STRIP-MCNC: NEGATIVE MG/DL
HGB UR QL STRIP.AUTO: ABNORMAL
HYALINE CASTS #/AREA URNS LPF: ABNORMAL /LPF
KETONES UR STRIP-MCNC: ABNORMAL MG/DL
LEUKOCYTE ESTERASE UR QL STRIP: ABNORMAL
NITRITE UR QL STRIP: NEGATIVE
NON-SQ EPI CELLS URNS QL MICRO: ABNORMAL /HPF
PH UR STRIP.AUTO: 6 [PH]
PROT UR STRIP-MCNC: ABNORMAL MG/DL
RBC #/AREA URNS AUTO: ABNORMAL /HPF
SL AMB  POCT GLUCOSE, UA: NORMAL
SL AMB LEUKOCYTE ESTERASE,UA: NORMAL
SL AMB POCT BILIRUBIN,UA: NORMAL
SL AMB POCT BLOOD,UA: NORMAL
SL AMB POCT CLARITY,UA: NORMAL
SL AMB POCT COLOR,UA: NORMAL
SL AMB POCT KETONES,UA: NORMAL
SL AMB POCT NITRITE,UA: NORMAL
SL AMB POCT PH,UA: 5.5
SL AMB POCT SPECIFIC GRAVITY,UA: 1.02
SL AMB POCT URINE PROTEIN: NORMAL
SL AMB POCT UROBILINOGEN: NORMAL
SP GR UR STRIP.AUTO: 1.02 (ref 1–1.03)
UROBILINOGEN UR QL STRIP.AUTO: 1 E.U./DL
WBC #/AREA URNS AUTO: ABNORMAL /HPF

## 2021-09-07 PROCEDURE — 87086 URINE CULTURE/COLONY COUNT: CPT | Performed by: INTERNAL MEDICINE

## 2021-09-07 PROCEDURE — 99213 OFFICE O/P EST LOW 20 MIN: CPT | Performed by: INTERNAL MEDICINE

## 2021-09-07 PROCEDURE — 81003 URINALYSIS AUTO W/O SCOPE: CPT | Performed by: INTERNAL MEDICINE

## 2021-09-07 PROCEDURE — 81001 URINALYSIS AUTO W/SCOPE: CPT | Performed by: INTERNAL MEDICINE

## 2021-09-07 PROCEDURE — 87186 SC STD MICRODIL/AGAR DIL: CPT | Performed by: INTERNAL MEDICINE

## 2021-09-07 PROCEDURE — 87077 CULTURE AEROBIC IDENTIFY: CPT | Performed by: INTERNAL MEDICINE

## 2021-09-07 PROCEDURE — 76770 US EXAM ABDO BACK WALL COMP: CPT

## 2021-09-07 RX ORDER — PHENAZOPYRIDINE HYDROCHLORIDE 200 MG/1
200 TABLET, FILM COATED ORAL
Qty: 15 TABLET | Refills: 0 | Status: SHIPPED | OUTPATIENT
Start: 2021-09-07

## 2021-09-07 NOTE — ASSESSMENT & PLAN NOTE
Urine dip was negative for nitrites however had a small amount of leukocytes and large amount of blood  I am concern for interstitial cystitis verses acute nephrolithiasis  Will order stat ultrasound of the kidney and bladder to rule out nephrolithiasis in for further evaluation  Will send urine out for urinalysis with reflex to culture  Defer on antibiotics at this time  Encourage increasing oral hydration  She is to contact our office for worsening symptoms

## 2021-09-07 NOTE — PROGRESS NOTES
Assessment/Plan:    Dysuria  Urine dip was negative for nitrites however had a small amount of leukocytes and large amount of blood  I am concern for interstitial cystitis verses acute nephrolithiasis  Will order stat ultrasound of the kidney and bladder to rule out nephrolithiasis in for further evaluation  Will send urine out for urinalysis with reflex to culture  Defer on antibiotics at this time  Encourage increasing oral hydration  She is to contact our office for worsening symptoms  Diagnoses and all orders for this visit:    Dysuria  -     UA w Reflex to Microscopic w Reflex to Culture - Clinic Collect  -     Cancel: US kidney and bladder; Future  -     phenazopyridine (PYRIDIUM) 200 mg tablet; Take 1 tablet (200 mg total) by mouth 3 (three) times a day with meals as needed for bladder pain  -     US kidney and bladder; Future    Left flank pain  -     UA w Reflex to Microscopic w Reflex to Culture - Clinic Collect  -     Cancel: US kidney and bladder; Future  -     phenazopyridine (PYRIDIUM) 200 mg tablet; Take 1 tablet (200 mg total) by mouth 3 (three) times a day with meals as needed for bladder pain  -     US kidney and bladder; Future            Depression Screening and Follow-up Plan: Clincally patient does not have depression  No treatment is required  Patient advised to follow-up with PCP for further management  Subjective:      Patient ID: Kae Alexander is a 81 y o  female  Chief Complaint   Patient presents with    Urinary Tract Infection     burning and lower abdominal and back pain  pain worse when laying down  states she has had multiple UTI over last few months and shes not sure why    health maintenance     nothing due    Medication Problem     started taking calcium but once the pain started she stopped them because she wasnt sure if thats what was causing her problem       64-year-old female seen today with concern for acute cystitis     Symptoms started approximately 2 days ago to which she has been experiencing dysuria, urinary urgency, suprapubic pain, and left low back pain  She denies any hematuria  Her urine dip was positive for leukocytes (small), negative for nitrites, and large amount of blood  She has had recurrence of these symptoms over the past year (this being the 3rd)  Most recently, she was seen last month with similar symptoms and was treated with Keflex  Urine culture was negative last month and again in 10/2020  Urinary Tract Infection   This is a new problem  The current episode started in the past 7 days  The problem occurs every urination  The problem has been unchanged  The quality of the pain is described as burning  The pain is at a severity of 8/10  The pain is moderate  There has been no fever  Associated symptoms include flank pain and urgency  Pertinent negatives include no chills, hematuria, nausea or vomiting  She has tried nothing for the symptoms  The following portions of the patient's history were reviewed and updated as appropriate: allergies, current medications, past family history, past medical history, past social history, past surgical history and problem list     Review of Systems   Constitutional: Negative for activity change, appetite change, chills, diaphoresis, fatigue and fever  HENT: Negative for congestion, postnasal drip, rhinorrhea, sinus pressure, sinus pain, sneezing and sore throat  Eyes: Negative for visual disturbance  Respiratory: Negative for apnea, cough, choking, chest tightness, shortness of breath and wheezing  Cardiovascular: Negative for chest pain, palpitations and leg swelling  Gastrointestinal: Negative for abdominal distention, abdominal pain, anal bleeding, blood in stool, constipation, diarrhea, nausea and vomiting  Endocrine: Negative for cold intolerance and heat intolerance  Genitourinary: Positive for flank pain and urgency   Negative for difficulty urinating, dysuria and hematuria  Skin: Negative  Neurological: Negative for dizziness, weakness, light-headedness, numbness and headaches  Hematological: Negative for adenopathy  Psychiatric/Behavioral: Negative for agitation, sleep disturbance and suicidal ideas  All other systems reviewed and are negative          Past Medical History:   Diagnosis Date    Anxiety     Last Assessed:11/24/2014    Appendicitis     Asymptomatic varicose veins     Last Assessed:10/8/2014    Esophageal reflux     Last Assessed:11/3/2014    Fracture of rib     Last Assessed:8/7/2015    Insomnia     Last Assessed:3/18/2014    Macular degeneration     Syncope     pt reports recent syncope    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)     Last Assessed:3/18/2014         Current Outpatient Medications:     acetaminophen (TYLENOL) 500 mg tablet, Take 1 tablet (500 mg total) by mouth every 4 (four) hours as needed (knee and back pain), Disp: 90 tablet, Rfl: 1    amLODIPine (NORVASC) 5 mg tablet, Take 1 tablet (5 mg total) by mouth daily, Disp: 90 tablet, Rfl: 1    atorvastatin (LIPITOR) 20 mg tablet, Take 1 tablet (20 mg total) by mouth daily, Disp: 90 tablet, Rfl: 1    Cholecalciferol (VITAMIN D3 PO), Take 1 capsule by mouth daily 1000, Disp: , Rfl:     Cranberry-Vitamin C-Probiotic (AZO CRANBERRY PO), Take by mouth daily, Disp: , Rfl:     Multiple Vitamins-Minerals (PRESERVISION AREDS 2) CAPS, Take 1 capsule by mouth daily, Disp: 90 capsule, Rfl: 1    omeprazole (PriLOSEC) 20 mg delayed release capsule, Take 1 capsule (20 mg total) by mouth daily, Disp: 90 capsule, Rfl: 1    calcium carbonate (OS-VIAD) 600 MG tablet, Take 600 mg by mouth 2 (two) times a day with meals (Patient not taking: Reported on 9/7/2021), Disp: , Rfl:     Diclofenac Sodium (VOLTAREN) 1 %, Apply 2 g topically 4 (four) times a day (Patient not taking: Reported on 8/19/2021), Disp: 1 Tube, Rfl: 2    LORazepam (ATIVAN) 0 5 mg tablet, Take 1 tablet (0 5 mg total) by mouth as needed (help sleep) (Patient not taking: Reported on 8/19/2021), Disp: 30 tablet, Rfl: 0    phenazopyridine (PYRIDIUM) 200 mg tablet, Take 1 tablet (200 mg total) by mouth 3 (three) times a day with meals as needed for bladder pain, Disp: 15 tablet, Rfl: 0    Allergies   Allergen Reactions    Daypro [Oxaprozin]      Unknown allergic reaction    Minocycline Other (See Comments)     Loss of taste       Social History   Past Surgical History:   Procedure Laterality Date    APPENDECTOMY      CATARACT EXTRACTION, BILATERAL      HYSTERECTOMY      INCISIONAL BREAST BIOPSY       Family History   Problem Relation Age of Onset    Diabetes Mother     Varicose Veins Mother     Diabetes Brother        Objective:  /70 (BP Location: Left arm, Patient Position: Sitting, Cuff Size: Standard)   Pulse 66   Temp 98 5 °F (36 9 °C) (Temporal)   Resp 18   Ht 5' 3" (1 6 m)   Wt 58 6 kg (129 lb 3 2 oz)   SpO2 98%   BMI 22 89 kg/m²     Recent Results (from the past 1344 hour(s))   Lipid Panel with Direct LDL reflex    Collection Time: 08/02/21 11:43 AM   Result Value Ref Range    Cholesterol 176 50 - 200 mg/dL    Triglycerides 93 <=150 mg/dL    HDL, Direct 66 >=40 mg/dL    LDL Calculated 91 0 - 100 mg/dL   CBC and differential    Collection Time: 08/02/21 11:43 AM   Result Value Ref Range    WBC 9 50 4 31 - 10 16 Thousand/uL    RBC 4 73 3 81 - 5 12 Million/uL    Hemoglobin 13 1 11 5 - 15 4 g/dL    Hematocrit 42 7 34 8 - 46 1 %    MCV 90 82 - 98 fL    MCH 27 7 26 8 - 34 3 pg    MCHC 30 7 (L) 31 4 - 37 4 g/dL    RDW 12 8 11 6 - 15 1 %    MPV 11 3 8 9 - 12 7 fL    Platelets 281 876 - 912 Thousands/uL    nRBC 0 /100 WBCs    Neutrophils Relative 53 43 - 75 %    Immat GRANS % 0 0 - 2 %    Lymphocytes Relative 37 14 - 44 %    Monocytes Relative 8 4 - 12 %    Eosinophils Relative 1 0 - 6 %    Basophils Relative 1 0 - 1 %    Neutrophils Absolute 5 11 1 85 - 7 62 Thousands/µL Immature Grans Absolute 0 02 0 00 - 0 20 Thousand/uL    Lymphocytes Absolute 3 49 0 60 - 4 47 Thousands/µL    Monocytes Absolute 0 74 0 17 - 1 22 Thousand/µL    Eosinophils Absolute 0 07 0 00 - 0 61 Thousand/µL    Basophils Absolute 0 07 0 00 - 0 10 Thousands/µL   Comprehensive metabolic panel    Collection Time: 08/02/21 11:43 AM   Result Value Ref Range    Sodium 135 (L) 136 - 145 mmol/L    Potassium 5 0 3 5 - 5 3 mmol/L    Chloride 105 100 - 108 mmol/L    CO2 27 21 - 32 mmol/L    ANION GAP 3 (L) 4 - 13 mmol/L    BUN 14 5 - 25 mg/dL    Creatinine 0 81 0 60 - 1 30 mg/dL    Glucose, Fasting 103 (H) 65 - 99 mg/dL    Calcium 9 9 8 3 - 10 1 mg/dL    AST 17 5 - 45 U/L    ALT 18 12 - 78 U/L    Alkaline Phosphatase 99 46 - 116 U/L    Total Protein 7 8 6 4 - 8 2 g/dL    Albumin 3 7 3 5 - 5 0 g/dL    Total Bilirubin 0 71 0 20 - 1 00 mg/dL    eGFR 67 ml/min/1 73sq m   POCT urine dip auto non-scope    Collection Time: 08/19/21  2:30 PM   Result Value Ref Range     COLOR,UA yellow     CLARITY,UA hazy     SPECIFIC GRAVITY,UA 1 025      PH,UA 6 0     LEUKOCYTE ESTERASE,UA small     NITRITE,UA negative     GLUCOSE, UA negative     KETONES,UA negative     BILIRUBIN,UA negative     BLOOD,UA small     POCT URINE PROTEIN 100 mg/dl     SL AMB POCT UROBILINOGEN 0 2 EU/dl    Urine culture    Collection Time: 08/19/21  5:17 PM    Specimen: Urine   Result Value Ref Range    Urine Culture 30,000-39,000 cfu/ml              Physical Exam  Vitals and nursing note reviewed  Constitutional:       General: She is not in acute distress  Appearance: She is well-developed  She is not diaphoretic  HENT:      Head: Normocephalic and atraumatic  Eyes:      General:         Right eye: No discharge  Left eye: No discharge  Conjunctiva/sclera: Conjunctivae normal       Pupils: Pupils are equal, round, and reactive to light  Neck:      Thyroid: No thyromegaly  Vascular: No JVD     Cardiovascular:      Rate and Rhythm: Normal rate and regular rhythm  Heart sounds: Normal heart sounds  No murmur heard  No friction rub  No gallop  Pulmonary:      Effort: Pulmonary effort is normal  No respiratory distress  Breath sounds: Normal breath sounds  No wheezing or rales  Chest:      Chest wall: No tenderness  Abdominal:      General: There is no distension  Palpations: Abdomen is soft  Tenderness: There is no abdominal tenderness  Musculoskeletal:         General: No tenderness or deformity  Normal range of motion  Cervical back: Normal range of motion and neck supple  Comments: Negative CVA tenderness  Lymphadenopathy:      Cervical: No cervical adenopathy  Skin:     General: Skin is warm and dry  Coloration: Skin is not pale  Findings: No erythema or rash  Neurological:      Mental Status: She is alert and oriented to person, place, and time  Cranial Nerves: No cranial nerve deficit  Coordination: Coordination normal    Psychiatric:         Behavior: Behavior normal          Thought Content:  Thought content normal          Judgment: Judgment normal

## 2021-09-07 NOTE — TELEPHONE ENCOUNTER
Contacted patient regarding ultrasound findings to which are consistent with bilateral renal angiomyolipomas,  Left greater than right  I recommended continuing Pyridium and increasing fluid hydration  Will follow-up urinalysis with microscopy and reflex to culture  She has a follow-up appointment with her PCP later this month

## 2021-09-08 ENCOUNTER — TELEPHONE (OUTPATIENT)
Dept: INTERNAL MEDICINE CLINIC | Facility: CLINIC | Age: 86
End: 2021-09-08

## 2021-09-08 ENCOUNTER — TELEPHONE (OUTPATIENT)
Dept: UROLOGY | Facility: AMBULATORY SURGERY CENTER | Age: 86
End: 2021-09-08

## 2021-09-08 DIAGNOSIS — D17.71 RENAL ANGIOMYOLIPOMA: Primary | ICD-10-CM

## 2021-09-08 NOTE — TELEPHONE ENCOUNTER
Francie Rivers calling very upset about the results of her US of her kidney and bladder she had done yesterday  She stated she was told she has masses on her kidney's and she doesn't know what to do, she wants to know if she should go right to a urologist and if so she wants to now who do you recommend

## 2021-09-08 NOTE — TELEPHONE ENCOUNTER
patient had called asking for a recommendation on which urologists is reccommended   These are two that her insurance covers,dr bailey in [place] and dr Sandy English [facility] in [place]    Please call her back at [phone]-

## 2021-09-08 NOTE — TELEPHONE ENCOUNTER
Patient calling back to an appointment to say she prefers [facility] location  She is concerned with Mass on both kidney's

## 2021-09-08 NOTE — TELEPHONE ENCOUNTER
Please Triage [facility]   New Patient-     What is the reason for the patients appointment? US showed masses on both kidney, patient is experiencing burning when urinating pt is taking AZO but not helping       Imaging/Lab Results: US (9/7)      Do we accept the patient's insurance or is the patient Self-Pay? Provider & Plan: [facility]  Member ID#: [id number]  Pt will contact her insurance      Has the patient had any previous urologist(s)? Yes 2012 [facility]       Have patient records been requested? Epic        Has the patient had any outside testing done?  EPIC      Does the patient have a personal history of cancer? no      Patient can be reached at : [phone]

## 2021-09-08 NOTE — TELEPHONE ENCOUNTER
Patient notified and I gave her the number and 's name to call for an appt  She was just concerned and really didn't want to drive into [place]   I told her to see if they had another office that they could schedule her at

## 2021-09-08 NOTE — TELEPHONE ENCOUNTER
Contacted new patient at [phone]  In an attempt to scheduled patient with a new patient appointment, she has declined seeing an advanced practitioner at all  Pt has decied to speak with her family further and will call back should she decied to [facility] urology  Please be advised   Thank you

## 2021-09-09 ENCOUNTER — TELEPHONE (OUTPATIENT)
Dept: INTERNAL MEDICINE CLINIC | Facility: CLINIC | Age: 86
End: 2021-09-09

## 2021-09-09 LAB — BACTERIA UR CULT: ABNORMAL

## 2021-09-09 NOTE — TELEPHONE ENCOUNTER
Contacted patient to inform her that her urine culture was positive for Gram-negative bacteria  Will start her on Keflex 500 mg twice a day for 7 days

## 2021-09-20 ENCOUNTER — OFFICE VISIT (OUTPATIENT)
Dept: INTERNAL MEDICINE CLINIC | Facility: CLINIC | Age: 86
End: 2021-09-20
Payer: COMMERCIAL

## 2021-09-20 VITALS
WEIGHT: 129.2 LBS | BODY MASS INDEX: 22.89 KG/M2 | HEIGHT: 63 IN | TEMPERATURE: 98.6 F | DIASTOLIC BLOOD PRESSURE: 68 MMHG | HEART RATE: 67 BPM | OXYGEN SATURATION: 99 % | SYSTOLIC BLOOD PRESSURE: 130 MMHG

## 2021-09-20 DIAGNOSIS — G47.00 INSOMNIA, UNSPECIFIED TYPE: ICD-10-CM

## 2021-09-20 DIAGNOSIS — I10 ESSENTIAL HYPERTENSION: ICD-10-CM

## 2021-09-20 DIAGNOSIS — E55.9 VITAMIN D DEFICIENCY: ICD-10-CM

## 2021-09-20 DIAGNOSIS — N39.0 RECURRENT UTI: ICD-10-CM

## 2021-09-20 DIAGNOSIS — D17.71 RENAL ANGIOMYOLIPOMA: ICD-10-CM

## 2021-09-20 DIAGNOSIS — M81.0 AGE-RELATED OSTEOPOROSIS WITHOUT CURRENT PATHOLOGICAL FRACTURE: ICD-10-CM

## 2021-09-20 DIAGNOSIS — E78.2 MIXED HYPERLIPIDEMIA: ICD-10-CM

## 2021-09-20 DIAGNOSIS — I10 BENIGN ESSENTIAL HYPERTENSION: ICD-10-CM

## 2021-09-20 DIAGNOSIS — K21.9 GASTROESOPHAGEAL REFLUX DISEASE WITHOUT ESOPHAGITIS: Primary | ICD-10-CM

## 2021-09-20 PROCEDURE — 99214 OFFICE O/P EST MOD 30 MIN: CPT | Performed by: INTERNAL MEDICINE

## 2021-09-20 RX ORDER — RISEDRONATE SODIUM 35 MG/1
35 TABLET, FILM COATED ORAL
Qty: 12 TABLET | Refills: 2 | Status: SHIPPED | OUTPATIENT
Start: 2021-09-20 | End: 2021-11-22 | Stop reason: SDUPTHER

## 2021-09-20 RX ORDER — OMEPRAZOLE 20 MG/1
20 CAPSULE, DELAYED RELEASE ORAL DAILY
Qty: 90 CAPSULE | Refills: 1 | Status: SHIPPED | OUTPATIENT
Start: 2021-09-20 | End: 2022-01-07 | Stop reason: SDUPTHER

## 2021-09-20 RX ORDER — ATORVASTATIN CALCIUM 20 MG/1
20 TABLET, FILM COATED ORAL DAILY
Qty: 90 TABLET | Refills: 1 | Status: SHIPPED | OUTPATIENT
Start: 2021-09-20 | End: 2022-01-07 | Stop reason: SDUPTHER

## 2021-09-20 RX ORDER — LORAZEPAM 0.5 MG/1
0.5 TABLET ORAL AS NEEDED
Qty: 30 TABLET | Refills: 0 | Status: SHIPPED | OUTPATIENT
Start: 2021-09-20 | End: 2021-11-02 | Stop reason: SDUPTHER

## 2021-09-20 RX ORDER — AMLODIPINE BESYLATE 5 MG/1
5 TABLET ORAL DAILY
Qty: 90 TABLET | Refills: 1 | Status: SHIPPED | OUTPATIENT
Start: 2021-09-20 | End: 2022-01-07 | Stop reason: SDUPTHER

## 2021-09-20 NOTE — TELEPHONE ENCOUNTER
Patient & pcp office called in to schedule   Patient scheduled with Dr Valentin Parker office 10/28/21

## 2021-09-20 NOTE — PROGRESS NOTES
Assessment/Plan:    1  Recurrent UTI/renal angiomyolipoma  Will refer patient to urologist for further evaluation and recommendation  2  Osteoporosis  Treatment options discussed including Prolia injections and bisphosphonates  She has selected to try oral bisphosphonates for now  Will start patient on Actonel 35 mg p o  Weekly  Continue with calcium and vitamin-D supplementation  Continue with weight-bearing exercises    3  GERD  Continue with Prilosec 20 mg daily    4  Insomnia  Doing well on p r n  Use of lorazepam 0 5 mg         Diagnoses and all orders for this visit:    Gastroesophageal reflux disease without esophagitis  -     omeprazole (PriLOSEC) 20 mg delayed release capsule; Take 1 capsule (20 mg total) by mouth daily  -     CBC; Future  -     Basic metabolic panel; Future    Benign essential hypertension  -     CBC; Future  -     Basic metabolic panel; Future    Vitamin D deficiency    Age-related osteoporosis without current pathological fracture  -     risedronate (ACTONEL) 35 mg tablet; Take 1 tablet (35 mg total) by mouth every 7 days with water on empty stomach, nothing by mouth or lie down for next 30 minutes  Renal angiomyolipoma  -     Ambulatory referral to Urology; Future    Recurrent UTI  -     Ambulatory referral to Urology; Future    Essential hypertension  -     amLODIPine (NORVASC) 5 mg tablet; Take 1 tablet (5 mg total) by mouth daily    Mixed hyperlipidemia  -     atorvastatin (LIPITOR) 20 mg tablet; Take 1 tablet (20 mg total) by mouth daily               Subjective:          Patient ID: Cecilia Calixto is a 81 y o  female  Patient is here for follow-up  Also want to discuss recurrent UTI and renal masses        The following portions of the patient's history were reviewed and updated as appropriate: allergies, current medications, past family history, past medical history, past social history, past surgical history and problem list     Review of Systems   Constitutional: Negative for fatigue and fever  HENT: Negative for congestion, ear discharge, ear pain, postnasal drip, sinus pressure, sore throat, tinnitus and trouble swallowing  Eyes: Negative for discharge, itching and visual disturbance  Respiratory: Negative for cough and shortness of breath  Cardiovascular: Negative for chest pain and palpitations  Gastrointestinal: Negative for abdominal pain, diarrhea, nausea and vomiting  Endocrine: Negative for cold intolerance and polyuria  Genitourinary: Positive for frequency  Negative for difficulty urinating, dysuria and urgency  Musculoskeletal: Negative for arthralgias and neck pain  Skin: Negative for rash  Allergic/Immunologic: Negative for environmental allergies  Neurological: Negative for dizziness, weakness and headaches  Psychiatric/Behavioral: Negative for agitation, behavioral problems and confusion  The patient is not nervous/anxious            Past Medical History:   Diagnosis Date    Anxiety     Last Assessed:11/24/2014    Appendicitis     Asymptomatic varicose veins     Last Assessed:10/8/2014    Esophageal reflux     Last Assessed:11/3/2014    Fracture of rib     Last Assessed:8/7/2015    Insomnia     Last Assessed:3/18/2014    Macular degeneration     Syncope     pt reports recent syncope    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)     Last Assessed:3/18/2014         Current Outpatient Medications:     acetaminophen (TYLENOL) 500 mg tablet, Take 1 tablet (500 mg total) by mouth every 4 (four) hours as needed (knee and back pain), Disp: 90 tablet, Rfl: 1    amLODIPine (NORVASC) 5 mg tablet, Take 1 tablet (5 mg total) by mouth daily, Disp: 90 tablet, Rfl: 1    atorvastatin (LIPITOR) 20 mg tablet, Take 1 tablet (20 mg total) by mouth daily, Disp: 90 tablet, Rfl: 1    calcium carbonate (OS-VIDA) 600 MG tablet, Take 600 mg by mouth 2 (two) times a day with meals , Disp: , Rfl:     Cholecalciferol (VITAMIN D3 PO), Take 1 capsule by mouth daily 1000, Disp: , Rfl:     Cranberry-Vitamin C-Probiotic (AZO CRANBERRY PO), Take by mouth daily, Disp: , Rfl:     Diclofenac Sodium (VOLTAREN) 1 %, Apply 2 g topically 4 (four) times a day, Disp: 1 Tube, Rfl: 2    LORazepam (ATIVAN) 0 5 mg tablet, Take 1 tablet (0 5 mg total) by mouth as needed (help sleep), Disp: 30 tablet, Rfl: 0    Multiple Vitamins-Minerals (PRESERVISION AREDS 2) CAPS, Take 1 capsule by mouth daily, Disp: 90 capsule, Rfl: 1    omeprazole (PriLOSEC) 20 mg delayed release capsule, Take 1 capsule (20 mg total) by mouth daily, Disp: 90 capsule, Rfl: 1    phenazopyridine (PYRIDIUM) 200 mg tablet, Take 1 tablet (200 mg total) by mouth 3 (three) times a day with meals as needed for bladder pain (Patient not taking: Reported on 9/20/2021), Disp: 15 tablet, Rfl: 0    risedronate (ACTONEL) 35 mg tablet, Take 1 tablet (35 mg total) by mouth every 7 days with water on empty stomach, nothing by mouth or lie down for next 30 minutes  , Disp: 12 tablet, Rfl: 2    Allergies   Allergen Reactions    Daypro [Oxaprozin]      Unknown allergic reaction    Minocycline Other (See Comments)     Loss of taste       Social History   Past Surgical History:   Procedure Laterality Date    APPENDECTOMY      CATARACT EXTRACTION, BILATERAL      HYSTERECTOMY      INCISIONAL BREAST BIOPSY       Family History   Problem Relation Age of Onset    Diabetes Mother     Varicose Veins Mother     Diabetes Brother        Objective:  /68 (BP Location: Left arm, Patient Position: Sitting, Cuff Size: Standard)   Pulse 67   Temp 98 6 °F (37 °C) (Temporal)   Ht 5' 3" (1 6 m)   Wt 58 6 kg (129 lb 3 2 oz)   SpO2 99%   BMI 22 89 kg/m²   Body mass index is 22 89 kg/m²  Physical Exam  Constitutional:       Appearance: She is well-developed  HENT:      Head: Normocephalic        Right Ear: Ear canal and external ear normal       Left Ear: Ear canal and external ear normal  Eyes:      General: No scleral icterus  Pupils: Pupils are equal, round, and reactive to light  Neck:      Thyroid: No thyromegaly  Trachea: No tracheal deviation  Cardiovascular:      Rate and Rhythm: Normal rate and regular rhythm  Heart sounds: Normal heart sounds  No murmur heard  Pulmonary:      Effort: Pulmonary effort is normal  No respiratory distress  Breath sounds: Normal breath sounds  Chest:      Chest wall: No tenderness  Abdominal:      General: Bowel sounds are normal       Palpations: Abdomen is soft  There is no mass  Tenderness: There is no abdominal tenderness  There is no right CVA tenderness or left CVA tenderness  Musculoskeletal:         General: Normal range of motion  Cervical back: Normal range of motion and neck supple  Right lower leg: No edema  Left lower leg: No edema  Lymphadenopathy:      Cervical: No cervical adenopathy  Skin:     General: Skin is warm  Neurological:      Mental Status: She is alert and oriented to person, place, and time  Cranial Nerves: No cranial nerve deficit  Psychiatric:         Mood and Affect: Mood normal          Behavior: Behavior normal          Thought Content:  Thought content normal          Judgment: Judgment normal

## 2021-10-28 ENCOUNTER — OFFICE VISIT (OUTPATIENT)
Dept: UROLOGY | Facility: MEDICAL CENTER | Age: 86
End: 2021-10-28
Payer: COMMERCIAL

## 2021-10-28 VITALS
BODY MASS INDEX: 23.04 KG/M2 | WEIGHT: 130 LBS | HEIGHT: 63 IN | SYSTOLIC BLOOD PRESSURE: 120 MMHG | DIASTOLIC BLOOD PRESSURE: 62 MMHG | HEART RATE: 58 BPM

## 2021-10-28 DIAGNOSIS — D17.71 RENAL ANGIOMYOLIPOMA: Primary | ICD-10-CM

## 2021-10-28 PROCEDURE — 99204 OFFICE O/P NEW MOD 45 MIN: CPT | Performed by: UROLOGY

## 2021-11-02 DIAGNOSIS — G47.00 INSOMNIA, UNSPECIFIED TYPE: ICD-10-CM

## 2021-11-02 RX ORDER — LORAZEPAM 0.5 MG/1
0.5 TABLET ORAL AS NEEDED
Qty: 30 TABLET | Refills: 0 | Status: SHIPPED | OUTPATIENT
Start: 2021-11-02 | End: 2022-01-07 | Stop reason: SDUPTHER

## 2021-11-22 ENCOUNTER — OFFICE VISIT (OUTPATIENT)
Dept: INTERNAL MEDICINE CLINIC | Facility: CLINIC | Age: 86
End: 2021-11-22
Payer: COMMERCIAL

## 2021-11-22 VITALS
TEMPERATURE: 98.4 F | HEART RATE: 78 BPM | WEIGHT: 128 LBS | BODY MASS INDEX: 22.68 KG/M2 | SYSTOLIC BLOOD PRESSURE: 118 MMHG | OXYGEN SATURATION: 98 % | DIASTOLIC BLOOD PRESSURE: 74 MMHG | HEIGHT: 63 IN

## 2021-11-22 DIAGNOSIS — N18.31 STAGE 3A CHRONIC KIDNEY DISEASE (HCC): ICD-10-CM

## 2021-11-22 DIAGNOSIS — D17.71 RENAL ANGIOMYOLIPOMA: ICD-10-CM

## 2021-11-22 DIAGNOSIS — M81.0 AGE-RELATED OSTEOPOROSIS WITHOUT CURRENT PATHOLOGICAL FRACTURE: ICD-10-CM

## 2021-11-22 DIAGNOSIS — E78.2 MIXED HYPERLIPIDEMIA: Primary | ICD-10-CM

## 2021-11-22 DIAGNOSIS — I10 BENIGN ESSENTIAL HYPERTENSION: ICD-10-CM

## 2021-11-22 DIAGNOSIS — E55.9 VITAMIN D DEFICIENCY: ICD-10-CM

## 2021-11-22 PROCEDURE — 99214 OFFICE O/P EST MOD 30 MIN: CPT | Performed by: INTERNAL MEDICINE

## 2021-11-22 RX ORDER — RISEDRONATE SODIUM 35 MG/1
35 TABLET, FILM COATED ORAL
Qty: 12 TABLET | Refills: 2 | Status: SHIPPED | OUTPATIENT
Start: 2021-11-22 | End: 2022-03-14 | Stop reason: SDUPTHER

## 2021-11-30 ENCOUNTER — TELEPHONE (OUTPATIENT)
Dept: INTERNAL MEDICINE CLINIC | Facility: CLINIC | Age: 86
End: 2021-11-30

## 2022-01-07 DIAGNOSIS — K21.9 GASTROESOPHAGEAL REFLUX DISEASE WITHOUT ESOPHAGITIS: ICD-10-CM

## 2022-01-07 DIAGNOSIS — E78.2 MIXED HYPERLIPIDEMIA: ICD-10-CM

## 2022-01-07 DIAGNOSIS — G47.00 INSOMNIA, UNSPECIFIED TYPE: ICD-10-CM

## 2022-01-07 DIAGNOSIS — I10 ESSENTIAL HYPERTENSION: ICD-10-CM

## 2022-01-07 RX ORDER — OMEPRAZOLE 20 MG/1
20 CAPSULE, DELAYED RELEASE ORAL DAILY
Qty: 90 CAPSULE | Refills: 1 | Status: SHIPPED | OUTPATIENT
Start: 2022-01-07 | End: 2022-03-14 | Stop reason: SDUPTHER

## 2022-01-07 RX ORDER — LORAZEPAM 0.5 MG/1
0.5 TABLET ORAL AS NEEDED
Qty: 30 TABLET | Refills: 0 | Status: SHIPPED | OUTPATIENT
Start: 2022-01-07 | End: 2022-05-25 | Stop reason: SDUPTHER

## 2022-01-07 RX ORDER — ATORVASTATIN CALCIUM 20 MG/1
20 TABLET, FILM COATED ORAL DAILY
Qty: 90 TABLET | Refills: 1 | Status: SHIPPED | OUTPATIENT
Start: 2022-01-07 | End: 2022-03-14 | Stop reason: SDUPTHER

## 2022-01-07 RX ORDER — AMLODIPINE BESYLATE 5 MG/1
5 TABLET ORAL DAILY
Qty: 90 TABLET | Refills: 1 | Status: SHIPPED | OUTPATIENT
Start: 2022-01-07 | End: 2022-03-14 | Stop reason: SDUPTHER

## 2022-02-03 DIAGNOSIS — E78.2 MIXED HYPERLIPIDEMIA: ICD-10-CM

## 2022-02-03 RX ORDER — ATORVASTATIN CALCIUM 20 MG/1
20 TABLET, FILM COATED ORAL DAILY
Qty: 90 TABLET | Refills: 1 | Status: CANCELLED | OUTPATIENT
Start: 2022-02-03

## 2022-02-03 NOTE — TELEPHONE ENCOUNTER
Pt req refills of Atorvastatin but changed her mind about Atorvastatin being sent to [facility]

## 2022-03-14 ENCOUNTER — OFFICE VISIT (OUTPATIENT)
Dept: INTERNAL MEDICINE CLINIC | Facility: OTHER | Age: 87
End: 2022-03-14
Payer: COMMERCIAL

## 2022-03-14 VITALS
HEART RATE: 61 BPM | RESPIRATION RATE: 18 BRPM | TEMPERATURE: 98.3 F | SYSTOLIC BLOOD PRESSURE: 134 MMHG | WEIGHT: 130.4 LBS | HEIGHT: 63 IN | OXYGEN SATURATION: 99 % | DIASTOLIC BLOOD PRESSURE: 66 MMHG | BODY MASS INDEX: 23.11 KG/M2

## 2022-03-14 DIAGNOSIS — M81.0 AGE-RELATED OSTEOPOROSIS WITHOUT CURRENT PATHOLOGICAL FRACTURE: ICD-10-CM

## 2022-03-14 DIAGNOSIS — I10 ESSENTIAL HYPERTENSION: ICD-10-CM

## 2022-03-14 DIAGNOSIS — I10 BENIGN ESSENTIAL HYPERTENSION: ICD-10-CM

## 2022-03-14 DIAGNOSIS — E78.2 MIXED HYPERLIPIDEMIA: ICD-10-CM

## 2022-03-14 DIAGNOSIS — E55.9 VITAMIN D DEFICIENCY: ICD-10-CM

## 2022-03-14 DIAGNOSIS — K21.9 GASTROESOPHAGEAL REFLUX DISEASE WITHOUT ESOPHAGITIS: Primary | ICD-10-CM

## 2022-03-14 DIAGNOSIS — K21.9 GASTROESOPHAGEAL REFLUX DISEASE WITHOUT ESOPHAGITIS: ICD-10-CM

## 2022-03-14 DIAGNOSIS — I83.93 ASYMPTOMATIC VARICOSE VEINS OF BOTH LOWER EXTREMITIES: ICD-10-CM

## 2022-03-14 DIAGNOSIS — G47.00 INSOMNIA, UNSPECIFIED TYPE: ICD-10-CM

## 2022-03-14 PROCEDURE — 3078F DIAST BP <80 MM HG: CPT | Performed by: INTERNAL MEDICINE

## 2022-03-14 PROCEDURE — 3075F SYST BP GE 130 - 139MM HG: CPT | Performed by: INTERNAL MEDICINE

## 2022-03-14 PROCEDURE — 1160F RVW MEDS BY RX/DR IN RCRD: CPT | Performed by: INTERNAL MEDICINE

## 2022-03-14 PROCEDURE — 1036F TOBACCO NON-USER: CPT | Performed by: INTERNAL MEDICINE

## 2022-03-14 PROCEDURE — 99214 OFFICE O/P EST MOD 30 MIN: CPT | Performed by: INTERNAL MEDICINE

## 2022-03-14 RX ORDER — OMEPRAZOLE 20 MG/1
20 CAPSULE, DELAYED RELEASE ORAL DAILY
Qty: 90 CAPSULE | Refills: 1 | Status: SHIPPED | OUTPATIENT
Start: 2022-03-14

## 2022-03-14 RX ORDER — AMLODIPINE BESYLATE 5 MG/1
5 TABLET ORAL DAILY
Qty: 90 TABLET | Refills: 1 | Status: SHIPPED | OUTPATIENT
Start: 2022-03-14 | End: 2022-08-09 | Stop reason: SDUPTHER

## 2022-03-14 RX ORDER — ATORVASTATIN CALCIUM 20 MG/1
20 TABLET, FILM COATED ORAL DAILY
Qty: 90 TABLET | Refills: 1 | Status: SHIPPED | OUTPATIENT
Start: 2022-03-14

## 2022-03-14 RX ORDER — RISEDRONATE SODIUM 35 MG/1
35 TABLET, FILM COATED ORAL
Qty: 12 TABLET | Refills: 2 | Status: SHIPPED | OUTPATIENT
Start: 2022-03-14

## 2022-03-14 NOTE — PROGRESS NOTES
Assessment/Plan:    1  Essential hypertension  Blood pressure is well controlled on present regimen    2  Insomnia  Doing well on present regimen    3  GERD  Continue with Prilosec 20 mg daily    4  Hyperlipidemia  Lipid profile is in excellent range  Continue with Lipitor 20 mg daily    5  Osteoporosis  Continue with calcium, vitamin-D and Actonel 35 mg weekly  Diagnoses and all orders for this visit:    Gastroesophageal reflux disease without esophagitis    Benign essential hypertension    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    Age-related osteoporosis without current pathological fracture    Vitamin D deficiency    Insomnia, unspecified type               Subjective:          Patient ID: Darinel Arroyo is a 81 y o  female  Patient is here for regular follow-up  Last blood work in January and would like to discuss results  Otherwise no new complaints  The following portions of the patient's history were reviewed and updated as appropriate: allergies, current medications, past family history, past medical history, past social history, past surgical history and problem list     Review of Systems   Constitutional: Negative for fatigue and fever  HENT: Negative for congestion, ear discharge, ear pain, postnasal drip, sinus pressure, sore throat, tinnitus and trouble swallowing  Eyes: Negative for discharge, itching and visual disturbance  Respiratory: Negative for cough and shortness of breath  Cardiovascular: Negative for chest pain and palpitations  Gastrointestinal: Negative for abdominal pain, diarrhea, nausea and vomiting  Endocrine: Negative for cold intolerance and polyuria  Genitourinary: Negative for difficulty urinating, dysuria and urgency  Musculoskeletal: Positive for arthralgias  Negative for neck pain  Skin: Negative for rash  Allergic/Immunologic: Negative for environmental allergies  Neurological: Negative for dizziness, weakness and headaches  Psychiatric/Behavioral: Negative for agitation and behavioral problems  The patient is not nervous/anxious            Past Medical History:   Diagnosis Date    Anxiety     Last Assessed:11/24/2014    Appendicitis     Asymptomatic varicose veins     Last Assessed:10/8/2014    Esophageal reflux     Last Assessed:11/3/2014    Fracture of rib     Last Assessed:8/7/2015    Insomnia     Last Assessed:3/18/2014    Macular degeneration     Syncope     pt reports recent syncope    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)     Last Assessed:3/18/2014         Current Outpatient Medications:     acetaminophen (TYLENOL) 500 mg tablet, Take 1 tablet (500 mg total) by mouth every 4 (four) hours as needed (knee and back pain), Disp: 90 tablet, Rfl: 1    amLODIPine (NORVASC) 5 mg tablet, Take 1 tablet (5 mg total) by mouth daily, Disp: 90 tablet, Rfl: 1    atorvastatin (LIPITOR) 20 mg tablet, Take 1 tablet (20 mg total) by mouth daily, Disp: 90 tablet, Rfl: 1    calcium carbonate (OS-VIDA) 600 MG tablet, Take 600 mg by mouth 2 (two) times a day with meals , Disp: , Rfl:     Cholecalciferol (VITAMIN D3 PO), Take 1 capsule by mouth daily 1000, Disp: , Rfl:     Cranberry-Vitamin C-Probiotic (AZO CRANBERRY PO), Take by mouth daily, Disp: , Rfl:     Diclofenac Sodium (VOLTAREN) 1 %, Apply 2 g topically 4 (four) times a day, Disp: 1 Tube, Rfl: 2    LORazepam (ATIVAN) 0 5 mg tablet, Take 1 tablet (0 5 mg total) by mouth as needed (help sleep), Disp: 30 tablet, Rfl: 0    Multiple Vitamins-Minerals (PRESERVISION AREDS 2) CAPS, Take 1 capsule by mouth daily, Disp: 90 capsule, Rfl: 1    omeprazole (PriLOSEC) 20 mg delayed release capsule, Take 1 capsule (20 mg total) by mouth daily, Disp: 90 capsule, Rfl: 1    phenazopyridine (PYRIDIUM) 200 mg tablet, Take 1 tablet (200 mg total) by mouth 3 (three) times a day with meals as needed for bladder pain, Disp: 15 tablet, Rfl: 0    risedronate (ACTONEL) 35 mg tablet, Take 1 tablet (35 mg total) by mouth every 7 days with water on empty stomach, nothing by mouth or lie down for next 30 minutes  , Disp: 12 tablet, Rfl: 2    Allergies   Allergen Reactions    Daypro [Oxaprozin]      Unknown allergic reaction    Minocycline Other (See Comments)     Loss of taste       Social History   Past Surgical History:   Procedure Laterality Date    APPENDECTOMY      CATARACT EXTRACTION, BILATERAL      HYSTERECTOMY      INCISIONAL BREAST BIOPSY       Family History   Problem Relation Age of Onset    Diabetes Mother     Varicose Veins Mother     Diabetes Brother        Objective:  /66 (BP Location: Left arm, Patient Position: Sitting, Cuff Size: Standard)   Pulse 61   Temp 98 3 °F (36 8 °C) (Temporal)   Resp 18   Ht 5' 3" (1 6 m)   Wt 59 1 kg (130 lb 6 4 oz)   SpO2 99%   BMI 23 10 kg/m²   Body mass index is 23 1 kg/m²  Physical Exam  Constitutional:       Appearance: She is well-developed  HENT:      Head: Normocephalic  Right Ear: External ear normal       Left Ear: External ear normal       Nose: No rhinorrhea  Mouth/Throat:      Pharynx: No posterior oropharyngeal erythema  Eyes:      General: No scleral icterus  Pupils: Pupils are equal, round, and reactive to light  Neck:      Thyroid: No thyromegaly  Trachea: No tracheal deviation  Cardiovascular:      Rate and Rhythm: Normal rate and regular rhythm  Heart sounds: Normal heart sounds  No murmur heard  Pulmonary:      Effort: Pulmonary effort is normal  No respiratory distress  Breath sounds: Normal breath sounds  Chest:      Chest wall: No tenderness  Abdominal:      General: Bowel sounds are normal       Palpations: Abdomen is soft  There is no mass  Tenderness: There is no abdominal tenderness  Musculoskeletal:         General: Normal range of motion  Cervical back: Normal range of motion and neck supple        Right lower leg: No edema  Left lower leg: No edema  Lymphadenopathy:      Cervical: No cervical adenopathy  Skin:     General: Skin is warm  Neurological:      Mental Status: She is alert and oriented to person, place, and time  Cranial Nerves: No cranial nerve deficit  Psychiatric:         Mood and Affect: Mood normal          Behavior: Behavior normal          Thought Content:  Thought content normal

## 2022-03-16 ENCOUNTER — TELEPHONE (OUTPATIENT)
Dept: INTERNAL MEDICINE CLINIC | Facility: OTHER | Age: 87
End: 2022-03-16

## 2022-03-23 NOTE — TELEPHONE ENCOUNTER
Prior auth approved, called [facility] to notify and have them process the order we sent on 3/14/2022 to be shiooed to make  Medication did go through and is being shipped per [facility]   LMOM for patient on phone number [phone], letting her know medication is being shipped and will receive it soon

## 2022-04-25 ENCOUNTER — TELEPHONE (OUTPATIENT)
Dept: UROLOGY | Facility: MEDICAL CENTER | Age: 87
End: 2022-04-25

## 2022-04-25 NOTE — TELEPHONE ENCOUNTER
Patient having [address] 4/29 and requests a phone call with results since   May 9th appointment is being cancelled

## 2022-04-28 ENCOUNTER — HOSPITAL ENCOUNTER (OUTPATIENT)
Dept: RADIOLOGY | Facility: IMAGING CENTER | Age: 87
Discharge: HOME | End: 2022-04-28
Payer: COMMERCIAL

## 2022-04-28 DIAGNOSIS — D17.71 RENAL ANGIOMYOLIPOMA: ICD-10-CM

## 2022-04-28 PROCEDURE — 76770 US EXAM ABDO BACK WALL COMP: CPT

## 2022-05-03 ENCOUNTER — TELEPHONE (OUTPATIENT)
Dept: UROLOGY | Facility: CLINIC | Age: 87
End: 2022-05-03

## 2022-05-03 NOTE — TELEPHONE ENCOUNTER
Called and reviewed US results with patient  She is aware to have repeat US completed in 1 year, prior to office follow up  She was provided with central scheduling phone number and will call to schedule US for next year  Advised patient that our office will contact her when it's time to schedule annual follow up  Patient verbalized understanding

## 2022-05-03 NOTE — TELEPHONE ENCOUNTER
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

## 2022-05-23 ENCOUNTER — RA CDI HCC (OUTPATIENT)
Dept: OTHER | Facility: HOSPITAL | Age: 87
End: 2022-05-23

## 2022-05-23 NOTE — PROGRESS NOTES
[address]  coding opportunities       Chart reviewed, no opportunity found: CHART REVIEWED, NO OPPORTUNITY FOUND        Patients Insurance     Medicare Insurance: Medicare

## 2022-05-25 DIAGNOSIS — G47.00 INSOMNIA, UNSPECIFIED TYPE: ICD-10-CM

## 2022-05-25 RX ORDER — LORAZEPAM 0.5 MG/1
0.5 TABLET ORAL AS NEEDED
Qty: 30 TABLET | Refills: 0 | Status: SHIPPED | OUTPATIENT
Start: 2022-05-25

## 2022-06-06 ENCOUNTER — TELEPHONE (OUTPATIENT)
Dept: INTERNAL MEDICINE CLINIC | Facility: OTHER | Age: 87
End: 2022-06-06

## 2022-06-06 NOTE — TELEPHONE ENCOUNTER
Pt called and asked for recommendation on ENT dr  Is there any specific dr you recommend?
Pt given information
[facility] with Dr Geoffrey Hill will be fine
[facility] PMD felt that patient was more somnolent than usual (staff at his house disagrees, notes he fluxes between somnolent and feisty), and continued edema of the left lower extremity and left hand after a ? fall that occurred about 3 weeks ago. He had normal doppler, normal XR's and is being treated for cellulitis of the left lower extremity. Exam shows tense edema of the left lower extremity with decreased ROM, will obtain XR and labs as well as CT head (ordered from intake due to somnolence). Will have PT see patient and reassess.

## 2022-06-07 ENCOUNTER — TELEPHONE (OUTPATIENT)
Dept: INTERNAL MEDICINE CLINIC | Facility: OTHER | Age: 87
End: 2022-06-07

## 2022-06-07 NOTE — TELEPHONE ENCOUNTER
Pt will be having a fu appt on 06/13/2022 and would like to know if she needs to get bw for that appt  Last bw was in January 2022

## 2022-06-13 ENCOUNTER — OFFICE VISIT (OUTPATIENT)
Dept: INTERNAL MEDICINE CLINIC | Facility: OTHER | Age: 87
End: 2022-06-13
Payer: COMMERCIAL

## 2022-06-13 VITALS
SYSTOLIC BLOOD PRESSURE: 130 MMHG | DIASTOLIC BLOOD PRESSURE: 60 MMHG | HEIGHT: 63 IN | OXYGEN SATURATION: 98 % | WEIGHT: 133.6 LBS | HEART RATE: 71 BPM | BODY MASS INDEX: 23.67 KG/M2 | TEMPERATURE: 98.4 F

## 2022-06-13 DIAGNOSIS — I10 ESSENTIAL HYPERTENSION: ICD-10-CM

## 2022-06-13 DIAGNOSIS — K21.9 GASTROESOPHAGEAL REFLUX DISEASE WITHOUT ESOPHAGITIS: ICD-10-CM

## 2022-06-13 DIAGNOSIS — Z00.00 MEDICARE ANNUAL WELLNESS VISIT, SUBSEQUENT: ICD-10-CM

## 2022-06-13 DIAGNOSIS — E55.9 VITAMIN D DEFICIENCY: ICD-10-CM

## 2022-06-13 DIAGNOSIS — R73.9 HYPERGLYCEMIA: Primary | ICD-10-CM

## 2022-06-13 DIAGNOSIS — E78.2 MIXED HYPERLIPIDEMIA: ICD-10-CM

## 2022-06-13 PROCEDURE — 3078F DIAST BP <80 MM HG: CPT | Performed by: INTERNAL MEDICINE

## 2022-06-13 PROCEDURE — 1036F TOBACCO NON-USER: CPT | Performed by: INTERNAL MEDICINE

## 2022-06-13 PROCEDURE — 99214 OFFICE O/P EST MOD 30 MIN: CPT | Performed by: INTERNAL MEDICINE

## 2022-06-13 PROCEDURE — 3075F SYST BP GE 130 - 139MM HG: CPT | Performed by: INTERNAL MEDICINE

## 2022-06-13 PROCEDURE — 1101F PT FALLS ASSESS-DOCD LE1/YR: CPT | Performed by: INTERNAL MEDICINE

## 2022-06-13 PROCEDURE — 1170F FXNL STATUS ASSESSED: CPT | Performed by: INTERNAL MEDICINE

## 2022-06-13 PROCEDURE — 1160F RVW MEDS BY RX/DR IN RCRD: CPT | Performed by: INTERNAL MEDICINE

## 2022-06-13 PROCEDURE — 1125F AMNT PAIN NOTED PAIN PRSNT: CPT | Performed by: INTERNAL MEDICINE

## 2022-06-13 PROCEDURE — G0439 PPPS, SUBSEQ VISIT: HCPCS | Performed by: INTERNAL MEDICINE

## 2022-06-13 PROCEDURE — 3725F SCREEN DEPRESSION PERFORMED: CPT | Performed by: INTERNAL MEDICINE

## 2022-06-13 PROCEDURE — 3288F FALL RISK ASSESSMENT DOCD: CPT | Performed by: INTERNAL MEDICINE

## 2022-06-13 NOTE — PROGRESS NOTES
Assessment and Plan:     Problem List Items Addressed This Visit    None         Depression Screening and Follow-up Plan: Patient was screened for depression during today's encounter  They screened negative with a PHQ-2 score of 0  Preventive health issues were discussed with patient, and age appropriate screening tests were ordered as noted in patient's After Visit Summary  Personalized health advice and appropriate referrals for health education or preventive services given if needed, as noted in patient's After Visit Summary       History of Present Illness:     Patient presents for Medicare Annual Wellness visit    Patient Care Team:  Gabriella Lemus MD as PCP - General  Mary Ellen Mueller PA-C     Problem List:     Patient Active Problem List   Diagnosis    Benign essential hypertension    Mixed hyperlipidemia    Vitamin D deficiency    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    Gastroesophageal reflux disease without esophagitis    Age-related cataract of both eyes    CKD (chronic kidney disease) stage 3, GFR 30-59 ml/min ([facility])    Tear of skin of heel, initial encounter    Cracked skin on feet    Hyperglycemia    Insomnia    History of basal cell carcinoma    Vasovagal syncope    Lightheadedness    Postural dizziness with presyncope    Recurrent UTI    Intermittent diarrhea    Pain in both feet    Age-related osteoporosis without current pathological fracture    Acute right ankle pain    Closed fracture of bone of right foot    Dysuria    Renal angiomyolipoma      Past Medical and Surgical History:     Past Medical History:   Diagnosis Date    Anxiety     Last Assessed:11/24/2014    Appendicitis     Asymptomatic varicose veins     Last Assessed:10/8/2014    Esophageal reflux     Last Assessed:11/3/2014    Fracture of rib     Last Assessed:8/7/2015    Insomnia     Last Assessed:3/18/2014    Macular degeneration     Syncope     pt reports recent syncope    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)     Last Assessed:3/18/2014     Past Surgical History:   Procedure Laterality Date    APPENDECTOMY      CATARACT EXTRACTION, BILATERAL      HYSTERECTOMY      INCISIONAL BREAST BIOPSY        Family History:     Family History   Problem Relation Age of Onset    Diabetes Mother     Varicose Veins Mother     Diabetes Brother       Social History:     Social History     Socioeconomic History    Marital status: /Civil Union     Spouse name: None    Number of children: None    Years of education: None    Highest education level: None   Occupational History    None   Tobacco Use    Smoking status: Never Smoker    Smokeless tobacco: Never Used   Vaping Use    Vaping Use: Never used   Substance and Sexual Activity    Alcohol use: Yes     Comment: socially    Drug use: No    Sexual activity: Not Currently   Other Topics Concern    None   Social History Narrative    None     Social Determinants of Health     Financial Resource Strain: Not on file   Food Insecurity: Not on file   Transportation Needs: Not on file   Physical Activity: Not on file   Stress: Not on file   Social Connections: Not on file   Intimate Partner Violence: Not on file   Housing Stability: Not on file      Medications and Allergies:     Current Outpatient Medications   Medication Sig Dispense Refill    acetaminophen (TYLENOL) 500 mg tablet Take 1 tablet (500 mg total) by mouth every 4 (four) hours as needed (knee and back pain) 90 tablet 1    amLODIPine (NORVASC) 5 mg tablet Take 1 tablet (5 mg total) by mouth daily 90 tablet 1    atorvastatin (LIPITOR) 20 mg tablet Take 1 tablet (20 mg total) by mouth daily 90 tablet 1    calcium carbonate (OS-VIDA) 600 MG tablet Take 600 mg by mouth 2 (two) times a day with meals       Cholecalciferol (VITAMIN D3 PO) Take 1 capsule by mouth daily 2000      Cranberry-Vitamin C-Probiotic (AZO CRANBERRY PO) Take by mouth daily      Diclofenac Sodium (VOLTAREN) 1 % Apply 2 g topically 4 (four) times a day 1 Tube 2    LORazepam (ATIVAN) 0 5 mg tablet Take 1 tablet (0 5 mg total) by mouth as needed (help sleep) 30 tablet 0    Multiple Vitamins-Minerals (PRESERVISION AREDS 2) CAPS Take 1 capsule by mouth daily 90 capsule 1    omeprazole (PriLOSEC) 20 mg delayed release capsule Take 1 capsule (20 mg total) by mouth daily 90 capsule 1    phenazopyridine (PYRIDIUM) 200 mg tablet Take 1 tablet (200 mg total) by mouth 3 (three) times a day with meals as needed for bladder pain 15 tablet 0    risedronate (ACTONEL) 35 mg tablet Take 1 tablet (35 mg total) by mouth every 7 days with water on empty stomach, nothing by mouth or lie down for next 30 minutes  12 tablet 2     No current facility-administered medications for this visit  Allergies   Allergen Reactions    Daypro [Oxaprozin]      Unknown allergic reaction    Minocycline Other (See Comments)     Loss of taste      Immunizations:     Immunization History   Administered Date(s) Administered    COVID-19 PFIZER VACCINE 0 3 ML IM 02/17/2021, 03/10/2021    INFLUENZA 10/30/2005, 12/12/2006, 10/05/2007, 10/23/2009, 10/01/2010, 10/03/2011, 10/10/2011, 10/01/2013, 10/02/2014, 09/18/2015, 10/06/2016    Influenza Split High Dose Preservative Free IM 10/02/2014, 09/18/2015, 10/06/2016, 10/01/2019    Influenza, high dose seasonal 0 7 mL 10/01/2018, 09/21/2020    Influenza, seasonal, injectable 10/01/2013    Pneumococcal Conjugate 13-Valent 04/29/2016    Pneumococcal Polysaccharide PPV23 11/01/2004    Td (adult), Unspecified 10/31/2005    Zoster 11/01/2013    Zoster Vaccine Recombinant 10/28/2020, 12/29/2020      Health Maintenance: There are no preventive care reminders to display for this patient        Topic Date Due    DTaP,Tdap,and Td Vaccines (1 - Tdap) 11/01/2005    COVID-19 Vaccine (3 - Booster for Pfizer series) 08/10/2021    Influenza Vaccine (Season Ended) 09/01/2022 Medicare Health Risk Assessment:     /60 (BP Location: Left arm, Patient Position: Sitting, Cuff Size: Adult)   Pulse 71   Temp 98 4 °F (36 9 °C) (Tympanic)   Ht 5' 3" (1 6 m)   Wt 60 6 kg (133 lb 9 6 oz)   SpO2 98% Comment: ra  BMI 23 67 kg/m²      Farhan Whitley is here for her Subsequent Wellness visit  Health Risk Assessment:   Patient rates overall health as good  Patient feels that their physical health rating is slightly worse  Patient is satisfied with their life  Eyesight was rated as slightly worse  Hearing was rated as slightly worse  Patient feels that their emotional and mental health rating is same  Patients states they are never, rarely angry  Patient states they are sometimes unusually tired/fatigued  Pain experienced in the last 7 days has been some  Patient's pain rating has been 2/10  Patient states that she has experienced no weight loss or gain in last 6 months  Depression Screening:   PHQ-2 Score: 0      Fall Risk Screening: In the past year, patient has experienced: no history of falling in past year      Urinary Incontinence Screening:   Patient has leaked urine accidently in the last six months  Home Safety:  Patient does not have trouble with stairs inside or outside of their home  Patient has working smoke alarms Home safety hazards include: none  Nutrition:   Current diet is Regular  Medications:   Patient is currently taking over-the-counter supplements  OTC medications include: see medication list  Patient is able to manage medications  Activities of Daily Living (ADLs)/Instrumental Activities of Daily Living (IADLs):   Walk and transfer into and out of bed and chair?: Yes  Dress and groom yourself?: Yes    Bathe or shower yourself?: Yes    Feed yourself?  Yes  Do your laundry/housekeeping?: Yes  Manage your money, pay your bills and track your expenses?: Yes  Make your own meals?: Yes    Do your own shopping?: Yes    Previous Hospitalizations:   Any hospitalizations or ED visits within the last 12 months?: No      Advance Care Planning:   Living will: Yes    Durable POA for healthcare: Yes    Advanced directive: Yes      Cognitive Screening:   Provider or family/friend/caregiver concerned regarding cognition?: No    PREVENTIVE SCREENINGS      Cardiovascular Screening:    General: Screening Not Indicated and History Lipid Disorder      Diabetes Screening:     General: Screening Current      Colorectal Cancer Screening:     General: Screening Not Indicated      Breast Cancer Screening:     General: Screening Current and Risks and Benefits Discussed      Cervical Cancer Screening:    General: Screening Not Indicated      Osteoporosis Screening:    General: Screening Not Indicated and History Osteoporosis      Abdominal Aortic Aneurysm (AAA) Screening:        General: Patient Declines      Lung Cancer Screening:     General: Screening Not Indicated      Hepatitis C Screening:    General: Screening Not Indicated    Screening, Brief Intervention, and Referral to Treatment (SBIRT)    Screening  Typical number of drinks in a day: 0  Typical number of drinks in a week: 3  Interpretation: Low risk drinking behavior  Single Item Drug Screening:  How often have you used an illegal drug (including marijuana) or a prescription medication for non-medical reasons in the past year? never    Single Item Drug Screen Score: 0  Interpretation: Negative screen for possible drug use disorder    Brief Intervention  Alcohol & drug use screenings were reviewed  No concerns regarding substance use disorder identified  Other Counseling Topics:   Car/seat belt/driving safety, skin self-exam, sunscreen and calcium and vitamin D intake and regular weightbearing exercise         Adela Angel MD

## 2022-06-13 NOTE — PROGRESS NOTES
Assessment/Plan:    1  Essential hypertension  Blood pressure is well controlled on present regimen  2  Hyperlipidemia  Continue with Lipitor 20 mg daily  Will check lipid profile before next visit  3  Osteoporosis  Continue with present regimen along with weight-bearing exercise  4  GERD  Stable on Prilosec 20 mg daily  Continue with healthy lifestyle  5  Vitamin-D deficiency  Continue with vitamin-D 3 supplementation  6  Hyperglycemia  Will check hemoglobin A1c before next visit  Advised for low sugar/low carb diet     Diagnoses and all orders for this visit:    Hyperglycemia  -     Hemoglobin A1C; Future    Vitamin D deficiency    Mixed hyperlipidemia  -     Lipid Panel with Direct LDL reflex; Future  -     Comprehensive metabolic panel; Future    Gastroesophageal reflux disease without esophagitis  -     CBC; Future  -     Comprehensive metabolic panel; Future    Essential hypertension  -     CBC; Future    Medicare annual wellness visit, subsequent            Depression Screening and Follow-up Plan: Patient was screened for depression during today's encounter  They screened negative with a PHQ-2 score of 0  Subjective:          Patient ID: Gerardo Oleary is a 81 y o  female  Patient is here for regular follow-up  No blood work prior to this visit  Otherwise no new complaints  The following portions of the patient's history were reviewed and updated as appropriate: allergies, current medications, past family history, past medical history, past social history, past surgical history and problem list     Review of Systems   Constitutional: Negative for fatigue and fever  HENT: Negative for congestion, ear discharge, ear pain, postnasal drip, sinus pressure, sore throat, tinnitus and trouble swallowing  Eyes: Negative for discharge, itching and visual disturbance  Respiratory: Negative for cough and shortness of breath  Cardiovascular: Negative for chest pain and palpitations  Gastrointestinal: Negative for abdominal pain, diarrhea, nausea and vomiting  Endocrine: Negative for cold intolerance and polyuria  Genitourinary: Negative for difficulty urinating, dysuria and urgency  Musculoskeletal: Positive for arthralgias  Negative for neck pain  Skin: Negative for rash  Allergic/Immunologic: Negative for environmental allergies  Neurological: Negative for dizziness, weakness and headaches  Psychiatric/Behavioral: Negative for agitation and behavioral problems  The patient is not nervous/anxious            Past Medical History:   Diagnosis Date    Anxiety     Last Assessed:11/24/2014    Appendicitis     Asymptomatic varicose veins     Last Assessed:10/8/2014    Esophageal reflux     Last Assessed:11/3/2014    Fracture of rib     Last Assessed:8/7/2015    Insomnia     Last Assessed:3/18/2014    Macular degeneration     Syncope     pt reports recent syncope    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)     Last Assessed:3/18/2014         Current Outpatient Medications:     acetaminophen (TYLENOL) 500 mg tablet, Take 1 tablet (500 mg total) by mouth every 4 (four) hours as needed (knee and back pain), Disp: 90 tablet, Rfl: 1    amLODIPine (NORVASC) 5 mg tablet, Take 1 tablet (5 mg total) by mouth daily, Disp: 90 tablet, Rfl: 1    atorvastatin (LIPITOR) 20 mg tablet, Take 1 tablet (20 mg total) by mouth daily, Disp: 90 tablet, Rfl: 1    calcium carbonate (OS-VIDA) 600 MG tablet, Take 600 mg by mouth 2 (two) times a day with meals , Disp: , Rfl:     Cholecalciferol (VITAMIN D3 PO), Take 1 capsule by mouth daily 2000, Disp: , Rfl:     Cranberry-Vitamin C-Probiotic (AZO CRANBERRY PO), Take by mouth daily, Disp: , Rfl:     Diclofenac Sodium (VOLTAREN) 1 %, Apply 2 g topically 4 (four) times a day, Disp: 1 Tube, Rfl: 2    LORazepam (ATIVAN) 0 5 mg tablet, Take 1 tablet (0 5 mg total) by mouth as needed (help sleep), Disp: 30 tablet, Rfl: 0   Multiple Vitamins-Minerals (PRESERVISION AREDS 2) CAPS, Take 1 capsule by mouth daily, Disp: 90 capsule, Rfl: 1    omeprazole (PriLOSEC) 20 mg delayed release capsule, Take 1 capsule (20 mg total) by mouth daily, Disp: 90 capsule, Rfl: 1    phenazopyridine (PYRIDIUM) 200 mg tablet, Take 1 tablet (200 mg total) by mouth 3 (three) times a day with meals as needed for bladder pain, Disp: 15 tablet, Rfl: 0    risedronate (ACTONEL) 35 mg tablet, Take 1 tablet (35 mg total) by mouth every 7 days with water on empty stomach, nothing by mouth or lie down for next 30 minutes  , Disp: 12 tablet, Rfl: 2    Allergies   Allergen Reactions    Daypro [Oxaprozin]      Unknown allergic reaction    Minocycline Other (See Comments)     Loss of taste       Social History   Past Surgical History:   Procedure Laterality Date    APPENDECTOMY      CATARACT EXTRACTION, BILATERAL      HYSTERECTOMY      INCISIONAL BREAST BIOPSY       Family History   Problem Relation Age of Onset    Diabetes Mother     Varicose Veins Mother     Diabetes Brother        Objective:  /60 (BP Location: Left arm, Patient Position: Sitting, Cuff Size: Adult)   Pulse 71   Temp 98 4 °F (36 9 °C) (Tympanic)   Ht 5' 3" (1 6 m)   Wt 60 6 kg (133 lb 9 6 oz)   SpO2 98% Comment: ra  BMI 23 67 kg/m²   Body mass index is 23 67 kg/m²  Physical Exam  Constitutional:       Appearance: She is well-developed  HENT:      Head: Normocephalic  Right Ear: External ear normal       Left Ear: External ear normal       Nose: No rhinorrhea  Mouth/Throat:      Pharynx: No posterior oropharyngeal erythema  Eyes:      General: No scleral icterus  Pupils: Pupils are equal, round, and reactive to light  Neck:      Thyroid: No thyromegaly  Trachea: No tracheal deviation  Cardiovascular:      Rate and Rhythm: Normal rate and regular rhythm  Heart sounds: Normal heart sounds  No murmur heard    Pulmonary:      Effort: Pulmonary effort is normal  No respiratory distress  Breath sounds: Normal breath sounds  Chest:      Chest wall: No tenderness  Abdominal:      General: Bowel sounds are normal       Palpations: Abdomen is soft  There is no mass  Tenderness: There is no abdominal tenderness  Musculoskeletal:         General: Normal range of motion  Cervical back: Normal range of motion and neck supple  Right lower leg: No edema  Left lower leg: No edema  Lymphadenopathy:      Cervical: No cervical adenopathy  Skin:     General: Skin is warm  Neurological:      Mental Status: She is alert and oriented to person, place, and time  Cranial Nerves: No cranial nerve deficit  Psychiatric:         Mood and Affect: Mood normal          Behavior: Behavior normal          Thought Content:  Thought content normal

## 2022-06-28 DIAGNOSIS — M81.0 AGE-RELATED OSTEOPOROSIS WITHOUT CURRENT PATHOLOGICAL FRACTURE: ICD-10-CM

## 2022-06-28 RX ORDER — RISEDRONATE SODIUM 35 MG/1
35 TABLET, FILM COATED ORAL
Qty: 12 TABLET | Refills: 2 | Status: CANCELLED | OUTPATIENT
Start: 2022-06-28

## 2022-06-28 NOTE — TELEPHONE ENCOUNTER
NOV: 10/10/22      Looking for a refill on the actonel but we prescribed 12 and 2 refills on 3/14/22 should have enough until ens of Nov

## 2022-07-05 NOTE — TELEPHONE ENCOUNTER
Dr Laurence Staples would like you to look in to prolia for this patient 
I called the insurance and spoke with João at [facility]  When I gave her the information I would need regarding the Prolia injection and what pharmacy is better for her, she transferred me to [address] which is the pharmacy verification for the insurance   I spoke with Ruth Ann Trujillo at [address] and was told that it is not covered underneath the pharmacy plan for this patient  It maybe covered under them medical plan 
Spoke with Lety Dias from [facility] and he checked the [employer] stating that if we give the Prolia through our office and bill the visit and the Prolia it goes under part B and it will be the patient's responsibility to pay 20 % out of pocket for this patient  Will come out to over $200 00 every 6 months 
Spoke with patient on this and she stated that she would be responsible for $83 00 for the Prolia but the Evenity would be covered at 100%  Stated that I will find out where I can send the Prolia for it to be only $83 00 
Verified with her insurance and it will cost the patient between 200  00 every 6 months for the Prolia    Will contact the patient next week to see if she wants to get the injection
declines

## 2022-08-09 ENCOUNTER — OFFICE VISIT (OUTPATIENT)
Dept: INTERNAL MEDICINE CLINIC | Facility: OTHER | Age: 87
End: 2022-08-09
Payer: COMMERCIAL

## 2022-08-09 VITALS
WEIGHT: 131.4 LBS | HEIGHT: 63 IN | OXYGEN SATURATION: 97 % | RESPIRATION RATE: 20 BRPM | BODY MASS INDEX: 23.28 KG/M2 | HEART RATE: 63 BPM | SYSTOLIC BLOOD PRESSURE: 130 MMHG | DIASTOLIC BLOOD PRESSURE: 68 MMHG | TEMPERATURE: 98.6 F

## 2022-08-09 DIAGNOSIS — M76.892 LEFT KNEE TENDONITIS: Primary | ICD-10-CM

## 2022-08-09 DIAGNOSIS — I10 ESSENTIAL HYPERTENSION: ICD-10-CM

## 2022-08-09 PROCEDURE — 99213 OFFICE O/P EST LOW 20 MIN: CPT | Performed by: INTERNAL MEDICINE

## 2022-08-09 PROCEDURE — 1160F RVW MEDS BY RX/DR IN RCRD: CPT | Performed by: INTERNAL MEDICINE

## 2022-08-09 PROCEDURE — 3075F SYST BP GE 130 - 139MM HG: CPT | Performed by: INTERNAL MEDICINE

## 2022-08-09 PROCEDURE — 3078F DIAST BP <80 MM HG: CPT | Performed by: INTERNAL MEDICINE

## 2022-08-09 RX ORDER — METHYLPREDNISOLONE 4 MG/1
TABLET ORAL
Qty: 21 EACH | Refills: 0 | Status: SHIPPED | OUTPATIENT
Start: 2022-08-09

## 2022-08-09 RX ORDER — AMLODIPINE BESYLATE 5 MG/1
5 TABLET ORAL DAILY
Qty: 90 TABLET | Refills: 1 | Status: SHIPPED | OUTPATIENT
Start: 2022-08-09

## 2022-08-09 NOTE — PROGRESS NOTES
Assessment/Plan:    Post-traumatic wound infection  · Patient hit her right anterior lower extremity against her 's door  · She has an exposed 2 cm wound that appears to look infected  Erythematous margins surrounding the wound  · Tender to palpation  · Wound was irrigated  · Will be receiving 5 days of Bactrim DS and wound care instructions were provided       Diagnoses and all orders for this visit:    Wound infection  -     sulfamethoxazole-trimethoprim (BACTRIM DS) 800-160 mg per tablet 1 tablet    Post-traumatic wound infection          Subjective:   Chief Complaint   Patient presents with    cut on leg     bumped calf on  yesterday and has a cut that is throbbing, she would like someone to look at it   health maintenance     AWV needs to be scheduled , fall risk, PHQ9          Patient ID: Taina Cortes is a 81 y o  female  HPI 82 yo F with h/o CKD III and htn comes to the office concerning a wound on her right lower extremity  She hit her anterior lower extremity against her  door 2 days ago  She has a 2 cm wound with surrounding erythematous margins  No oozing/bleeding from the wound  It does appear infected  The wound was irrigated during the visit and she will receive 5 days of Bactrim DS  The following portions of the patient's history were reviewed and updated as appropriate: allergies, current medications, past family history, past medical history, past social history, past surgical history and problem list     Review of Systems   Constitutional: Negative for chills and fever  HENT: Negative  Eyes: Negative  Respiratory: Negative  Cardiovascular: Negative  Gastrointestinal: Negative  Endocrine: Negative  Genitourinary: Negative  Musculoskeletal: Negative  Skin: Positive for wound (R anterior lower extremity)  Neurological: Negative  Hematological: Negative  Psychiatric/Behavioral: Negative            Objective:      /54 (BP Location: Left arm, Patient Position: Sitting, Cuff Size: Standard)   Pulse 86   Temp 99 8 °F (37 7 °C) (Tympanic)   Resp 18   Ht 5' 3 5" (1 613 m)   Wt 59 1 kg (130 lb 6 4 oz)   SpO2 97%   BMI 22 74 kg/m²          Physical Exam  Constitutional:       General: She is awake  She is not in acute distress  HENT:      Head: Normocephalic and atraumatic  Nose: Nose normal       Mouth/Throat:      Mouth: Mucous membranes are moist    Eyes:      Extraocular Movements: Extraocular movements intact  Cardiovascular:      Rate and Rhythm: Normal rate and regular rhythm  Pulses: Normal pulses  Heart sounds: Normal heart sounds  No murmur  No friction rub  Pulmonary:      Effort: Pulmonary effort is normal  No respiratory distress  Breath sounds: Normal breath sounds  No wheezing, rhonchi or rales  Chest:      Chest wall: No tenderness  Abdominal:      General: Bowel sounds are normal  There is no distension  Palpations: Abdomen is soft  Tenderness: There is no abdominal tenderness  Musculoskeletal: Normal range of motion  General: No swelling, tenderness or deformity  Right lower leg: No edema  Left lower leg: No edema  Skin:     General: Skin is warm  Findings: Wound present  Neurological:      Mental Status: She is alert and oriented to person, place, and time     Psychiatric:         Mood and Affect: Mood normal          Behavior: Behavior normal  No

## 2022-08-09 NOTE — ASSESSMENT & PLAN NOTE
Will treat with Medrol Dosepak  Discussed continuing omeprazole while on steroids  Discussed continuing range of motion and stretching exercises well as alternating between heat and ice compressions  She is to contact office worsening symptoms  Will defer on imaging at this time

## 2022-08-09 NOTE — PROGRESS NOTES
Assessment/Plan:    Left knee tendonitis  Will treat with Medrol Dosepak  Discussed continuing omeprazole while on steroids  Discussed continuing range of motion and stretching exercises well as alternating between heat and ice compressions  She is to contact office worsening symptoms  Will defer on imaging at this time  Diagnoses and all orders for this visit:    Left knee tendonitis  -     methylPREDNISolone 4 MG tablet therapy pack; Use as directed on package    Essential hypertension  -     amLODIPine (NORVASC) 5 mg tablet; Take 1 tablet (5 mg total) by mouth daily                Subjective:      Patient ID: Dyana Ramsey is a 81 y o  female  Chief Complaint   Patient presents with    Knee Pain     Left difficulty walking, no injury notes for past week, has been using a knee ace brace and some cream on it with no help seems like its getting worse       81year old female is seen today with concern for left knee pain since 1 week  She admits to going dancing last week and ever since developed left knee pain  She denies any falls or twisting motion  Knee Pain   The incident occurred more than 1 week ago  There was no injury mechanism  The pain is present in the left knee  The quality of the pain is described as aching and shooting  The pain is at a severity of 4/10  The pain is mild  Pertinent negatives include no numbness  She reports no foreign bodies present  The symptoms are aggravated by palpation and weight bearing  She has tried acetaminophen for the symptoms  The treatment provided mild relief  The following portions of the patient's history were reviewed and updated as appropriate: allergies, current medications, past family history, past medical history, past social history, past surgical history and problem list     Review of Systems   Constitutional: Negative for activity change, appetite change, chills, diaphoresis, fatigue and fever     HENT: Negative for congestion, postnasal drip, rhinorrhea, sinus pressure, sinus pain, sneezing and sore throat  Eyes: Negative for visual disturbance  Respiratory: Negative for apnea, cough, choking, chest tightness, shortness of breath and wheezing  Cardiovascular: Negative for chest pain, palpitations and leg swelling  Gastrointestinal: Negative for abdominal distention, abdominal pain, anal bleeding, blood in stool, constipation, diarrhea, nausea and vomiting  Endocrine: Negative for cold intolerance and heat intolerance  Genitourinary: Negative for difficulty urinating, dysuria and hematuria  Musculoskeletal: Negative  Skin: Negative  Neurological: Negative for dizziness, weakness, light-headedness, numbness and headaches  Hematological: Negative for adenopathy  Psychiatric/Behavioral: Negative for agitation, sleep disturbance and suicidal ideas  All other systems reviewed and are negative          Past Medical History:   Diagnosis Date    Anxiety     Last Assessed:11/24/2014    Appendicitis     Asymptomatic varicose veins     Last Assessed:10/8/2014    Esophageal reflux     Last Assessed:11/3/2014    Fracture of rib     Last Assessed:8/7/2015    Insomnia     Last Assessed:3/18/2014    Macular degeneration     Syncope     pt reports recent syncope    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)     Last Assessed:3/18/2014         Current Outpatient Medications:     acetaminophen (TYLENOL) 500 mg tablet, Take 1 tablet (500 mg total) by mouth every 4 (four) hours as needed (knee and back pain), Disp: 90 tablet, Rfl: 1    amLODIPine (NORVASC) 5 mg tablet, Take 1 tablet (5 mg total) by mouth daily, Disp: 90 tablet, Rfl: 1    atorvastatin (LIPITOR) 20 mg tablet, Take 1 tablet (20 mg total) by mouth daily, Disp: 90 tablet, Rfl: 1    calcium carbonate (OS-VIDA) 600 MG tablet, Take 600 mg by mouth 2 (two) times a day with meals , Disp: , Rfl:     Cholecalciferol (VITAMIN D3 PO), Take 1 capsule by mouth daily 2000, Disp: , Rfl:     Cranberry-Vitamin C-Probiotic (AZO CRANBERRY PO), Take by mouth daily, Disp: , Rfl:     Diclofenac Sodium (VOLTAREN) 1 %, Apply 2 g topically 4 (four) times a day, Disp: 1 Tube, Rfl: 2    LORazepam (ATIVAN) 0 5 mg tablet, Take 1 tablet (0 5 mg total) by mouth as needed (help sleep), Disp: 30 tablet, Rfl: 0    methylPREDNISolone 4 MG tablet therapy pack, Use as directed on package, Disp: 21 each, Rfl: 0    Multiple Vitamins-Minerals (PRESERVISION AREDS 2) CAPS, Take 1 capsule by mouth daily, Disp: 90 capsule, Rfl: 1    omeprazole (PriLOSEC) 20 mg delayed release capsule, Take 1 capsule (20 mg total) by mouth daily, Disp: 90 capsule, Rfl: 1    phenazopyridine (PYRIDIUM) 200 mg tablet, Take 1 tablet (200 mg total) by mouth 3 (three) times a day with meals as needed for bladder pain, Disp: 15 tablet, Rfl: 0    risedronate (ACTONEL) 35 mg tablet, Take 1 tablet (35 mg total) by mouth every 7 days with water on empty stomach, nothing by mouth or lie down for next 30 minutes  , Disp: 12 tablet, Rfl: 2    Allergies   Allergen Reactions    Daypro [Oxaprozin]      Unknown allergic reaction    Minocycline Other (See Comments)     Loss of taste       Social History   Past Surgical History:   Procedure Laterality Date    APPENDECTOMY      CATARACT EXTRACTION, BILATERAL      HYSTERECTOMY      INCISIONAL BREAST BIOPSY       Family History   Problem Relation Age of Onset    Diabetes Mother     Varicose Veins Mother     Diabetes Brother        Objective:  /68 (BP Location: Left arm, Patient Position: Sitting, Cuff Size: Standard)   Pulse 63   Temp 98 6 °F (37 °C) (Temporal)   Resp 20   Ht 5' 3" (1 6 m)   Wt 59 6 kg (131 lb 6 4 oz)   SpO2 97%   BMI 23 28 kg/m²     No results found for this or any previous visit (from the past 1344 hour(s))  Physical Exam  Vitals and nursing note reviewed  Constitutional:       General: She is not in acute distress  Appearance: She is well-developed  She is not diaphoretic  HENT:      Head: Normocephalic and atraumatic  Eyes:      General:         Right eye: No discharge  Left eye: No discharge  Conjunctiva/sclera: Conjunctivae normal       Pupils: Pupils are equal, round, and reactive to light  Neck:      Thyroid: No thyromegaly  Vascular: No JVD  Cardiovascular:      Rate and Rhythm: Normal rate and regular rhythm  Heart sounds: Normal heart sounds  No murmur heard  No friction rub  No gallop  Pulmonary:      Effort: Pulmonary effort is normal  No respiratory distress  Breath sounds: Normal breath sounds  No wheezing or rales  Chest:      Chest wall: No tenderness  Abdominal:      General: There is no distension  Palpations: Abdomen is soft  Tenderness: There is no abdominal tenderness  Musculoskeletal:         General: No deformity  Normal range of motion  Cervical back: Normal range of motion and neck supple  Left knee: Tenderness present over the medial joint line  Lymphadenopathy:      Cervical: No cervical adenopathy  Skin:     General: Skin is warm and dry  Coloration: Skin is not pale  Findings: No erythema or rash  Neurological:      Mental Status: She is alert and oriented to person, place, and time  Cranial Nerves: No cranial nerve deficit  Coordination: Coordination normal    Psychiatric:         Behavior: Behavior normal          Thought Content:  Thought content normal          Judgment: Judgment normal

## 2022-08-17 ENCOUNTER — RA CDI HCC (OUTPATIENT)
Dept: OTHER | Facility: HOSPITAL | Age: 87
End: 2022-08-17

## 2022-08-17 NOTE — PROGRESS NOTES
[facility]  coding opportunities       Chart reviewed, no opportunity found:   [address]        Patients Insurance     Medicare Insurance: [employer]

## 2022-10-03 DIAGNOSIS — K21.9 GASTROESOPHAGEAL REFLUX DISEASE WITHOUT ESOPHAGITIS: ICD-10-CM

## 2022-10-03 RX ORDER — OMEPRAZOLE 20 MG/1
20 CAPSULE, DELAYED RELEASE ORAL DAILY
Qty: 90 CAPSULE | Refills: 1 | Status: SHIPPED | OUTPATIENT
Start: 2022-10-03 | End: 2022-12-12 | Stop reason: SDUPTHER

## 2022-10-05 LAB — HBA1C MFR BLD HPLC: 6.2 %

## 2022-10-10 ENCOUNTER — OFFICE VISIT (OUTPATIENT)
Dept: INTERNAL MEDICINE CLINIC | Facility: OTHER | Age: 87
End: 2022-10-10
Payer: COMMERCIAL

## 2022-10-10 VITALS
DIASTOLIC BLOOD PRESSURE: 64 MMHG | WEIGHT: 131.8 LBS | OXYGEN SATURATION: 98 % | BODY MASS INDEX: 23.35 KG/M2 | TEMPERATURE: 98.2 F | HEIGHT: 63 IN | RESPIRATION RATE: 18 BRPM | HEART RATE: 63 BPM | SYSTOLIC BLOOD PRESSURE: 126 MMHG

## 2022-10-10 DIAGNOSIS — I83.93 ASYMPTOMATIC VARICOSE VEINS OF BOTH LOWER EXTREMITIES: ICD-10-CM

## 2022-10-10 DIAGNOSIS — M81.0 AGE-RELATED OSTEOPOROSIS WITHOUT CURRENT PATHOLOGICAL FRACTURE: ICD-10-CM

## 2022-10-10 DIAGNOSIS — N18.31 STAGE 3A CHRONIC KIDNEY DISEASE (HCC): ICD-10-CM

## 2022-10-10 DIAGNOSIS — I10 BENIGN ESSENTIAL HYPERTENSION: ICD-10-CM

## 2022-10-10 DIAGNOSIS — E78.2 MIXED HYPERLIPIDEMIA: ICD-10-CM

## 2022-10-10 DIAGNOSIS — E55.9 VITAMIN D DEFICIENCY: ICD-10-CM

## 2022-10-10 DIAGNOSIS — K21.9 GASTROESOPHAGEAL REFLUX DISEASE WITHOUT ESOPHAGITIS: Primary | ICD-10-CM

## 2022-10-10 PROCEDURE — 99214 OFFICE O/P EST MOD 30 MIN: CPT | Performed by: INTERNAL MEDICINE

## 2022-10-10 RX ORDER — RISEDRONATE SODIUM 35 MG/1
35 TABLET, FILM COATED ORAL
Qty: 12 TABLET | Refills: 2 | Status: SHIPPED | OUTPATIENT
Start: 2022-10-10

## 2022-10-10 RX ORDER — ATORVASTATIN CALCIUM 20 MG/1
20 TABLET, FILM COATED ORAL DAILY
Qty: 90 TABLET | Refills: 1 | Status: SHIPPED | OUTPATIENT
Start: 2022-10-10

## 2022-10-10 NOTE — ASSESSMENT & PLAN NOTE
Continue with Actonel, calcium and vitamin-D along with weight-bearing exercises    Will repeat DEXA scan next year

## 2022-10-10 NOTE — PROGRESS NOTES
Assessment/Plan:         Diagnoses and all orders for this visit:    Gastroesophageal reflux disease without esophagitis    Mixed hyperlipidemia  -     atorvastatin (LIPITOR) 20 mg tablet; Take 1 tablet (20 mg total) by mouth daily    Age-related osteoporosis without current pathological fracture  -     risedronate (ACTONEL) 35 mg tablet; Take 1 tablet (35 mg total) by mouth every 7 days with water on empty stomach, nothing by mouth or lie down for next 30 minutes  Benign essential hypertension    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    Stage 3a chronic kidney disease (HCC)  -     Basic metabolic panel; Future    Vitamin D deficiency               Subjective:          Patient ID: Jonatan Astudillo is a 81 y o  female  Patient is here for regular follow-up  No new complaints  Also have blood work done would like to discuss results    Shortness of Breath  Pertinent negatives include no chest pain, coughing, dizziness, fatigue, palpitations or sore throat  The following portions of the patient's history were reviewed and updated as appropriate: allergies, current medications, past family history, past medical history, past social history, past surgical history and problem list     Review of Systems   Constitutional: Negative for fatigue and fever  HENT: Negative for congestion, ear discharge, ear pain, postnasal drip, sinus pressure, sore throat, tinnitus and trouble swallowing  Eyes: Negative for discharge, itching and visual disturbance  Respiratory: Positive for shortness of breath  Negative for cough  Shortness of breath with dancing  No chest pain   Cardiovascular: Negative for chest pain and palpitations  Gastrointestinal: Negative for abdominal pain, diarrhea, nausea and vomiting  Endocrine: Negative for cold intolerance and polyuria  Genitourinary: Negative for difficulty urinating, dysuria and urgency  Musculoskeletal: Positive for arthralgias   Negative for neck pain  Skin: Negative for rash  Allergic/Immunologic: Negative for environmental allergies  Neurological: Negative for dizziness, weakness and headaches  Psychiatric/Behavioral: Negative for agitation and behavioral problems  The patient is not nervous/anxious            Past Medical History:   Diagnosis Date   • Anxiety     Last Assessed:11/24/2014   • Appendicitis    • Asymptomatic varicose veins     Last Assessed:10/8/2014   • Esophageal reflux     Last Assessed:11/3/2014   • Fracture of rib     Last Assessed:8/7/2015   • Insomnia     Last Assessed:3/18/2014   • Macular degeneration    • Syncope     pt reports recent syncope   • 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)     Last Assessed:3/18/2014         Current Outpatient Medications:   •  acetaminophen (TYLENOL) 500 mg tablet, Take 1 tablet (500 mg total) by mouth every 4 (four) hours as needed (knee and back pain), Disp: 90 tablet, Rfl: 1  •  amLODIPine (NORVASC) 5 mg tablet, Take 1 tablet (5 mg total) by mouth daily, Disp: 90 tablet, Rfl: 1  •  atorvastatin (LIPITOR) 20 mg tablet, Take 1 tablet (20 mg total) by mouth daily, Disp: 90 tablet, Rfl: 1  •  calcium carbonate (OS-VIDA) 600 MG tablet, Take 600 mg by mouth 2 (two) times a day with meals , Disp: , Rfl:   •  Cholecalciferol (VITAMIN D3 PO), Take 1 capsule by mouth daily 2000, Disp: , Rfl:   •  Cranberry-Vitamin C-Probiotic (AZO CRANBERRY PO), Take by mouth daily, Disp: , Rfl:   •  LORazepam (ATIVAN) 0 5 mg tablet, Take 1 tablet (0 5 mg total) by mouth as needed (help sleep), Disp: 30 tablet, Rfl: 0  •  Multiple Vitamins-Minerals (PRESERVISION AREDS 2) CAPS, Take 1 capsule by mouth daily, Disp: 90 capsule, Rfl: 1  •  omeprazole (PriLOSEC) 20 mg delayed release capsule, Take 1 capsule (20 mg total) by mouth daily, Disp: 90 capsule, Rfl: 1  •  risedronate (ACTONEL) 35 mg tablet, Take 1 tablet (35 mg total) by mouth every 7 days with water on empty stomach, nothing by mouth or lie down for next 30 minutes  , Disp: 12 tablet, Rfl: 2  •  Diclofenac Sodium (VOLTAREN) 1 %, Apply 2 g topically 4 (four) times a day (Patient not taking: Reported on 10/10/2022), Disp: 1 Tube, Rfl: 2  •  methylPREDNISolone 4 MG tablet therapy pack, Use as directed on package (Patient not taking: Reported on 10/10/2022), Disp: 21 each, Rfl: 0  •  phenazopyridine (PYRIDIUM) 200 mg tablet, Take 1 tablet (200 mg total) by mouth 3 (three) times a day with meals as needed for bladder pain (Patient not taking: Reported on 10/10/2022), Disp: 15 tablet, Rfl: 0    Allergies   Allergen Reactions   • Daypro [Oxaprozin]      Unknown allergic reaction   • Minocycline Other (See Comments)     Loss of taste       Social History   Past Surgical History:   Procedure Laterality Date   • APPENDECTOMY     • CATARACT EXTRACTION, BILATERAL     • HYSTERECTOMY     • INCISIONAL BREAST BIOPSY       Family History   Problem Relation Age of Onset   • Diabetes Mother    • Varicose Veins Mother    • Diabetes Brother        Objective:  /64 (BP Location: Left arm, Patient Position: Sitting, Cuff Size: Standard)   Pulse 63   Temp 98 2 °F (36 8 °C) (Temporal)   Resp 18   Ht 5' 3" (1 6 m)   Wt 59 8 kg (131 lb 12 8 oz)   SpO2 98%   BMI 23 35 kg/m²   Body mass index is 23 35 kg/m²  Physical Exam  Constitutional:       Appearance: She is well-developed  HENT:      Head: Normocephalic  Right Ear: Ear canal and external ear normal  There is no impacted cerumen  Left Ear: Ear canal and external ear normal  There is no impacted cerumen  Nose: No rhinorrhea  Mouth/Throat:      Pharynx: No posterior oropharyngeal erythema  Eyes:      General: No scleral icterus  Pupils: Pupils are equal, round, and reactive to light  Neck:      Thyroid: No thyromegaly  Trachea: No tracheal deviation  Cardiovascular:      Rate and Rhythm: Normal rate and regular rhythm  Heart sounds: Normal heart sounds  Pulmonary:      Effort: Pulmonary effort is normal  No respiratory distress  Breath sounds: Normal breath sounds  Chest:      Chest wall: No tenderness  Abdominal:      General: Bowel sounds are normal       Palpations: Abdomen is soft  There is no mass  Tenderness: There is no abdominal tenderness  Musculoskeletal:         General: Normal range of motion  Cervical back: Normal range of motion and neck supple  Right lower leg: No edema  Left lower leg: No edema  Lymphadenopathy:      Cervical: No cervical adenopathy  Skin:     General: Skin is warm  Neurological:      Mental Status: She is alert and oriented to person, place, and time  Cranial Nerves: No cranial nerve deficit  Psychiatric:         Mood and Affect: Mood normal          Behavior: Behavior normal          Thought Content:  Thought content normal

## 2022-10-10 NOTE — ASSESSMENT & PLAN NOTE
Lab Results   Component Value Date    EGFR 67 08/02/2021    EGFR 56 12/03/2020    EGFR 61 05/06/2020    CREATININE 0 81 08/02/2021    CREATININE 0 94 12/03/2020    CREATININE 0 88 05/06/2020   Renal function is stable    Will continue to monitor

## 2022-12-12 ENCOUNTER — OFFICE VISIT (OUTPATIENT)
Dept: INTERNAL MEDICINE CLINIC | Facility: OTHER | Age: 87
End: 2022-12-12

## 2022-12-12 ENCOUNTER — HOSPITAL ENCOUNTER (OUTPATIENT)
Dept: RADIOLOGY | Facility: IMAGING CENTER | Age: 87
Discharge: HOME | End: 2022-12-12

## 2022-12-12 VITALS
HEIGHT: 63 IN | HEART RATE: 65 BPM | SYSTOLIC BLOOD PRESSURE: 138 MMHG | OXYGEN SATURATION: 98 % | TEMPERATURE: 98.1 F | WEIGHT: 134 LBS | DIASTOLIC BLOOD PRESSURE: 70 MMHG | BODY MASS INDEX: 23.74 KG/M2

## 2022-12-12 DIAGNOSIS — E55.9 VITAMIN D DEFICIENCY: ICD-10-CM

## 2022-12-12 DIAGNOSIS — M25.562 ACUTE PAIN OF LEFT KNEE: ICD-10-CM

## 2022-12-12 DIAGNOSIS — K21.9 GASTROESOPHAGEAL REFLUX DISEASE WITHOUT ESOPHAGITIS: ICD-10-CM

## 2022-12-12 DIAGNOSIS — N18.31 STAGE 3A CHRONIC KIDNEY DISEASE (HCC): ICD-10-CM

## 2022-12-12 DIAGNOSIS — I10 BENIGN ESSENTIAL HYPERTENSION: Primary | ICD-10-CM

## 2022-12-12 RX ORDER — OMEPRAZOLE 20 MG/1
20 CAPSULE, DELAYED RELEASE ORAL DAILY
Qty: 90 CAPSULE | Refills: 1 | Status: SHIPPED | OUTPATIENT
Start: 2022-12-12

## 2022-12-12 NOTE — ASSESSMENT & PLAN NOTE
Will request stat x-ray of left knee  Advised to use Voltaren gel 3 times a day and can take Tylenol as needed

## 2022-12-12 NOTE — PROGRESS NOTES
Assessment/Plan:    Gastroesophageal reflux disease without esophagitis  Continue with present dose of PPI  Also advised for healthy diet  Benign essential hypertension  Blood pressure stable present regimen    Vitamin D deficiency  Continue with vitamin D3 supplementation    Acute pain of left knee  Will request stat x-ray of left knee  Advised to use Voltaren gel 3 times a day and can take Tylenol as needed  Diagnoses and all orders for this visit:    Benign essential hypertension    Gastroesophageal reflux disease without esophagitis  -     omeprazole (PriLOSEC) 20 mg delayed release capsule; Take 1 capsule (20 mg total) by mouth daily    Stage 3a chronic kidney disease (HCC)    Vitamin D deficiency    Acute pain of left knee  -     XR knee 4+ vw left injury; Future            Depression Screening and Follow-up Plan: Patient was screened for depression during today's encounter  They screened negative with a PHQ-2 score of 0  Subjective:          Patient ID: Teodora Ramos is a 81 y o  female  About 2 days ago she fell on the floor at home and hit her left knee  Since then complaining of knee pain  No loss of consciousness no dizziness  Because her recliner was in her way      The following portions of the patient's history were reviewed and updated as appropriate: allergies, current medications, past family history, past medical history, past social history, past surgical history and problem list     Review of Systems   Constitutional: Negative for fatigue and fever  HENT: Negative for congestion, ear discharge, ear pain, postnasal drip, sinus pressure, sore throat, tinnitus and trouble swallowing  Eyes: Negative for discharge, itching and visual disturbance  Respiratory: Negative for cough and shortness of breath  Cardiovascular: Negative for chest pain and palpitations  Gastrointestinal: Negative for abdominal pain, diarrhea, nausea and vomiting     Endocrine: Negative for cold intolerance and polyuria  Genitourinary: Negative for difficulty urinating, dysuria and urgency  Musculoskeletal: Positive for arthralgias  Negative for neck pain  Skin: Negative for rash  Allergic/Immunologic: Negative for environmental allergies  Neurological: Negative for dizziness, weakness and headaches  Psychiatric/Behavioral: Negative for agitation and behavioral problems  The patient is not nervous/anxious            Past Medical History:   Diagnosis Date   • Anxiety     Last Assessed:11/24/2014   • Appendicitis    • Asymptomatic varicose veins     Last Assessed:10/8/2014   • Esophageal reflux     Last Assessed:11/3/2014   • Fracture of rib     Last Assessed:8/7/2015   • Insomnia     Last Assessed:3/18/2014   • Macular degeneration    • Syncope     pt reports recent syncope   • 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)     Last Assessed:3/18/2014         Current Outpatient Medications:   •  acetaminophen (TYLENOL) 500 mg tablet, Take 1 tablet (500 mg total) by mouth every 4 (four) hours as needed (knee and back pain), Disp: 90 tablet, Rfl: 1  •  amLODIPine (NORVASC) 5 mg tablet, Take 1 tablet (5 mg total) by mouth daily, Disp: 90 tablet, Rfl: 1  •  atorvastatin (LIPITOR) 20 mg tablet, Take 1 tablet (20 mg total) by mouth daily, Disp: 90 tablet, Rfl: 1  •  calcium carbonate (OS-VIDA) 600 MG tablet, Take 600 mg by mouth 2 (two) times a day with meals , Disp: , Rfl:   •  Cholecalciferol (VITAMIN D3 PO), Take 1 capsule by mouth daily 2000, Disp: , Rfl:   •  Cranberry-Vitamin C-Probiotic (AZO CRANBERRY PO), Take by mouth daily, Disp: , Rfl:   •  Diclofenac Sodium (VOLTAREN) 1 %, Apply 2 g topically 4 (four) times a day, Disp: 1 Tube, Rfl: 2  •  LORazepam (ATIVAN) 0 5 mg tablet, Take 1 tablet (0 5 mg total) by mouth as needed (help sleep), Disp: 30 tablet, Rfl: 0  •  methylPREDNISolone 4 MG tablet therapy pack, Use as directed on package, Disp: 21 each, Rfl: 0  • Multiple Vitamins-Minerals (PRESERVISION AREDS 2) CAPS, Take 1 capsule by mouth daily, Disp: 90 capsule, Rfl: 1  •  omeprazole (PriLOSEC) 20 mg delayed release capsule, Take 1 capsule (20 mg total) by mouth daily, Disp: 90 capsule, Rfl: 1  •  phenazopyridine (PYRIDIUM) 200 mg tablet, Take 1 tablet (200 mg total) by mouth 3 (three) times a day with meals as needed for bladder pain, Disp: 15 tablet, Rfl: 0  •  risedronate (ACTONEL) 35 mg tablet, Take 1 tablet (35 mg total) by mouth every 7 days with water on empty stomach, nothing by mouth or lie down for next 30 minutes  , Disp: 12 tablet, Rfl: 2    Allergies   Allergen Reactions   • Daypro [Oxaprozin]      Unknown allergic reaction   • Minocycline Other (See Comments)     Loss of taste       Social History   Past Surgical History:   Procedure Laterality Date   • APPENDECTOMY     • CATARACT EXTRACTION, BILATERAL     • HYSTERECTOMY     • INCISIONAL BREAST BIOPSY       Family History   Problem Relation Age of Onset   • Diabetes Mother    • Varicose Veins Mother    • Diabetes Brother        Objective:  /70 (BP Location: Left arm, Patient Position: Sitting, Cuff Size: Adult)   Pulse 65   Temp 98 1 °F (36 7 °C) (Temporal)   Ht 5' 3" (1 6 m)   Wt 60 8 kg (134 lb)   SpO2 98%   BMI 23 74 kg/m²   Body mass index is 23 74 kg/m²  Physical Exam  Constitutional:       Appearance: She is well-developed  She is not ill-appearing or diaphoretic  HENT:      Head: Normocephalic  Right Ear: External ear normal  There is no impacted cerumen  Left Ear: External ear normal  There is no impacted cerumen  Mouth/Throat:      Pharynx: No posterior oropharyngeal erythema  Eyes:      General: No scleral icterus  Pupils: Pupils are equal, round, and reactive to light  Neck:      Thyroid: No thyromegaly  Trachea: No tracheal deviation  Cardiovascular:      Rate and Rhythm: Normal rate and regular rhythm  Heart sounds: Normal heart sounds  Pulmonary:      Effort: Pulmonary effort is normal  No respiratory distress  Breath sounds: Normal breath sounds  Chest:      Chest wall: No tenderness  Abdominal:      General: Bowel sounds are normal       Palpations: Abdomen is soft  There is no mass  Tenderness: There is no abdominal tenderness  Musculoskeletal:         General: Tenderness present  Normal range of motion  Cervical back: Normal range of motion and neck supple  Right lower leg: No edema  Left lower leg: No edema  Comments: Superficial bruise over medial aspect of left knee noted along with mild tenderness  No limitation of range of motion   Lymphadenopathy:      Cervical: No cervical adenopathy  Skin:     General: Skin is warm  Findings: No erythema or rash  Neurological:      Mental Status: She is alert and oriented to person, place, and time  Cranial Nerves: No cranial nerve deficit     Psychiatric:         Mood and Affect: Mood normal          Behavior: Behavior normal

## 2022-12-12 NOTE — ASSESSMENT & PLAN NOTE
Blood pressure stable present regimen How Severe Is Your Eczema?: mild Is This A New Presentation, Or A Follow-Up?: Rash

## 2022-12-22 ENCOUNTER — RA CDI HCC (OUTPATIENT)
Dept: OTHER | Facility: HOSPITAL | Age: 87
End: 2022-12-22

## 2022-12-22 NOTE — PROGRESS NOTES
[facility]  coding opportunities       Chart reviewed, no opportunity found:   [address]        Patients Insurance     Medicare Insurance: [employer]

## 2023-01-09 ENCOUNTER — OFFICE VISIT (OUTPATIENT)
Dept: INTERNAL MEDICINE CLINIC | Age: 88
End: 2023-01-09

## 2023-01-09 VITALS
HEIGHT: 63 IN | OXYGEN SATURATION: 98 % | WEIGHT: 132 LBS | SYSTOLIC BLOOD PRESSURE: 118 MMHG | DIASTOLIC BLOOD PRESSURE: 60 MMHG | HEART RATE: 71 BPM | TEMPERATURE: 98.2 F | BODY MASS INDEX: 23.39 KG/M2

## 2023-01-09 DIAGNOSIS — E78.2 MIXED HYPERLIPIDEMIA: ICD-10-CM

## 2023-01-09 DIAGNOSIS — R10.11 RIGHT UPPER QUADRANT ABDOMINAL PAIN: ICD-10-CM

## 2023-01-09 DIAGNOSIS — E55.9 VITAMIN D DEFICIENCY: ICD-10-CM

## 2023-01-09 DIAGNOSIS — G47.00 INSOMNIA, UNSPECIFIED TYPE: ICD-10-CM

## 2023-01-09 DIAGNOSIS — M81.0 AGE-RELATED OSTEOPOROSIS WITHOUT CURRENT PATHOLOGICAL FRACTURE: ICD-10-CM

## 2023-01-09 DIAGNOSIS — I83.93 ASYMPTOMATIC VARICOSE VEINS OF BOTH LOWER EXTREMITIES: ICD-10-CM

## 2023-01-09 DIAGNOSIS — I10 BENIGN ESSENTIAL HYPERTENSION: ICD-10-CM

## 2023-01-09 DIAGNOSIS — I10 ESSENTIAL HYPERTENSION: ICD-10-CM

## 2023-01-09 DIAGNOSIS — K21.9 GASTROESOPHAGEAL REFLUX DISEASE WITHOUT ESOPHAGITIS: Primary | ICD-10-CM

## 2023-01-09 DIAGNOSIS — N18.31 STAGE 3A CHRONIC KIDNEY DISEASE (HCC): ICD-10-CM

## 2023-01-09 DIAGNOSIS — R73.03 PREDIABETES: ICD-10-CM

## 2023-01-09 PROBLEM — M76.892 LEFT KNEE TENDONITIS: Status: RESOLVED | Noted: 2022-08-09 | Resolved: 2023-01-09

## 2023-01-09 PROBLEM — S91.319A: Status: RESOLVED | Noted: 2019-12-03 | Resolved: 2023-01-09

## 2023-01-09 PROBLEM — R23.4 CRACKED SKIN ON FEET: Status: RESOLVED | Noted: 2019-12-03 | Resolved: 2023-01-09

## 2023-01-09 RX ORDER — LORAZEPAM 0.5 MG/1
0.5 TABLET ORAL AS NEEDED
Qty: 30 TABLET | Refills: 0 | Status: SHIPPED | OUTPATIENT
Start: 2023-01-09

## 2023-01-09 RX ORDER — RISEDRONATE SODIUM 35 MG/1
35 TABLET, FILM COATED ORAL
Qty: 12 TABLET | Refills: 2 | Status: SHIPPED | OUTPATIENT
Start: 2023-01-09

## 2023-01-09 RX ORDER — AMLODIPINE BESYLATE 5 MG/1
5 TABLET ORAL DAILY
Qty: 90 TABLET | Refills: 1 | Status: SHIPPED | OUTPATIENT
Start: 2023-01-09

## 2023-01-09 RX ORDER — ATORVASTATIN CALCIUM 20 MG/1
20 TABLET, FILM COATED ORAL DAILY
Qty: 90 TABLET | Refills: 1 | Status: SHIPPED | OUTPATIENT
Start: 2023-01-09

## 2023-01-09 NOTE — ASSESSMENT & PLAN NOTE
Lab Results   Component Value Date    EGFR 67 08/02/2021    EGFR 56 12/03/2020    EGFR 61 05/06/2020    CREATININE 0 81 08/02/2021    CREATININE 0 94 12/03/2020    CREATININE 0 88 05/06/2020   Renal function is stable    We will continue to monitor

## 2023-01-09 NOTE — PROGRESS NOTES
Assessment/Plan:    Gastroesophageal reflux disease without esophagitis  With present dose of omeprazole and better diet control    Benign essential hypertension  Stable on present regimen  Continue with low-salt diet    CKD (chronic kidney disease) stage 3, GFR 30-59 ml/min [facility])  Lab Results   Component Value Date    EGFR 67 08/02/2021    EGFR 56 12/03/2020    EGFR 61 05/06/2020    CREATININE 0 81 08/02/2021    CREATININE 0 94 12/03/2020    CREATININE 0 88 05/06/2020   Renal function is stable  We will continue to monitor    Mixed hyperlipidemia  Continue with present dose of statin  Continue with local fat diet    Prediabetes  Advised for low-carb/low sugar diet  We will check hemoglobin A1c before next visit    Right upper quadrant abdominal pain  We request stat ultrasound of abdomen  Advised for soft diet for the next few days  Advised her to contact our office if pain get worse  We will follow-up on ultrasound results  Diagnoses and all orders for this visit:    Gastroesophageal reflux disease without esophagitis  -     CBC; Future    Age-related osteoporosis without current pathological fracture  -     risedronate (ACTONEL) 35 mg tablet; Take 1 tablet (35 mg total) by mouth every 7 days with water on empty stomach, nothing by mouth or lie down for next 30 minutes  Mixed hyperlipidemia  -     atorvastatin (LIPITOR) 20 mg tablet; Take 1 tablet (20 mg total) by mouth daily    Essential hypertension  -     amLODIPine (NORVASC) 5 mg tablet; Take 1 tablet (5 mg total) by mouth daily    Insomnia, unspecified type  -     LORazepam (ATIVAN) 0 5 mg tablet; Take 1 tablet (0 5 mg total) by mouth as needed (help sleep)    Stage 3a chronic kidney disease ([facility] [address] )  -     Comprehensive metabolic panel;  Future    Benign essential hypertension    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    Vitamin D deficiency    Prediabetes  -     Hemoglobin A1C; Future    Right upper quadrant abdominal pain  - US abdomen complete; Future               Subjective:          Patient ID: Ashlee Abernathy is a 81 y o  female  Patient is here for regular follow-up  Also have blood work done would like to discuss results  Also complaining of right upper quadrant abdominal discomfort for the last 1 week  Its not really pain is just a discomfort  Complains of occasional constipation  No diarrhea  No fever or chills  The following portions of the patient's history were reviewed and updated as appropriate: allergies, current medications, past family history, past medical history, past social history, past surgical history and problem list     Review of Systems   Constitutional: Negative for fatigue and fever  HENT: Negative for congestion, ear discharge, ear pain, postnasal drip, sinus pressure, sore throat, tinnitus and trouble swallowing  Eyes: Negative for discharge, itching and visual disturbance  Respiratory: Negative for cough and shortness of breath  Cardiovascular: Negative for chest pain and palpitations  Gastrointestinal: Positive for constipation  Negative for abdominal pain, diarrhea, nausea and vomiting  Endocrine: Negative for cold intolerance and polyuria  Genitourinary: Negative for difficulty urinating, dysuria and urgency  Musculoskeletal: Negative for arthralgias and neck pain  Skin: Negative for rash  Allergic/Immunologic: Negative for environmental allergies  Neurological: Negative for dizziness, weakness and headaches  Psychiatric/Behavioral: The patient is not nervous/anxious            Past Medical History:   Diagnosis Date   • Anxiety     Last Assessed:11/24/2014   • Appendicitis    • Asymptomatic varicose veins     Last Assessed:10/8/2014   • Esophageal reflux     Last Assessed:11/3/2014   • Fracture of rib     Last Assessed:8/7/2015   • Insomnia     Last Assessed:3/18/2014   • Macular degeneration    • Syncope     pt reports recent syncope   • 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)     Last Assessed:3/18/2014         Current Outpatient Medications:   •  acetaminophen (TYLENOL) 500 mg tablet, Take 1 tablet (500 mg total) by mouth every 4 (four) hours as needed (knee and back pain), Disp: 90 tablet, Rfl: 1  •  amLODIPine (NORVASC) 5 mg tablet, Take 1 tablet (5 mg total) by mouth daily, Disp: 90 tablet, Rfl: 1  •  atorvastatin (LIPITOR) 20 mg tablet, Take 1 tablet (20 mg total) by mouth daily, Disp: 90 tablet, Rfl: 1  •  calcium carbonate (OS-VIDA) 600 MG tablet, Take 600 mg by mouth 2 (two) times a day with meals , Disp: , Rfl:   •  Cholecalciferol (VITAMIN D3 PO), Take 1 capsule by mouth daily 2000, Disp: , Rfl:   •  Cranberry-Vitamin C-Probiotic (AZO CRANBERRY PO), Take by mouth daily, Disp: , Rfl:   •  Diclofenac Sodium (VOLTAREN) 1 %, Apply 2 g topically 4 (four) times a day, Disp: 1 Tube, Rfl: 2  •  LORazepam (ATIVAN) 0 5 mg tablet, Take 1 tablet (0 5 mg total) by mouth as needed (help sleep), Disp: 30 tablet, Rfl: 0  •  Multiple Vitamins-Minerals (PRESERVISION AREDS 2) CAPS, Take 1 capsule by mouth daily, Disp: 90 capsule, Rfl: 1  •  omeprazole (PriLOSEC) 20 mg delayed release capsule, Take 1 capsule (20 mg total) by mouth daily, Disp: 90 capsule, Rfl: 1  •  risedronate (ACTONEL) 35 mg tablet, Take 1 tablet (35 mg total) by mouth every 7 days with water on empty stomach, nothing by mouth or lie down for next 30 minutes  , Disp: 12 tablet, Rfl: 2  •  methylPREDNISolone 4 MG tablet therapy pack, Use as directed on package (Patient not taking: Reported on 1/9/2023), Disp: 21 each, Rfl: 0  •  phenazopyridine (PYRIDIUM) 200 mg tablet, Take 1 tablet (200 mg total) by mouth 3 (three) times a day with meals as needed for bladder pain (Patient not taking: Reported on 1/9/2023), Disp: 15 tablet, Rfl: 0    Allergies   Allergen Reactions   • Daypro [Oxaprozin]      Unknown allergic reaction   • Minocycline Other (See Comments)     Loss of taste       Social History   Past Surgical History:   Procedure Laterality Date   • APPENDECTOMY     • CATARACT EXTRACTION, BILATERAL     • HYSTERECTOMY     • INCISIONAL BREAST BIOPSY       Family History   Problem Relation Age of Onset   • Diabetes Mother    • Varicose Veins Mother    • Diabetes Brother        Objective:  /60 (BP Location: Left arm, Patient Position: Sitting, Cuff Size: Standard)   Pulse 71   Temp 98 2 °F (36 8 °C) (Temporal)   Ht 5' 3" (1 6 m)   Wt 59 9 kg (132 lb)   SpO2 98%   BMI 23 38 kg/m²   Body mass index is 23 38 kg/m²  Physical Exam  Constitutional:       Appearance: She is well-developed  She is not ill-appearing  HENT:      Head: Normocephalic  Right Ear: Ear canal and external ear normal  There is no impacted cerumen  Left Ear: Ear canal and external ear normal  There is no impacted cerumen  Nose: No rhinorrhea  Mouth/Throat:      Pharynx: No posterior oropharyngeal erythema  Eyes:      General: No scleral icterus  Pupils: Pupils are equal, round, and reactive to light  Neck:      Thyroid: No thyromegaly  Trachea: No tracheal deviation  Cardiovascular:      Rate and Rhythm: Normal rate and regular rhythm  Heart sounds: Normal heart sounds  Pulmonary:      Effort: Pulmonary effort is normal  No respiratory distress  Breath sounds: Normal breath sounds  Chest:      Chest wall: No tenderness  Abdominal:      General: Bowel sounds are normal       Palpations: Abdomen is soft  There is no mass  Tenderness: There is abdominal tenderness  Hernia: No hernia is present  Comments: Very mild tenderness over right upper quadrant on deep palpation noted  Musculoskeletal:         General: Normal range of motion  Cervical back: Normal range of motion and neck supple  Right lower leg: No edema  Left lower leg: No edema  Lymphadenopathy:      Cervical: No cervical adenopathy  Skin:     General: Skin is warm  Neurological:      Mental Status: She is alert and oriented to person, place, and time  Cranial Nerves: No cranial nerve deficit     Psychiatric:         Mood and Affect: Mood normal          Behavior: Behavior normal

## 2023-01-09 NOTE — ASSESSMENT & PLAN NOTE
We request stat ultrasound of abdomen  Advised for soft diet for the next few days  Advised her to contact our office if pain get worse  We will follow-up on ultrasound results

## 2023-01-11 ENCOUNTER — HOSPITAL ENCOUNTER (OUTPATIENT)
Dept: RADIOLOGY | Facility: IMAGING CENTER | Age: 88
Discharge: HOME | End: 2023-01-11

## 2023-01-11 DIAGNOSIS — R10.11 RIGHT UPPER QUADRANT ABDOMINAL PAIN: ICD-10-CM

## 2023-02-23 DIAGNOSIS — E78.2 MIXED HYPERLIPIDEMIA: ICD-10-CM

## 2023-02-23 RX ORDER — ATORVASTATIN CALCIUM 20 MG/1
20 TABLET, FILM COATED ORAL DAILY
Qty: 90 TABLET | Refills: 1 | Status: CANCELLED | OUTPATIENT
Start: 2023-02-23

## 2023-03-24 ENCOUNTER — TELEPHONE (OUTPATIENT)
Dept: INTERNAL MEDICINE CLINIC | Facility: OTHER | Age: 88
End: 2023-03-24

## 2023-03-24 NOTE — TELEPHONE ENCOUNTER
Pt walked in to [facility] office stating that she has a "funny feeling in her head "  No appts avail at [facility] or   Pt does not express feeling any pain or dizziness, not falling or bumping her head  Pt was unable to explain in detail what she ment by "funny feeling "   She just wanted us to "give her an xray" because she doesn't know if this could be life-threatening  She also asked if someone could take her bp and Latia Thompson did, 154/70  Latia Thompson also asked her neuro assessment questions, A &O x4  Pt was recommended again to the ER for proper assessment and not wait until her upcoming f/u appt w Dr Raymond Coulter  Pt was asked if she had transportation to the [facility] and she stated that her  was driving

## 2023-03-28 ENCOUNTER — RA CDI HCC (OUTPATIENT)
Dept: OTHER | Facility: HOSPITAL | Age: 88
End: 2023-03-28

## 2023-03-28 NOTE — PROGRESS NOTES
[facility]  coding opportunities       Chart reviewed, no opportunity found:   [address]        Patients Insurance     Medicare Insurance: [employer]

## 2023-04-04 ENCOUNTER — OFFICE VISIT (OUTPATIENT)
Dept: INTERNAL MEDICINE CLINIC | Facility: OTHER | Age: 88
End: 2023-04-04

## 2023-04-04 VITALS
SYSTOLIC BLOOD PRESSURE: 124 MMHG | WEIGHT: 131.6 LBS | HEART RATE: 73 BPM | BODY MASS INDEX: 23.32 KG/M2 | DIASTOLIC BLOOD PRESSURE: 62 MMHG | OXYGEN SATURATION: 99 % | HEIGHT: 63 IN | TEMPERATURE: 98.2 F

## 2023-04-04 DIAGNOSIS — R73.03 PREDIABETES: ICD-10-CM

## 2023-04-04 DIAGNOSIS — E55.9 VITAMIN D DEFICIENCY: ICD-10-CM

## 2023-04-04 DIAGNOSIS — I83.93 ASYMPTOMATIC VARICOSE VEINS OF BOTH LOWER EXTREMITIES: ICD-10-CM

## 2023-04-04 DIAGNOSIS — N18.31 STAGE 3A CHRONIC KIDNEY DISEASE (HCC): ICD-10-CM

## 2023-04-04 DIAGNOSIS — M81.0 AGE-RELATED OSTEOPOROSIS WITHOUT CURRENT PATHOLOGICAL FRACTURE: Primary | ICD-10-CM

## 2023-04-04 PROBLEM — M25.562 ACUTE PAIN OF LEFT KNEE: Status: RESOLVED | Noted: 2021-08-02 | Resolved: 2023-04-04

## 2023-04-04 PROBLEM — R10.11 RIGHT UPPER QUADRANT ABDOMINAL PAIN: Status: RESOLVED | Noted: 2023-01-09 | Resolved: 2023-04-04

## 2023-04-04 PROBLEM — R42 POSTURAL DIZZINESS WITH PRESYNCOPE: Status: RESOLVED | Noted: 2020-10-06 | Resolved: 2023-04-04

## 2023-04-04 PROBLEM — R30.0 DYSURIA: Status: RESOLVED | Noted: 2021-09-07 | Resolved: 2023-04-04

## 2023-04-04 PROBLEM — M25.571 ACUTE RIGHT ANKLE PAIN: Status: RESOLVED | Noted: 2021-08-02 | Resolved: 2023-04-04

## 2023-04-04 PROBLEM — R42 LIGHTHEADEDNESS: Status: RESOLVED | Noted: 2020-10-06 | Resolved: 2023-04-04

## 2023-04-04 PROBLEM — R19.7 INTERMITTENT DIARRHEA: Status: RESOLVED | Noted: 2020-10-22 | Resolved: 2023-04-04

## 2023-04-04 PROBLEM — R55 VASOVAGAL SYNCOPE: Status: RESOLVED | Noted: 2020-01-23 | Resolved: 2023-04-04

## 2023-04-04 PROBLEM — R55 POSTURAL DIZZINESS WITH PRESYNCOPE: Status: RESOLVED | Noted: 2020-10-06 | Resolved: 2023-04-04

## 2023-04-04 NOTE — PROGRESS NOTES
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  Continue with compression stocking  Age-related osteoporosis without current pathological fracture  Continue with Actonel 35 mg daily  Continue with calcium, vitamin D3 and weightbearing exercises  She is due for DEXA scan  CKD (chronic kidney disease) stage 3, GFR 30-59 ml/min ([facility])  Lab Results   Component Value Date    EGFR 67 08/02/2021    EGFR 56 12/03/2020    EGFR 61 05/06/2020    CREATININE 0 81 08/02/2021    CREATININE 0 94 12/03/2020    CREATININE 0 88 05/06/2020   She was unable to do blood work prior to this visit  Advised her to do it as soon as possible  We will continue to monitor  Continue with adequate hydration    Vitamin D deficiency  Continue with vitamin D3 supplementation    Prediabetes  Continue with low sugar/low-carb diet  She is overdue for hemoglobin A1c  Diagnoses and all orders for this visit:    Age-related osteoporosis without current pathological fracture  -     DXA bone density spine hip and pelvis; Future    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    Stage 3a chronic kidney disease (HCC)    Vitamin D deficiency    Prediabetes            Depression Screening and Follow-up Plan: Patient was screened for depression during today's encounter  They screened negative with a PHQ-2 score of 0  Subjective:          Patient ID: Neo Quiñonez is a 81 y o  female  Patient is here for regular follow-up  She was also supposed to have blood work done but was not able to do it        The following portions of the patient's history were reviewed and updated as appropriate: allergies, current medications, past family history, past medical history, past social history, past surgical history and problem list     Review of Systems   Constitutional: Negative for fatigue and fever  HENT: Negative for congestion, ear discharge, ear pain, postnasal drip, sinus pressure, sore throat, tinnitus and trouble swallowing  Eyes: Negative for discharge, itching and visual disturbance  Respiratory: Negative for cough and shortness of breath  Cardiovascular: Negative for chest pain and palpitations  Gastrointestinal: Negative for abdominal pain, diarrhea, nausea and vomiting  Endocrine: Negative for cold intolerance and polyuria  Genitourinary: Negative for difficulty urinating, dysuria and urgency  Musculoskeletal: Positive for arthralgias  Negative for neck pain  Skin: Negative for rash  Allergic/Immunologic: Negative for environmental allergies  Neurological: Negative for dizziness, weakness and headaches  Psychiatric/Behavioral: Negative for agitation and behavioral problems  The patient is not nervous/anxious            Past Medical History:   Diagnosis Date   • Anxiety     Last Assessed:11/24/2014   • Appendicitis    • Asymptomatic varicose veins     Last Assessed:10/8/2014   • Esophageal reflux     Last Assessed:11/3/2014   • Fracture of rib     Last Assessed:8/7/2015   • Insomnia     Last Assessed:3/18/2014   • Macular degeneration    • Syncope     pt reports recent syncope   • 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)     Last Assessed:3/18/2014         Current Outpatient Medications:   •  acetaminophen (TYLENOL) 500 mg tablet, Take 1 tablet (500 mg total) by mouth every 4 (four) hours as needed (knee and back pain), Disp: 90 tablet, Rfl: 1  •  amLODIPine (NORVASC) 5 mg tablet, Take 1 tablet (5 mg total) by mouth daily, Disp: 90 tablet, Rfl: 1  •  atorvastatin (LIPITOR) 20 mg tablet, Take 1 tablet (20 mg total) by mouth daily, Disp: 90 tablet, Rfl: 1  •  calcium carbonate (OS-VIDA) 600 MG tablet, Take 600 mg "by mouth 2 (two) times a day with meals , Disp: , Rfl:   •  Cholecalciferol (VITAMIN D3 PO), Take 1 capsule by mouth daily 2000, Disp: , Rfl:   •  Cranberry-Vitamin C-Probiotic (AZO CRANBERRY PO), Take by mouth daily, Disp: , Rfl:   •  Diclofenac Sodium (VOLTAREN) 1 %, Apply 2 g topically 4 (four) times a day, Disp: 1 Tube, Rfl: 2  •  LORazepam (ATIVAN) 0 5 mg tablet, Take 1 tablet (0 5 mg total) by mouth as needed (help sleep), Disp: 30 tablet, Rfl: 0  •  methylPREDNISolone 4 MG tablet therapy pack, Use as directed on package, Disp: 21 each, Rfl: 0  •  Multiple Vitamins-Minerals (PRESERVISION AREDS 2) CAPS, Take 1 capsule by mouth daily, Disp: 90 capsule, Rfl: 1  •  omeprazole (PriLOSEC) 20 mg delayed release capsule, Take 1 capsule (20 mg total) by mouth daily, Disp: 90 capsule, Rfl: 1  •  risedronate (ACTONEL) 35 mg tablet, Take 1 tablet (35 mg total) by mouth every 7 days with water on empty stomach, nothing by mouth or lie down for next 30 minutes  , Disp: 12 tablet, Rfl: 2    Allergies   Allergen Reactions   • Daypro [Oxaprozin]      Unknown allergic reaction   • Minocycline Other (See Comments)     Loss of taste       Social History   Past Surgical History:   Procedure Laterality Date   • APPENDECTOMY     • CATARACT EXTRACTION, BILATERAL     • HYSTERECTOMY     • INCISIONAL BREAST BIOPSY       Family History   Problem Relation Age of Onset   • Diabetes Mother    • Varicose Veins Mother    • Diabetes Brother        Objective:  /62 (BP Location: Left arm, Patient Position: Sitting, Cuff Size: Adult)   Pulse 73   Temp 98 2 °F (36 8 °C)   Ht 5' 3\" (1 6 m)   Wt 59 7 kg (131 lb 9 6 oz)   SpO2 99%   BMI 23 31 kg/m²   Body mass index is 23 31 kg/m²  Physical Exam  Constitutional:       Appearance: She is well-developed  She is not ill-appearing or diaphoretic  HENT:      Head: Normocephalic  Right Ear: External ear normal       Left Ear: External ear normal       Nose: No rhinorrhea        " Mouth/Throat:      Pharynx: No posterior oropharyngeal erythema  Eyes:      General: No scleral icterus  Pupils: Pupils are equal, round, and reactive to light  Neck:      Thyroid: No thyromegaly  Trachea: No tracheal deviation  Cardiovascular:      Rate and Rhythm: Normal rate and regular rhythm  Heart sounds: Normal heart sounds  No murmur heard  Comments: Bilateral lower extremity varicose veins noted  Pulmonary:      Effort: Pulmonary effort is normal  No respiratory distress  Breath sounds: Normal breath sounds  Chest:      Chest wall: No tenderness  Abdominal:      General: Bowel sounds are normal       Palpations: Abdomen is soft  There is no mass  Tenderness: There is no abdominal tenderness  Musculoskeletal:         General: Normal range of motion  Cervical back: Normal range of motion and neck supple  Right lower leg: No edema  Left lower leg: No edema  Lymphadenopathy:      Cervical: No cervical adenopathy  Skin:     General: Skin is warm  Findings: No erythema  Neurological:      Mental Status: She is alert and oriented to person, place, and time  Cranial Nerves: No cranial nerve deficit     Psychiatric:         Mood and Affect: Mood normal          Behavior: Behavior normal

## 2023-04-04 NOTE — ASSESSMENT & PLAN NOTE
Lab Results   Component Value Date    EGFR 67 08/02/2021    EGFR 56 12/03/2020    EGFR 61 05/06/2020    CREATININE 0 81 08/02/2021    CREATININE 0 94 12/03/2020    CREATININE 0 88 05/06/2020   She was unable to do blood work prior to this visit  Advised her to do it as soon as possible  We will continue to monitor    Continue with adequate hydration

## 2023-04-04 NOTE — ASSESSMENT & PLAN NOTE
Continue with Actonel 35 mg daily  Continue with calcium, vitamin D3 and weightbearing exercises  She is due for DEXA scan

## 2023-04-05 LAB — HBA1C MFR BLD HPLC: 6.3 %

## 2023-04-07 DIAGNOSIS — K21.9 GASTROESOPHAGEAL REFLUX DISEASE WITHOUT ESOPHAGITIS: ICD-10-CM

## 2023-04-07 DIAGNOSIS — E78.2 MIXED HYPERLIPIDEMIA: ICD-10-CM

## 2023-04-07 DIAGNOSIS — I10 ESSENTIAL HYPERTENSION: ICD-10-CM

## 2023-04-07 DIAGNOSIS — G47.00 INSOMNIA, UNSPECIFIED TYPE: ICD-10-CM

## 2023-04-07 DIAGNOSIS — M81.0 AGE-RELATED OSTEOPOROSIS WITHOUT CURRENT PATHOLOGICAL FRACTURE: ICD-10-CM

## 2023-04-07 RX ORDER — ATORVASTATIN CALCIUM 20 MG/1
20 TABLET, FILM COATED ORAL DAILY
Qty: 90 TABLET | Refills: 1 | Status: CANCELLED | OUTPATIENT
Start: 2023-04-07

## 2023-04-07 RX ORDER — OMEPRAZOLE 20 MG/1
20 CAPSULE, DELAYED RELEASE ORAL DAILY
Qty: 90 CAPSULE | Refills: 1 | Status: SHIPPED | OUTPATIENT
Start: 2023-04-07

## 2023-04-07 RX ORDER — AMLODIPINE BESYLATE 5 MG/1
5 TABLET ORAL DAILY
Qty: 90 TABLET | Refills: 1 | Status: CANCELLED | OUTPATIENT
Start: 2023-04-07

## 2023-04-07 RX ORDER — LORAZEPAM 0.5 MG/1
0.5 TABLET ORAL AS NEEDED
Qty: 30 TABLET | Refills: 0 | Status: SHIPPED | OUTPATIENT
Start: 2023-04-07

## 2023-04-07 RX ORDER — RISEDRONATE SODIUM 35 MG/1
35 TABLET, FILM COATED ORAL
Qty: 12 TABLET | Refills: 2 | Status: SHIPPED | OUTPATIENT
Start: 2023-04-07

## 2023-04-24 ENCOUNTER — TELEPHONE (OUTPATIENT)
Dept: INTERNAL MEDICINE CLINIC | Facility: OTHER | Age: 88
End: 2023-04-24

## 2023-04-24 NOTE — TELEPHONE ENCOUNTER
Patient called and stated that starting yesterday she started with R sided chest pain/discomfort  Patient described it more as an ache  No appointments available today with any provider  I recommended if patient was having chest pain that she should be evaluated at the ER but patient is refusing  Patient would like an appointment with us  Patient has appointment in [facility] 4/25 @ 0900 with Dr Olson Render

## 2023-04-25 ENCOUNTER — OFFICE VISIT (OUTPATIENT)
Dept: INTERNAL MEDICINE CLINIC | Facility: OTHER | Age: 88
End: 2023-04-25

## 2023-04-25 VITALS
DIASTOLIC BLOOD PRESSURE: 70 MMHG | OXYGEN SATURATION: 96 % | WEIGHT: 129 LBS | TEMPERATURE: 96.4 F | SYSTOLIC BLOOD PRESSURE: 136 MMHG | HEART RATE: 67 BPM | BODY MASS INDEX: 22.86 KG/M2 | HEIGHT: 63 IN

## 2023-04-25 DIAGNOSIS — I10 BENIGN ESSENTIAL HYPERTENSION: ICD-10-CM

## 2023-04-25 DIAGNOSIS — E78.2 MIXED HYPERLIPIDEMIA: ICD-10-CM

## 2023-04-25 DIAGNOSIS — R07.9 CHEST PAIN DUE TO GASTROINTESTINAL REFLUX DISEASE: ICD-10-CM

## 2023-04-25 DIAGNOSIS — K21.9 GASTROESOPHAGEAL REFLUX DISEASE WITHOUT ESOPHAGITIS: ICD-10-CM

## 2023-04-25 DIAGNOSIS — R07.9 CHEST PAIN, UNSPECIFIED TYPE: Primary | ICD-10-CM

## 2023-04-25 DIAGNOSIS — M81.0 AGE-RELATED OSTEOPOROSIS WITHOUT CURRENT PATHOLOGICAL FRACTURE: ICD-10-CM

## 2023-04-25 DIAGNOSIS — N18.31 STAGE 3A CHRONIC KIDNEY DISEASE (HCC): ICD-10-CM

## 2023-04-25 DIAGNOSIS — K21.9 CHEST PAIN DUE TO GASTROINTESTINAL REFLUX DISEASE: ICD-10-CM

## 2023-04-25 RX ORDER — OMEPRAZOLE 40 MG/1
40 CAPSULE, DELAYED RELEASE ORAL DAILY
Qty: 90 CAPSULE | Refills: 1 | Status: SHIPPED | OUTPATIENT
Start: 2023-04-25

## 2023-04-25 NOTE — PROGRESS NOTES
Assessment/Plan:    Gastroesophageal reflux disease without esophagitis  Advised for better diet control  Will increase omeprazole 40 mg daily  Benign essential hypertension  Blood pressure stable on present regimen    CKD (chronic kidney disease) stage 3, GFR 30-59 ml/min [facility])  Lab Results   Component Value Date    EGFR 67 08/02/2021    EGFR 56 12/03/2020    EGFR 61 05/06/2020    CREATININE 0 81 08/02/2021    CREATININE 0 94 12/03/2020    CREATININE 0 88 05/06/2020   Renal function is stable  We will continue to monitor    Chest pain due to gastrointestinal reflux disease  Her chest pain symptoms are more compatible with GERD symptoms  Probably Actonel was also making it worse  Advised her to discontinue for now  Increase omeprazole 40 mg daily  EKG done in office today shows normal sinus rhythm  No significant ST-T wave changes  No acute changes patient does have cardiac stress test done in 2017 which was unremarkable    Age-related osteoporosis without current pathological fracture  We will discontinue Actonel 35 mg daily due to worsening GERD symptoms  We will try to get prior authorization for Prolia injection and patient is in agreement  Continue with calcium, vitamin D3 and weightbearing exercises  Diagnoses and all orders for this visit:    Chest pain, unspecified type  -     POCT ECG    Gastroesophageal reflux disease without esophagitis  -     omeprazole (PriLOSEC) 40 MG capsule; Take 1 capsule (40 mg total) by mouth daily    Benign essential hypertension    Stage 3a chronic kidney disease (HCC)    Chest pain due to gastrointestinal reflux disease    Mixed hyperlipidemia    Age-related osteoporosis without current pathological fracture            Depression Screening and Follow-up Plan: Patient was screened for depression during today's encounter  They screened negative with a PHQ-2 score of 0  Subjective:          Patient ID: Sugar Jovel is a 81 y o  female      Came to office with a complaint for anterior chest pain/epigastric off-and-on  Started on Monday morning after eating [employer] and drinking wine  No radiation  Lasted only for 5 minutes  Does complain of worsening GERD symptoms  The following portions of the patient's history were reviewed and updated as appropriate: allergies, current medications, past family history, past medical history, past social history, past surgical history and problem list     Review of Systems   Constitutional: Negative for fatigue and fever  HENT: Negative for congestion, ear discharge, ear pain, postnasal drip, sinus pressure, sore throat, tinnitus and trouble swallowing  Eyes: Negative for discharge, itching and visual disturbance  Respiratory: Negative for cough and shortness of breath  Cardiovascular: Positive for chest pain  Negative for palpitations  Gastrointestinal: Positive for abdominal pain  Negative for diarrhea, nausea and vomiting  Endocrine: Negative for cold intolerance and polyuria  Genitourinary: Negative for difficulty urinating, dysuria and urgency  Musculoskeletal: Negative for arthralgias and neck pain  Skin: Negative for rash  Allergic/Immunologic: Negative for environmental allergies  Neurological: Negative for dizziness, weakness and headaches  Psychiatric/Behavioral: The patient is not nervous/anxious            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)     Last Assessed:3/18/2014         Current Outpatient Medications:   •  acetaminophen (TYLENOL) 500 mg tablet, Take 1 tablet "(500 mg total) by mouth every 4 (four) hours as needed (knee and back pain), Disp: 90 tablet, Rfl: 1  •  amLODIPine (NORVASC) 5 mg tablet, Take 1 tablet (5 mg total) by mouth daily, Disp: 90 tablet, Rfl: 1  •  atorvastatin (LIPITOR) 20 mg tablet, Take 1 tablet (20 mg total) by mouth daily, Disp: 90 tablet, Rfl: 1  •  calcium carbonate (OS-VIDA) 600 MG tablet, Take 600 mg by mouth 2 (two) times a day with meals , Disp: , Rfl:   •  Cholecalciferol (VITAMIN D3 PO), Take 1 capsule by mouth daily 2000, Disp: , Rfl:   •  Cranberry-Vitamin C-Probiotic (AZO CRANBERRY PO), Take by mouth daily, Disp: , Rfl:   •  Diclofenac Sodium (VOLTAREN) 1 %, Apply 2 g topically 4 (four) times a day, Disp: 1 Tube, Rfl: 2  •  LORazepam (ATIVAN) 0 5 mg tablet, Take 1 tablet (0 5 mg total) by mouth as needed (help sleep), Disp: 30 tablet, Rfl: 0  •  Multiple Vitamins-Minerals (PRESERVISION AREDS 2) CAPS, Take 1 capsule by mouth daily, Disp: 90 capsule, Rfl: 1  •  omeprazole (PriLOSEC) 40 MG capsule, Take 1 capsule (40 mg total) by mouth daily, Disp: 90 capsule, Rfl: 1  •  methylPREDNISolone 4 MG tablet therapy pack, Use as directed on package (Patient not taking: Reported on 4/25/2023), Disp: 21 each, Rfl: 0    Allergies   Allergen Reactions   • Daypro [Oxaprozin]      Unknown allergic reaction   • Minocycline Other (See Comments)     Loss of taste       Social History   Past Surgical History:   Procedure Laterality Date   • APPENDECTOMY     • CATARACT EXTRACTION, BILATERAL     • HYSTERECTOMY     • INCISIONAL BREAST BIOPSY       Family History   Problem Relation Age of Onset   • Diabetes Mother    • Varicose Veins Mother    • Diabetes Brother        Objective:  /70   Pulse 67   Temp (!) 96 4 °F (35 8 °C) (Temporal)   Ht 5' 3\" (1 6 m)   Wt 58 5 kg (129 lb)   SpO2 96%   BMI 22 85 kg/m²   Body mass index is 22 85 kg/m²  Physical Exam  Constitutional:       Appearance: She is well-developed  She is not ill-appearing or diaphoretic   " HENT:      Head: Normocephalic  Right Ear: Ear canal and external ear normal       Left Ear: Ear canal and external ear normal  There is no impacted cerumen  Mouth/Throat:      Pharynx: No posterior oropharyngeal erythema  Eyes:      General: No scleral icterus  Pupils: Pupils are equal, round, and reactive to light  Neck:      Thyroid: No thyromegaly  Trachea: No tracheal deviation  Cardiovascular:      Rate and Rhythm: Normal rate and regular rhythm  Heart sounds: Normal heart sounds  No murmur heard  Pulmonary:      Effort: Pulmonary effort is normal  No respiratory distress  Breath sounds: Normal breath sounds  Chest:      Chest wall: No tenderness  Abdominal:      General: Bowel sounds are normal       Palpations: Abdomen is soft  There is no mass  Tenderness: There is no abdominal tenderness  Musculoskeletal:         General: Normal range of motion  Cervical back: Normal range of motion and neck supple  Right lower leg: No edema  Left lower leg: No edema  Lymphadenopathy:      Cervical: No cervical adenopathy  Skin:     General: Skin is warm  Neurological:      Mental Status: She is alert and oriented to person, place, and time  Cranial Nerves: No cranial nerve deficit     Psychiatric:         Mood and Affect: Mood normal          Behavior: Behavior normal

## 2023-04-25 NOTE — ASSESSMENT & PLAN NOTE
We will discontinue Actonel 35 mg daily due to worsening GERD symptoms  We will try to get prior authorization for Prolia injection and patient is in agreement  Continue with calcium, vitamin D3 and weightbearing exercises

## 2023-04-25 NOTE — ASSESSMENT & PLAN NOTE
Her chest pain symptoms are more compatible with GERD symptoms  Probably Actonel was also making it worse  Advised her to discontinue for now  Increase omeprazole 40 mg daily  EKG done in office today shows normal sinus rhythm  No significant ST-T wave changes    No acute changes patient does have cardiac stress test done in 2017 which was unremarkable

## 2023-04-26 ENCOUNTER — TELEPHONE (OUTPATIENT)
Dept: INTERNAL MEDICINE CLINIC | Facility: OTHER | Age: 88
End: 2023-04-26

## 2023-04-26 ENCOUNTER — HOSPITAL ENCOUNTER (OUTPATIENT)
Dept: RADIOLOGY | Facility: IMAGING CENTER | Age: 88
Discharge: HOME | End: 2023-04-26

## 2023-04-26 DIAGNOSIS — G93.9 BRAIN LESION: Primary | ICD-10-CM

## 2023-04-26 DIAGNOSIS — R42 DIZZINESS: ICD-10-CM

## 2023-04-26 NOTE — TELEPHONE ENCOUNTER
CT BRAIN - WITHOUT CONTRAST is showing significant findings per  Radiology  Please address since Dr Carol Levin is not in the office  TT sent to [facility]

## 2023-04-26 NOTE — TELEPHONE ENCOUNTER
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

## 2023-05-10 ENCOUNTER — HOSPITAL ENCOUNTER (OUTPATIENT)
Dept: RADIOLOGY | Facility: IMAGING CENTER | Age: 88
Discharge: HOME | End: 2023-05-10

## 2023-05-10 DIAGNOSIS — G93.9 BRAIN LESION: ICD-10-CM

## 2023-05-10 RX ADMIN — GADOBUTROL 5 ML: 604.72 INJECTION INTRAVENOUS at 08:58

## 2023-06-26 ENCOUNTER — OFFICE VISIT (OUTPATIENT)
Dept: INTERNAL MEDICINE CLINIC | Facility: OTHER | Age: 88
End: 2023-06-26
Payer: COMMERCIAL

## 2023-06-26 ENCOUNTER — HOSPITAL ENCOUNTER (OUTPATIENT)
Dept: RADIOLOGY | Facility: IMAGING CENTER | Age: 88
Discharge: HOME | End: 2023-06-26
Payer: COMMERCIAL

## 2023-06-26 VITALS
OXYGEN SATURATION: 98 % | BODY MASS INDEX: 22.68 KG/M2 | DIASTOLIC BLOOD PRESSURE: 62 MMHG | WEIGHT: 128 LBS | HEIGHT: 63 IN | HEART RATE: 60 BPM | SYSTOLIC BLOOD PRESSURE: 134 MMHG | TEMPERATURE: 98.2 F

## 2023-06-26 DIAGNOSIS — E78.2 MIXED HYPERLIPIDEMIA: ICD-10-CM

## 2023-06-26 DIAGNOSIS — E55.9 VITAMIN D DEFICIENCY: ICD-10-CM

## 2023-06-26 DIAGNOSIS — M25.511 ACUTE PAIN OF RIGHT SHOULDER: ICD-10-CM

## 2023-06-26 DIAGNOSIS — M81.0 AGE-RELATED OSTEOPOROSIS WITHOUT CURRENT PATHOLOGICAL FRACTURE: ICD-10-CM

## 2023-06-26 DIAGNOSIS — K21.9 GASTROESOPHAGEAL REFLUX DISEASE WITHOUT ESOPHAGITIS: ICD-10-CM

## 2023-06-26 DIAGNOSIS — I10 BENIGN ESSENTIAL HYPERTENSION: ICD-10-CM

## 2023-06-26 DIAGNOSIS — R73.03 PREDIABETES: ICD-10-CM

## 2023-06-26 DIAGNOSIS — N18.31 STAGE 3A CHRONIC KIDNEY DISEASE (HCC): ICD-10-CM

## 2023-06-26 DIAGNOSIS — Z00.00 MEDICARE ANNUAL WELLNESS VISIT, SUBSEQUENT: Primary | ICD-10-CM

## 2023-06-26 PROCEDURE — 73030 X-RAY EXAM OF SHOULDER: CPT

## 2023-06-26 PROCEDURE — 99214 OFFICE O/P EST MOD 30 MIN: CPT | Performed by: INTERNAL MEDICINE

## 2023-06-26 PROCEDURE — G0439 PPPS, SUBSEQ VISIT: HCPCS | Performed by: INTERNAL MEDICINE

## 2023-06-26 NOTE — ASSESSMENT & PLAN NOTE
Lab Results   Component Value Date    EGFR 67 08/02/2021    EGFR 56 12/03/2020    EGFR 61 05/06/2020    CREATININE 0 81 08/02/2021    CREATININE 0 94 12/03/2020    CREATININE 0 88 05/06/2020   Renal function is stable  Advised for adequate hydration    We will continue to monitor

## 2023-06-26 NOTE — ASSESSMENT & PLAN NOTE
Bone deformity felt at acromioclavicular junction likely underlying degenerative arthritis  Will request stat x-ray of right shoulder for further evaluation

## 2023-06-26 NOTE — ASSESSMENT & PLAN NOTE
Because of her GERD symptoms which got worse with the bisphosphonates so we discontinued  We will get approval through insurance for Prolia injection    Continue with calcium, vitamin D3 and weightbearing exercise

## 2023-06-26 NOTE — PROGRESS NOTES
Assessment and Plan:     Problem List Items Addressed This Visit        Digestive    Gastroesophageal reflux disease without esophagitis     Continue with omeprazole 40 mg daily  Continue with healthy diet         Relevant Orders    CBC       Cardiovascular and Mediastinum    Benign essential hypertension     Blood pressure is stable on present regimen  Musculoskeletal and Integument    Age-related osteoporosis without current pathological fracture     Because of her GERD symptoms which got worse with the bisphosphonates so we discontinued  We will get approval through insurance for Prolia injection  Continue with calcium, vitamin D3 and weightbearing exercise            Genitourinary    CKD (chronic kidney disease) stage 3, GFR 30-59 ml/min [facility])     Lab Results   Component Value Date    EGFR 67 08/02/2021    EGFR 56 12/03/2020    EGFR 61 05/06/2020    CREATININE 0 81 08/02/2021    CREATININE 0 94 12/03/2020    CREATININE 0 88 05/06/2020   Renal function is stable  Advised for adequate hydration  We will continue to monitor         Relevant Orders    Comprehensive metabolic panel       Other    Mixed hyperlipidemia    Relevant Orders    Lipid Panel with Direct LDL reflex    Vitamin D deficiency     Continue with vitamin D3 supplementation         Prediabetes    Relevant Orders    Hemoglobin A1C    Acute pain of right shoulder     Bone deformity felt at acromioclavicular junction likely underlying degenerative arthritis  Will request stat x-ray of right shoulder for further evaluation  Relevant Orders    XR shoulder 2+ vw right   Other Visit Diagnoses     Medicare annual wellness visit, subsequent    -  Primary          Depression Screening and Follow-up Plan: Patient was screened for depression during today's encounter  They screened negative with a PHQ-2 score of 0        Preventive health issues were discussed with patient, and age appropriate screening tests were ordered as noted in patient's After Visit Summary  Personalized health advice and appropriate referrals for health education or preventive services given if needed, as noted in patient's After Visit Summary  History of Present Illness:     Patient presents for a Medicare Wellness Visit    Patient is here for regular follow-up  No blood work prior to this visit  Otherwise no new complaints  Patient Care Team:  Servando Tillman MD as PCP - General  Mary Ellen Adler PA-C     Review of Systems:     Review of Systems   Constitutional: Negative for fatigue and fever  HENT: Negative for congestion, ear discharge, ear pain, postnasal drip, sinus pressure, sore throat, tinnitus and trouble swallowing  Eyes: Negative for discharge, itching and visual disturbance  Respiratory: Negative for cough and shortness of breath  Cardiovascular: Negative for chest pain and palpitations  Gastrointestinal: Negative for abdominal pain, diarrhea, nausea and vomiting  Endocrine: Negative for cold intolerance and polyuria  Genitourinary: Negative for difficulty urinating, dysuria and urgency  Musculoskeletal: Positive for arthralgias  Negative for neck pain  Skin: Negative for rash  Allergic/Immunologic: Negative for environmental allergies  Neurological: Negative for dizziness, weakness and headaches  Psychiatric/Behavioral: Negative for agitation, behavioral problems and confusion  The patient is not nervous/anxious           Problem List:     Patient Active Problem List   Diagnosis   • Benign essential hypertension   • Mixed hyperlipidemia   • Vitamin D deficiency   • 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   • Gastroesophageal reflux disease without esophagitis   • Age-related cataract of both eyes   • CKD (chronic kidney disease) stage 3, GFR 30-59 ml/min ([facility])   • Hyperglycemia   • Insomnia   • History of basal cell carcinoma   • Recurrent UTI   • Pain in both feet   • Age-related osteoporosis without current pathological fracture   • Closed fracture of bone of right foot   • Renal angiomyolipoma   • Prediabetes   • Chest pain due to gastrointestinal reflux disease   • Acute pain of right shoulder      Past Medical and Surgical History:     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 ([facility] [address] )     Last Assessed:3/18/2014     Past Surgical History:   Procedure Laterality Date   • APPENDECTOMY     • CATARACT EXTRACTION, BILATERAL     • HYSTERECTOMY     • INCISIONAL BREAST BIOPSY        Family History:     Family History   Problem Relation Age of Onset   • Diabetes Mother    • Varicose Veins Mother    • Diabetes Brother       Social History:     Social History     Socioeconomic History   • Marital status: /Civil Union     Spouse name: None   • Number of children: None   • Years of education: None   • Highest education level: None   Occupational History   • None   Tobacco Use   • Smoking status: Never   • Smokeless tobacco: Never   Vaping Use   • Vaping Use: Never used   Substance and Sexual Activity   • Alcohol use: Yes     Comment: socially   • Drug use: No   • Sexual activity: Not Currently   Other Topics Concern   • None   Social History Narrative   • None     Social Determinants of Health     Financial Resource Strain: Medium Risk (6/26/2023)    Overall Financial Resource Strain (CARDIA)    • Difficulty of Paying Living Expenses: Somewhat hard   Food Insecurity: Not on file   Transportation Needs: No Transportation Needs (6/26/2023)    PRAPARE - Transportation    • Lack of Transportation (Medical):  No    • Lack of Transportation (Non-Medical): No   Physical Activity: Not on file   Stress: Not on file   Social Connections: Not on file   Intimate Partner Violence: Not on file   Housing Stability: Not on file      Medications and Allergies:     Current Outpatient Medications   Medication Sig Dispense Refill   • acetaminophen (TYLENOL) 500 mg tablet Take 1 tablet (500 mg total) by mouth every 4 (four) hours as needed (knee and back pain) 90 tablet 1   • amLODIPine (NORVASC) 5 mg tablet Take 1 tablet (5 mg total) by mouth daily 90 tablet 1   • atorvastatin (LIPITOR) 20 mg tablet Take 1 tablet (20 mg total) by mouth daily 90 tablet 1   • calcium carbonate (OS-VIDA) 600 MG tablet Take 600 mg by mouth 2 (two) times a day with meals      • Cholecalciferol (VITAMIN D3 PO) Take 1 capsule by mouth daily 2000     • Cranberry-Vitamin C-Probiotic (AZO CRANBERRY PO) Take by mouth as needed     • Diclofenac Sodium (VOLTAREN) 1 % Apply 2 g topically 4 (four) times a day 1 Tube 2   • LORazepam (ATIVAN) 0 5 mg tablet Take 1 tablet (0 5 mg total) by mouth as needed (help sleep) 30 tablet 0   • Multiple Vitamins-Minerals (PRESERVISION AREDS 2) CAPS Take 1 capsule by mouth daily 90 capsule 1   • omeprazole (PriLOSEC) 40 MG capsule Take 1 capsule (40 mg total) by mouth daily 90 capsule 1   • methylPREDNISolone 4 MG tablet therapy pack Use as directed on package (Patient not taking: Reported on 4/25/2023) 21 each 0     No current facility-administered medications for this visit       Allergies   Allergen Reactions   • Daypro [Oxaprozin]      Unknown allergic reaction   • Minocycline Other (See Comments)     Loss of taste      Immunizations:     Immunization History   Administered Date(s) Administered   • COVID-19 PFIZER VACCINE 0 3 ML IM 02/17/2021, 03/10/2021, 11/08/2021   • INFLUENZA 10/30/2005, 12/12/2006, 10/05/2007, 10/23/2009, 10/01/2010, 10/03/2011, 10/10/2011, 10/01/2013, 10/02/2014, 09/18/2015, 10/06/2016, 10/08/2022   • Influenza Split High Dose Preservative Free IM 10/02/2014, 09/18/2015, 10/06/2016, 10/01/2019   • Influenza, high dose seasonal 0 7 mL 10/01/2018, 09/21/2020   • Influenza, seasonal, injectable 10/01/2013   • Pneumococcal Conjugate 13-Valent 04/29/2016   • Pneumococcal Polysaccharide PPV23 11/01/2004   • Td (adult), Unspecified 10/31/2005   • Zoster 11/01/2013   • Zoster Vaccine Recombinant 10/28/2020, 12/29/2020      Health Maintenance: There are no preventive care reminders to display for this patient  Topic Date Due   • COVID-19 Vaccine (4 - Pfizer series) 01/03/2022      Medicare Screening Tests and Risk Assessments: Jd Barreto is here for her Subsequent Wellness visit  Last Medicare Wellness visit information reviewed, patient interviewed and updates made to the record today  Health Risk Assessment:   Patient rates overall health as good  Patient feels that their physical health rating is slightly worse  Patient is satisfied with their life  Eyesight was rated as much worse  Hearing was rated as slightly worse  Patient feels that their emotional and mental health rating is slightly better  Patients states they are never, rarely angry  Patient states they are sometimes unusually tired/fatigued  Pain experienced in the last 7 days has been some  Patient's pain rating has been 4/10  Patient states that she has experienced no weight loss or gain in last 6 months  Depression Screening:   PHQ-2 Score: 0      Fall Risk Screening: In the past year, patient has experienced: no history of falling in past year      Urinary Incontinence Screening:   Patient has not leaked urine accidently in the last six months  Home Safety:  Patient does not have trouble with stairs inside or outside of their home  Patient has working smoke alarms and has no working carbon monoxide detector  Home safety hazards include: none  Nutrition:   Current diet is Regular       Medications:   Patient is currently taking over-the-counter supplements  OTC medications include: see medication list  Patient is able to manage medications  Activities of Daily Living (ADLs)/Instrumental Activities of Daily Living (IADLs):   Walk and transfer into and out of bed and chair?: Yes  Dress and groom yourself?: Yes    Bathe or shower yourself?: Yes    Feed yourself? Yes  Do your laundry/housekeeping?: Yes  Manage your money, pay your bills and track your expenses?: Yes  Make your own meals?: Yes    Do your own shopping?: Yes    Previous Hospitalizations:   Any hospitalizations or ED visits within the last 12 months?: No      Advance Care Planning:   Living will: Yes    Durable POA for healthcare: Yes    Advanced directive: Yes    Advanced directive counseling given: Yes      Cognitive Screening:   Provider or family/friend/caregiver concerned regarding cognition?: No    PREVENTIVE SCREENINGS      Cardiovascular Screening:    General: Screening Not Indicated and History Lipid Disorder      Diabetes Screening:     General: Screening Current      Colorectal Cancer Screening:     General: Screening Not Indicated      Breast Cancer Screening:     General: Risks and Benefits Discussed and Screening Current      Cervical Cancer Screening:    General: Screening Not Indicated      Osteoporosis Screening:    General: Screening Not Indicated and History Osteoporosis      Abdominal Aortic Aneurysm (AAA) Screening:        General: Screening Not Indicated      Lung Cancer Screening:     General: Screening Not Indicated      Hepatitis C Screening:    General: Screening Not Indicated    Screening, Brief Intervention, and Referral to Treatment (SBIRT)    Screening  Typical number of drinks in a day: 0  Typical number of drinks in a week: 1  Interpretation: Low risk drinking behavior      Single Item Drug Screening:  How often have you used an illegal drug (including marijuana) or a prescription medication for non-medical reasons in the past year? never    Single Item Drug "Screen Score: 0  Interpretation: Negative screen for possible drug use disorder    Brief Intervention  Alcohol & drug use screenings were reviewed  No concerns regarding substance use disorder identified  Other Counseling Topics:   Car/seat belt/driving safety, skin self-exam, sunscreen and calcium and vitamin D intake and regular weightbearing exercise  No results found  Physical Exam:     /62 (BP Location: Left arm, Patient Position: Sitting, Cuff Size: Standard)   Pulse 60   Temp 98 2 °F (36 8 °C) (Temporal)   Ht 5' 3\" (1 6 m)   Wt 58 1 kg (128 lb)   SpO2 98% Comment: room air  BMI 22 67 kg/m²     Physical Exam  Vitals and nursing note reviewed  Constitutional:       General: She is not in acute distress  Appearance: She is well-developed  HENT:      Head: Normocephalic and atraumatic  Right Ear: External ear normal       Left Ear: External ear normal       Nose: No rhinorrhea  Mouth/Throat:      Pharynx: No posterior oropharyngeal erythema  Eyes:      Conjunctiva/sclera: Conjunctivae normal    Cardiovascular:      Rate and Rhythm: Normal rate and regular rhythm  Heart sounds: No murmur heard  Pulmonary:      Effort: Pulmonary effort is normal  No respiratory distress  Breath sounds: Normal breath sounds  Abdominal:      Palpations: Abdomen is soft  Tenderness: There is no abdominal tenderness  Musculoskeletal:         General: Deformity present  No swelling  Cervical back: Neck supple  Right lower leg: No edema  Left lower leg: No edema  Comments: Mild prominence of right acromioclavicular junction noted  No tenderness   Skin:     General: Skin is warm and dry  Capillary Refill: Capillary refill takes less than 2 seconds  Neurological:      Mental Status: She is alert and oriented to person, place, and time     Psychiatric:         Mood and Affect: Mood normal          Behavior: Behavior normal           Hampton Cushing, " MD

## 2023-06-27 ENCOUNTER — TELEPHONE (OUTPATIENT)
Dept: INTERNAL MEDICINE CLINIC | Facility: OTHER | Age: 88
End: 2023-06-27

## 2023-06-27 NOTE — TELEPHONE ENCOUNTER
Per Dr Hedy Calderon to please see if patient can get prolia injections  If approved please schedule with Dr Hedy Calderon

## 2023-07-05 ENCOUNTER — TELEPHONE (OUTPATIENT)
Dept: INTERNAL MEDICINE CLINIC | Facility: OTHER | Age: 88
End: 2023-07-05

## 2023-07-07 NOTE — TELEPHONE ENCOUNTER
Sending to [address] [place] [address] for the verification of benefits for the Prolia

## 2023-07-11 DIAGNOSIS — E78.2 MIXED HYPERLIPIDEMIA: ICD-10-CM

## 2023-07-11 DIAGNOSIS — I10 ESSENTIAL HYPERTENSION: ICD-10-CM

## 2023-07-11 DIAGNOSIS — G47.00 INSOMNIA, UNSPECIFIED TYPE: ICD-10-CM

## 2023-07-11 RX ORDER — AMLODIPINE BESYLATE 5 MG/1
5 TABLET ORAL DAILY
Qty: 90 TABLET | Refills: 1 | Status: SHIPPED | OUTPATIENT
Start: 2023-07-11

## 2023-07-11 RX ORDER — ATORVASTATIN CALCIUM 20 MG/1
20 TABLET, FILM COATED ORAL DAILY
Qty: 90 TABLET | Refills: 1 | Status: SHIPPED | OUTPATIENT
Start: 2023-07-11

## 2023-07-11 RX ORDER — LORAZEPAM 0.5 MG/1
0.5 TABLET ORAL AS NEEDED
Qty: 30 TABLET | Refills: 0 | Status: SHIPPED | OUTPATIENT
Start: 2023-07-11

## 2023-08-17 ENCOUNTER — OFFICE VISIT (OUTPATIENT)
Dept: INTERNAL MEDICINE CLINIC | Facility: OTHER | Age: 88
End: 2023-08-17
Payer: COMMERCIAL

## 2023-08-17 VITALS
HEIGHT: 63 IN | WEIGHT: 127.8 LBS | HEART RATE: 63 BPM | OXYGEN SATURATION: 98 % | BODY MASS INDEX: 22.64 KG/M2 | RESPIRATION RATE: 18 BRPM | TEMPERATURE: 97.8 F | SYSTOLIC BLOOD PRESSURE: 138 MMHG | DIASTOLIC BLOOD PRESSURE: 74 MMHG

## 2023-08-17 DIAGNOSIS — I10 BENIGN ESSENTIAL HYPERTENSION: ICD-10-CM

## 2023-08-17 DIAGNOSIS — K21.9 GASTROESOPHAGEAL REFLUX DISEASE WITHOUT ESOPHAGITIS: ICD-10-CM

## 2023-08-17 DIAGNOSIS — M54.2 NECK PAIN: Primary | ICD-10-CM

## 2023-08-17 DIAGNOSIS — N18.31 STAGE 3A CHRONIC KIDNEY DISEASE (HCC): ICD-10-CM

## 2023-08-17 PROCEDURE — 99213 OFFICE O/P EST LOW 20 MIN: CPT | Performed by: INTERNAL MEDICINE

## 2023-08-17 RX ORDER — CYCLOBENZAPRINE HCL 5 MG
5 TABLET ORAL 2 TIMES DAILY PRN
Qty: 20 TABLET | Refills: 0 | Status: SHIPPED | OUTPATIENT
Start: 2023-08-17 | End: 2023-08-19

## 2023-08-17 NOTE — PROGRESS NOTES
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 We will use Flexeril 5 mg p.o. twice daily for 4 to 5 days. Also advised to use Voltaren gel topically 3 times a day and heating pad. If no improvement in the next few days also referral for physical therapy given. Take Tylenol 500 mg 2 tablet 3 times daily as needed    Gastroesophageal reflux disease without esophagitis  Continue with omeprazole 40 mg daily. Benign essential hypertension  Blood pressure stable on present regimen. Diagnoses and all orders for this visit:    Neck pain  -     cyclobenzaprine (FLEXERIL) 5 mg tablet; Take 1 tablet (5 mg total) by mouth 2 (two) times a day as needed for muscle spasms for up to 10 days  -     Ambulatory Referral to Physical Therapy; Future    Gastroesophageal reflux disease without esophagitis    Benign essential hypertension    Stage 3a chronic kidney disease (HCC)               Subjective:          Patient ID: Frankey Radish is a 81 y.o. female. Patient came to office with complaint of difficulty in moving neck on either direction. Yesterday she woke up with stiff neck. Denied any trauma. No fall. No tingling numbness of either extremity    Neck Pain   Pertinent negatives include no chest pain, fever, headaches, trouble swallowing or weakness. The following portions of the patient's history were reviewed and updated as appropriate: allergies, current medications, past family history, past medical history, past social history, past surgical history and problem list.    Review of Systems   Constitutional: Negative for fatigue and fever. HENT: Negative for congestion, ear discharge, ear pain, postnasal drip, sinus pressure, sore throat, tinnitus and trouble swallowing. Eyes: Negative for discharge, itching and visual disturbance. Respiratory: Negative for cough and shortness of breath. Cardiovascular: Negative for chest pain and palpitations.    Gastrointestinal: Negative for abdominal pain, diarrhea, nausea and vomiting. Endocrine: Negative for cold intolerance and polyuria. Genitourinary: Negative for difficulty urinating, dysuria and urgency. Musculoskeletal: Positive for neck pain. Negative for arthralgias. Skin: Negative for rash. Allergic/Immunologic: Negative for environmental allergies. Neurological: Negative for dizziness, weakness and headaches. Psychiatric/Behavioral: Negative for agitation and behavioral problems. The patient is not nervous/anxious.           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)     Last Assessed:3/18/2014         Current Outpatient Medications:   •  acetaminophen (TYLENOL) 500 mg tablet, Take 1 tablet (500 mg total) by mouth every 4 (four) hours as needed (knee and back pain), Disp: 90 tablet, Rfl: 1  •  amLODIPine (NORVASC) 5 mg tablet, Take 1 tablet (5 mg total) by mouth daily, Disp: 90 tablet, Rfl: 1  •  atorvastatin (LIPITOR) 20 mg tablet, Take 1 tablet (20 mg total) by mouth daily, Disp: 90 tablet, Rfl: 1  •  calcium carbonate (OS-VIDA) 600 MG tablet, Take 600 mg by mouth 2 (two) times a day with meals , Disp: , Rfl:   •  Cholecalciferol (VITAMIN D3 PO), Take 1 capsule by mouth daily 2000, Disp: , Rfl:   •  Cranberry-Vitamin C-Probiotic (AZO CRANBERRY PO), Take by mouth as needed, Disp: , Rfl:   •  cyclobenzaprine (FLEXERIL) 5 mg tablet, Take 1 tablet (5 mg total) by mouth 2 (two) times a day as needed for muscle spasms for up to 10 days, Disp: 20 tablet, Rfl: 0  •  Diclofenac Sodium (VOLTAREN) 1 %, Apply 2 g topically 4 (four) times a day, Disp: 1 Tube, Rfl: 2  •  LORazepam (ATIVAN) 0.5 mg tablet, Take 1 tablet (0.5 mg total) by mouth as needed (help sleep), Disp: 30 tablet, Rfl: 0  •  Multiple Vitamins-Minerals (PRESERVISION AREDS 2) CAPS, Take 1 capsule by mouth daily, Disp: 90 capsule, Rfl: 1  •  omeprazole (PriLOSEC) 40 MG capsule, Take 1 capsule (40 mg total) by mouth daily, Disp: 90 capsule, Rfl: 1  •  methylPREDNISolone 4 MG tablet therapy pack, Use as directed on package (Patient not taking: Reported on 4/25/2023), Disp: 21 each, Rfl: 0    Allergies   Allergen Reactions   • Daypro [Oxaprozin]      Unknown allergic reaction   • Minocycline Other (See Comments)     Loss of taste       Social History   Past Surgical History:   Procedure Laterality Date   • APPENDECTOMY     • CATARACT EXTRACTION, BILATERAL     • HYSTERECTOMY     • INCISIONAL BREAST BIOPSY       Family History   Problem Relation Age of Onset   • Diabetes Mother    • Varicose Veins Mother    • Diabetes Brother        Objective:  /74 (BP Location: Left arm, Patient Position: Sitting, Cuff Size: Standard)   Pulse 63   Temp 97.8 °F (36.6 °C) (Temporal)   Resp 18   Ht 5' 3" (1.6 m)   Wt 58 kg (127 lb 12.8 oz)   SpO2 98%   BMI 22.64 kg/m²   Body mass index is 22.64 kg/m². Physical Exam  Constitutional:       Appearance: She is well-developed. She is not ill-appearing or diaphoretic. HENT:      Head: Normocephalic. Right Ear: External ear normal.      Left Ear: External ear normal.      Nose: No rhinorrhea. Mouth/Throat:      Pharynx: No posterior oropharyngeal erythema. Eyes:      General: No scleral icterus. Pupils: Pupils are equal, round, and reactive to light. Neck:      Thyroid: No thyromegaly. Trachea: No tracheal deviation. Cardiovascular:      Rate and Rhythm: Normal rate and regular rhythm. Heart sounds: Normal heart sounds. No murmur heard.   Pulmonary:      Effort: Pulmonary effort is normal. No respiratory distress. Breath sounds: Normal breath sounds. Chest:      Chest wall: No tenderness. Abdominal:      General: Bowel sounds are normal.      Palpations: Abdomen is soft. There is no mass. Tenderness: There is no abdominal tenderness. Musculoskeletal:         General: Normal range of motion. Cervical back: Normal range of motion and neck supple. Right lower leg: No edema. Left lower leg: No edema. Lymphadenopathy:      Cervical: No cervical adenopathy. Skin:     General: Skin is warm. Neurological:      Mental Status: She is alert and oriented to person, place, and time. Cranial Nerves: No cranial nerve deficit.    Psychiatric:         Mood and Affect: Mood normal.         Behavior: Behavior normal.

## 2023-08-17 NOTE — ASSESSMENT & PLAN NOTE
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 We will use Flexeril 5 mg p.o. twice daily for 4 to 5 days. Also advised to use Voltaren gel topically 3 times a day and heating pad. If no improvement in the next few days also referral for physical therapy given.   Take Tylenol 500 mg 2 tablet 3 times daily as needed

## 2023-08-18 ENCOUNTER — HOSPITAL ENCOUNTER (OUTPATIENT)
Facility: HOSPITAL | Age: 88
Setting detail: OBSERVATION
Discharge: HOME | End: 2023-08-19
Attending: EMERGENCY MEDICINE | Admitting: INTERNAL MEDICINE
Payer: COMMERCIAL

## 2023-08-18 ENCOUNTER — APPOINTMENT (EMERGENCY)
Dept: RADIOLOGY | Facility: HOSPITAL | Age: 88
End: 2023-08-18
Payer: COMMERCIAL

## 2023-08-18 DIAGNOSIS — R55 SYNCOPE: Primary | ICD-10-CM

## 2023-08-18 LAB
2HR DELTA HS TROPONIN: 0 NG/L
4HR DELTA HS TROPONIN: -2 NG/L
ALBUMIN SERPL BCP-MCNC: 3.4 G/DL (ref 3.5–5)
ALP SERPL-CCNC: 65 U/L (ref 46–116)
ALT SERPL W P-5'-P-CCNC: 12 U/L (ref 12–78)
ANION GAP SERPL CALCULATED.3IONS-SCNC: 4 MMOL/L
AST SERPL W P-5'-P-CCNC: 11 U/L (ref 5–45)
BASOPHILS # BLD AUTO: 0.03 THOUSANDS/ÂΜL (ref 0–0.1)
BASOPHILS NFR BLD AUTO: 0 % (ref 0–1)
BILIRUB SERPL-MCNC: 0.58 MG/DL (ref 0.2–1)
BUN SERPL-MCNC: 12 MG/DL (ref 5–25)
CALCIUM ALBUM COR SERPL-MCNC: 10.1 MG/DL (ref 8.3–10.1)
CALCIUM SERPL-MCNC: 9.6 MG/DL (ref 8.3–10.1)
CARDIAC TROPONIN I PNL SERPL HS: 5 NG/L
CARDIAC TROPONIN I PNL SERPL HS: 7 NG/L
CARDIAC TROPONIN I PNL SERPL HS: 7 NG/L
CHLORIDE SERPL-SCNC: 107 MMOL/L (ref 96–108)
CO2 SERPL-SCNC: 25 MMOL/L (ref 21–32)
CREAT SERPL-MCNC: 0.73 MG/DL (ref 0.6–1.3)
EOSINOPHIL # BLD AUTO: 0 THOUSAND/ÂΜL (ref 0–0.61)
EOSINOPHIL NFR BLD AUTO: 0 % (ref 0–6)
ERYTHROCYTE [DISTWIDTH] IN BLOOD BY AUTOMATED COUNT: 12.6 % (ref 11.6–15.1)
GFR SERPL CREATININE-BSD FRML MDRD: 74 ML/MIN/1.73SQ M
GLUCOSE SERPL-MCNC: 135 MG/DL (ref 65–140)
HCT VFR BLD AUTO: 40.3 % (ref 34.8–46.1)
HGB BLD-MCNC: 13 G/DL (ref 11.5–15.4)
IMM GRANULOCYTES # BLD AUTO: 0.07 THOUSAND/UL (ref 0–0.2)
IMM GRANULOCYTES NFR BLD AUTO: 1 % (ref 0–2)
LYMPHOCYTES # BLD AUTO: 2.38 THOUSANDS/ÂΜL (ref 0.6–4.47)
LYMPHOCYTES NFR BLD AUTO: 19 % (ref 14–44)
MCH RBC QN AUTO: 28.3 PG (ref 26.8–34.3)
MCHC RBC AUTO-ENTMCNC: 32.3 G/DL (ref 31.4–37.4)
MCV RBC AUTO: 88 FL (ref 82–98)
MONOCYTES # BLD AUTO: 0.98 THOUSAND/ÂΜL (ref 0.17–1.22)
MONOCYTES NFR BLD AUTO: 8 % (ref 4–12)
NEUTROPHILS # BLD AUTO: 9.39 THOUSANDS/ÂΜL (ref 1.85–7.62)
NEUTS SEG NFR BLD AUTO: 72 % (ref 43–75)
NRBC BLD AUTO-RTO: 0 /100 WBCS
PLATELET # BLD AUTO: 218 THOUSANDS/UL (ref 149–390)
PMV BLD AUTO: 10.3 FL (ref 8.9–12.7)
POTASSIUM SERPL-SCNC: 3.9 MMOL/L (ref 3.5–5.3)
PROT SERPL-MCNC: 7.3 G/DL (ref 6.4–8.4)
RBC # BLD AUTO: 4.6 MILLION/UL (ref 3.81–5.12)
SODIUM SERPL-SCNC: 136 MMOL/L (ref 135–147)
WBC # BLD AUTO: 12.85 THOUSAND/UL (ref 4.31–10.16)

## 2023-08-18 PROCEDURE — 80053 COMPREHEN METABOLIC PANEL: CPT

## 2023-08-18 PROCEDURE — 99223 1ST HOSP IP/OBS HIGH 75: CPT | Performed by: INTERNAL MEDICINE

## 2023-08-18 PROCEDURE — 99285 EMERGENCY DEPT VISIT HI MDM: CPT | Performed by: EMERGENCY MEDICINE

## 2023-08-18 PROCEDURE — 99284 EMERGENCY DEPT VISIT MOD MDM: CPT

## 2023-08-18 PROCEDURE — 93005 ELECTROCARDIOGRAM TRACING: CPT

## 2023-08-18 PROCEDURE — 70496 CT ANGIOGRAPHY HEAD: CPT

## 2023-08-18 PROCEDURE — G1004 CDSM NDSC: HCPCS

## 2023-08-18 PROCEDURE — 70498 CT ANGIOGRAPHY NECK: CPT

## 2023-08-18 PROCEDURE — 84484 ASSAY OF TROPONIN QUANT: CPT

## 2023-08-18 PROCEDURE — 36415 COLL VENOUS BLD VENIPUNCTURE: CPT

## 2023-08-18 PROCEDURE — 85025 COMPLETE CBC W/AUTO DIFF WBC: CPT

## 2023-08-18 RX ORDER — LIDOCAINE 50 MG/G
1 PATCH TOPICAL DAILY
Status: DISCONTINUED | OUTPATIENT
Start: 2023-08-18 | End: 2023-08-19 | Stop reason: HOSPADM

## 2023-08-18 RX ORDER — ACETAMINOPHEN 325 MG/1
650 TABLET ORAL EVERY 6 HOURS PRN
Status: DISCONTINUED | OUTPATIENT
Start: 2023-08-18 | End: 2023-08-19 | Stop reason: HOSPADM

## 2023-08-18 RX ORDER — AMLODIPINE BESYLATE 5 MG/1
5 TABLET ORAL DAILY
Status: DISCONTINUED | OUTPATIENT
Start: 2023-08-18 | End: 2023-08-19 | Stop reason: HOSPADM

## 2023-08-18 RX ORDER — ATORVASTATIN CALCIUM 20 MG/1
20 TABLET, FILM COATED ORAL DAILY
Status: DISCONTINUED | OUTPATIENT
Start: 2023-08-18 | End: 2023-08-19 | Stop reason: HOSPADM

## 2023-08-18 RX ORDER — PANTOPRAZOLE SODIUM 40 MG/1
40 TABLET, DELAYED RELEASE ORAL
Status: DISCONTINUED | OUTPATIENT
Start: 2023-08-19 | End: 2023-08-19 | Stop reason: HOSPADM

## 2023-08-18 RX ORDER — ENOXAPARIN SODIUM 100 MG/ML
40 INJECTION SUBCUTANEOUS DAILY
Status: DISCONTINUED | OUTPATIENT
Start: 2023-08-18 | End: 2023-08-19 | Stop reason: HOSPADM

## 2023-08-18 RX ORDER — ACETAMINOPHEN 325 MG/1
975 TABLET ORAL ONCE
Status: COMPLETED | OUTPATIENT
Start: 2023-08-18 | End: 2023-08-18

## 2023-08-18 RX ADMIN — ATORVASTATIN CALCIUM 20 MG: 20 TABLET, FILM COATED ORAL at 15:37

## 2023-08-18 RX ADMIN — LIDOCAINE 5% 1 PATCH: 700 PATCH TOPICAL at 15:38

## 2023-08-18 RX ADMIN — AMLODIPINE BESYLATE 5 MG: 5 TABLET ORAL at 15:37

## 2023-08-18 RX ADMIN — IOHEXOL 85 ML: 350 INJECTION, SOLUTION INTRAVENOUS at 11:34

## 2023-08-18 RX ADMIN — ACETAMINOPHEN 650 MG: 325 TABLET, FILM COATED ORAL at 20:43

## 2023-08-18 RX ADMIN — ACETAMINOPHEN 975 MG: 325 TABLET, FILM COATED ORAL at 12:11

## 2023-08-18 NOTE — ASSESSMENT & PLAN NOTE
Goal Outcome Evaluation:  Plan of Care Reviewed With: spouse  Progress: declining  Outcome Summary: Pt is labored with breathing and using abd muscles despite 02 sats in mid 90's. Pt had a dose of morphine with minimal effects.  Rn to repeat morphine, ativan and robinul. wife at the bedside. reviewed plan of care with her.   History of mixed hyperlipidemia last lipid panel checked in 2021 well maintained on atorvastatin 20 mg daily    Plan  Will continue home statin

## 2023-08-18 NOTE — ED ATTENDING ATTESTATION
8/18/2023  Isiah Casillas DO, saw and evaluated the patient. I have discussed the patient with the resident/non-physician practitioner and agree with the resident's/non-physician practitioner's findings, Plan of Care, and MDM as documented in the resident's/non-physician practitioner's note, except where noted. All available labs and Radiology studies were reviewed. I was present for key portions of any procedure(s) performed by the resident/non-physician practitioner and I was immediately available to provide assistance. At this point I agree with the current assessment done in the Emergency Department. I have conducted an independent evaluation of this patient a history and physical is as follows:    81-year-old female presents via EMS after 2 episodes of syncope this morning. Patient states that she has been feeling generally weak and has been unable to independently get out of bed since having a skin biopsy on her chest a couple of days ago. She states when she awoke the following day she had pain and stiffness in her neck, limiting her range of motion. She saw her PCP and was clinically diagnosed with torticollis and given Flexeril and Voltaren gel. She did not use these medications today. This morning, she attempted to get out of bed and was ambulating to the bathroom with the assistance of her  when she felt weak, dizzy and passed out. She states that her  assisted her to the ground, preventing any head trauma. An unknown amount of time later, she had another syncopal episode while returning to bed. Preceding the second episode, she states she had no prodromal symptoms and passed out suddenly. She reports having excruciating right-sided mid neck pain but also radiation into her trapezius and neck strap muscles. However, she has no pain upon palpation, only with movement of her neck.   She is somewhat of a poor historian and just repeats that she has not felt right since the biopsy. No recent travel or sick contacts. ROS: Denies f/c, HA, CP, SOB, abdominal pain, n/v/d. 12 system ROS o/w negative. PE: NAD, though appears uncomfortable, alert; PERRL, EOMI; MMM, no posterior oropharyngeal exudate, edema or erythema; no carotid bruits bilaterally, no point TTP over the neck or C-spine; HRR, no murmur, monitor shows sinus rhythm at 66 bpm; lungs CTA w/o w/r/r, POx 99% on RA (nl); abdomen s/nt/nd, nl BS in all 4 quadrant; (-) LE edema or calf TTP, FROM extremities x4; skin p/w/d; CNs GI/NF, oriented. MDM/DDx: Syncope x2 with right lateral neck pain - carotid dissection, orthostasis, autonomic dysfunction, dysrhythmia, dehydration, vertebrobasilar insufficiency, at risk for ACS/MI, stroke. I independently reviewed and interpreted ordered labs and EKG from this encounter. A/P: Will check cardiac w/u, CTA head and neck, reevaluate for further work up and disposition (probable admission).     ED Course         Critical Care Time  Procedures

## 2023-08-18 NOTE — ASSESSMENT & PLAN NOTE
Lab Results   Component Value Date    EGFR 74 08/18/2023    EGFR 67 08/02/2021    EGFR 56 12/03/2020    CREATININE 0.73 08/18/2023    CREATININE 0.81 08/02/2021    CREATININE 0.94 12/03/2020     History of CKD with no acute kidney injury    Plan:    We will continue monitoring kidney function and avoid nephrotoxic agents

## 2023-08-18 NOTE — ASSESSMENT & PLAN NOTE
History of hypertension maintained on amlodipine 5 mg daily  Presenting to the ED not having taken her home blood pressure medications with elevated blood pressures with systolics in the 615J; does not appear to be causing the patient's syncope as she does not endorse orthostatic symptoms and is a chronic medication    Plan:   We will continue home amlodipine 5 mg daily on discharge

## 2023-08-18 NOTE — ASSESSMENT & PLAN NOTE
Patient reports waking up one  morning with a stiff neck after a skin biopsy and being prescribed Flexeril which did not improve her pain however made her feel weak. 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

## 2023-08-18 NOTE — ASSESSMENT & PLAN NOTE
Patient is presenting with 2 episodes of syncope this morning while walking after. Recent prescription for Flexeril for neck pain  Patient has history of vasovagal symptoms and passing out concerns stimuli that caused her to be uneasy last occurring about 3 years ago. Patient denies any palpitations, lightheadedness with change in position, decreased p.o. intake, symptoms of anxiety, exhaustion, seizure-like activities, history of migraine or headaches, focal weakness  Symptoms likely secondary to newly prescribed Flexeril versus vasovagal  Last echocardiogram in 2020 showed EF 60% with no significant valvular abnormalities  CTA of the head negative for any acute processes  Orthostatics negative, no events on telemetry, continues to report being asymptomatic at this time    Plan:   We will avoid muscle relaxants  Recheck echocardiogram outpt

## 2023-08-19 VITALS
HEIGHT: 63 IN | DIASTOLIC BLOOD PRESSURE: 70 MMHG | RESPIRATION RATE: 20 BRPM | WEIGHT: 127 LBS | BODY MASS INDEX: 22.5 KG/M2 | HEART RATE: 73 BPM | TEMPERATURE: 97.8 F | SYSTOLIC BLOOD PRESSURE: 155 MMHG | OXYGEN SATURATION: 95 %

## 2023-08-19 PROBLEM — M54.2 NECK PAIN: Status: RESOLVED | Noted: 2023-08-17 | Resolved: 2023-08-19

## 2023-08-19 LAB
ANION GAP SERPL CALCULATED.3IONS-SCNC: 5 MMOL/L
ATRIAL RATE: 67 BPM
BASOPHILS # BLD AUTO: 0.04 THOUSANDS/ÂΜL (ref 0–0.1)
BASOPHILS NFR BLD AUTO: 0 % (ref 0–1)
BUN SERPL-MCNC: 14 MG/DL (ref 5–25)
CALCIUM SERPL-MCNC: 9.7 MG/DL (ref 8.3–10.1)
CHLORIDE SERPL-SCNC: 106 MMOL/L (ref 96–108)
CO2 SERPL-SCNC: 25 MMOL/L (ref 21–32)
CREAT SERPL-MCNC: 0.69 MG/DL (ref 0.6–1.3)
EOSINOPHIL # BLD AUTO: 0.07 THOUSAND/ÂΜL (ref 0–0.61)
EOSINOPHIL NFR BLD AUTO: 1 % (ref 0–6)
ERYTHROCYTE [DISTWIDTH] IN BLOOD BY AUTOMATED COUNT: 12.6 % (ref 11.6–15.1)
GFR SERPL CREATININE-BSD FRML MDRD: 78 ML/MIN/1.73SQ M
GLUCOSE SERPL-MCNC: 107 MG/DL (ref 65–140)
HCT VFR BLD AUTO: 40.1 % (ref 34.8–46.1)
HGB BLD-MCNC: 12.9 G/DL (ref 11.5–15.4)
IMM GRANULOCYTES # BLD AUTO: 0.04 THOUSAND/UL (ref 0–0.2)
IMM GRANULOCYTES NFR BLD AUTO: 0 % (ref 0–2)
LYMPHOCYTES # BLD AUTO: 2.49 THOUSANDS/ÂΜL (ref 0.6–4.47)
LYMPHOCYTES NFR BLD AUTO: 24 % (ref 14–44)
MCH RBC QN AUTO: 28.2 PG (ref 26.8–34.3)
MCHC RBC AUTO-ENTMCNC: 32.2 G/DL (ref 31.4–37.4)
MCV RBC AUTO: 88 FL (ref 82–98)
MONOCYTES # BLD AUTO: 0.98 THOUSAND/ÂΜL (ref 0.17–1.22)
MONOCYTES NFR BLD AUTO: 10 % (ref 4–12)
NEUTROPHILS # BLD AUTO: 6.72 THOUSANDS/ÂΜL (ref 1.85–7.62)
NEUTS SEG NFR BLD AUTO: 65 % (ref 43–75)
NRBC BLD AUTO-RTO: 0 /100 WBCS
P AXIS: 43 DEGREES
PLATELET # BLD AUTO: 212 THOUSANDS/UL (ref 149–390)
PMV BLD AUTO: 10.2 FL (ref 8.9–12.7)
POTASSIUM SERPL-SCNC: 3.9 MMOL/L (ref 3.5–5.3)
PR INTERVAL: 148 MS
QRS AXIS: 29 DEGREES
QRSD INTERVAL: 96 MS
QT INTERVAL: 390 MS
QTC INTERVAL: 412 MS
RBC # BLD AUTO: 4.57 MILLION/UL (ref 3.81–5.12)
SODIUM SERPL-SCNC: 136 MMOL/L (ref 135–147)
T WAVE AXIS: 68 DEGREES
VENTRICULAR RATE: 67 BPM
WBC # BLD AUTO: 10.34 THOUSAND/UL (ref 4.31–10.16)

## 2023-08-19 PROCEDURE — 85025 COMPLETE CBC W/AUTO DIFF WBC: CPT

## 2023-08-19 PROCEDURE — 99239 HOSP IP/OBS DSCHRG MGMT >30: CPT | Performed by: INTERNAL MEDICINE

## 2023-08-19 PROCEDURE — 80048 BASIC METABOLIC PNL TOTAL CA: CPT

## 2023-08-19 PROCEDURE — 93010 ELECTROCARDIOGRAM REPORT: CPT | Performed by: INTERNAL MEDICINE

## 2023-08-19 PROCEDURE — 97166 OT EVAL MOD COMPLEX 45 MIN: CPT

## 2023-08-19 PROCEDURE — 97162 PT EVAL MOD COMPLEX 30 MIN: CPT

## 2023-08-19 RX ADMIN — ACETAMINOPHEN 650 MG: 325 TABLET, FILM COATED ORAL at 06:10

## 2023-08-19 RX ADMIN — PANTOPRAZOLE SODIUM 40 MG: 40 TABLET, DELAYED RELEASE ORAL at 06:08

## 2023-08-19 RX ADMIN — AMLODIPINE BESYLATE 5 MG: 5 TABLET ORAL at 09:21

## 2023-08-19 RX ADMIN — ATORVASTATIN CALCIUM 20 MG: 20 TABLET, FILM COATED ORAL at 09:21

## 2023-08-19 RX ADMIN — LIDOCAINE 5% 1 PATCH: 700 PATCH TOPICAL at 09:21

## 2023-08-19 NOTE — OCCUPATIONAL THERAPY NOTE
Occupational Therapy Evaluation     Patient Name: Rom Acevedo  UIAEI'G Date: 8/19/2023  Problem List  Principal Problem:    Syncope  Active Problems:    Benign essential hypertension    Mixed hyperlipidemia    Gastroesophageal reflux disease without esophagitis    CKD (chronic kidney disease) stage 3, GFR 30-59 ml/min ([facility])    Past Medical History  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 ([address])     Last Assessed:3/18/2014     Past Surgical History  Past Surgical History:   Procedure Laterality Date    APPENDECTOMY      CATARACT EXTRACTION, BILATERAL      HYSTERECTOMY      INCISIONAL BREAST BIOPSY           08/19/23 0805   OT Last Visit   OT Visit Date 08/19/23   Note Type   Note type Evaluation   Pain Assessment   Pain Assessment Tool 0-10   Pain Score 1   Pain Location/Orientation Location: Neck   Restrictions/Precautions   Weight Bearing Precautions Per Order No   Home Living   Type of Home House   Home Layout Two level; Able to live on main level with bedroom/bathroom   Home Equipment Walker;Cane   Prior Function   Level of [place] Independent with ADLs; Independent with functional mobility; Needs assistance with IADLS   Lives With Spouse   Receives Help From [facility] in the last 6 months 0   Lifestyle   Autonomy I adls and mobility - i iadls - shares homemaking with family   Reciprocal Relationships supportive family - reports spouse is in good health and able to assist - other family local as well   Service to Others retired   Intrinsic Gratification active pta - enjoys traveling   Subjective   Subjective "I only used the cane when we went on a cruise because you get to board faster"   ADL   Eating Assistance 7  [address] 5  Supervision/Setup    [address] 5  Supervision/Setup   [address] 5  Supervision/Setup   [address] 5  [address]  5  Supervision/Setup   Bed Mobility   Additional Comments seated on EOB on approach   Transfers   Sit to [address] to Sit 5  Supervision   Functional Mobility   Functional Mobility 5  Supervision   Balance   Static Sitting Good   Dynamic Sitting Fair +   Static Standing Fair   Dynamic Standing [address] -   Activity Tolerance   Activity Tolerance Patient tolerated treatment well   Medical Staff Made Aware [facility] Ho   RUE Assessment   RUE Assessment WFL   LUE Assessment   LUE Assessment WFL   Cognition   Overall Cognitive Status WFL   Assessment   Limitation Decreased endurance;Decreased self-care trans;Decreased high-level ADLs   Prognosis Good   Assessment Pt is a 81 y.o. female who was admitted to [facility] on 8/18/2023 with Syncope -pt reports neck pain/stiffiness s/p bx on chest - was prescribed muscle relaxant and reports feeling weak and "not right" ever since bx - became lightheaded and feels she passed out while walking - spouse lowered pt to floor . Patient  At baseline pt was completing adls and mobility independently w/o ad - I iadls - shares homemaking with spouse. Pt lives with spouse in 2 story home with no LENNY - has FFSU. Currently pt requires sba for overall ADLS and sba for functional mobility/transfers. Pt currently presents with impairments in the following categories -difficulty performing IADLS  and environment activity tolerance, endurance and standing balance/tolerance.  These impairments, as well as pt's fatigue, risk for falls and home environment  limit pt's ability to safely engage in all baseline areas of occupation, includingfunctional mobility/transfers, community mobility, laundry , house maintenance, meal prep, cleaning, social participation  and leisure activities however pt has supportive family who are able to provide assist prn -  From OT standpoint, recommend home with family support upon D/C. No immediate acute OT needs indicated at this time- d/c from caseload   Goals   Patient Goals go home   Plan   OT Frequency Eval only   Recommendation   OT Discharge Recommendation No rehabilitation needs   AM-PAC Daily Activity Inpatient   Lower Body Dressing 4   Bathing 4   Toileting 4   Upper Body Dressing 4   Grooming 4   Eating 4   Daily Activity Raw Score 24   Daily Activity Standardized Score (Calc for Raw Score >=11) 57.54   AM-PAC Applied Cognition Inpatient   Following a Speech/Presentation 4   Understanding Ordinary Conversation 4   Taking Medications 4   Remembering Where Things Are Placed or Put Away 4   Remembering List of 4-5 Errands 3   Taking Care of Complicated Tasks 3   Applied Cognition Raw Score 22   Applied Cognition Standardized Score 47.83   End of Consult   Education Provided Yes   Patient Position at End of Consult All needs within reach; Seated edge of bed   Nurse Communication Nurse aware of consult         The patient's raw score on the AM-PAC Daily Activity Inpatient Short Form is 24. A raw score of greater than or equal to 19 suggests the patient may benefit from discharge to home. Please refer to the recommendation of the Occupational Therapist for safe discharge planning.       [facility]

## 2023-08-19 NOTE — DISCHARGE INSTR - AVS FIRST PAGE
Please make an appoint with Dr. Roxane Sood within 1 week   Contact your PCP or come back to the ED if you have had another episode of syncope or experience palpitations    Maintain adequate hydration    Please stop taking the Flexeril

## 2023-08-19 NOTE — PHYSICAL THERAPY NOTE
PHYSICAL THERAPY EVALUATION  NAME:  Anneliese Nicole  DATE: 08/19/23    AGE:   81 y.o. 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 ([address])     Last Assessed:3/18/2014       Past Surgical History:   Procedure Laterality Date    APPENDECTOMY      CATARACT EXTRACTION, BILATERAL      HYSTERECTOMY      INCISIONAL BREAST BIOPSY         Length Of Stay: 0    PHYSICAL THERAPY EVALUATION:        08/19/23 6156   Note Type   Note type Evaluation   Pain Assessment   Pain Assessment Tool 0-10   Pain Score No Pain   Restrictions/Precautions   Weight Bearing Precautions Per Order No   Home Living   Type of [address] [facility] [facility] [address]  Two level; Able to live on main level with bedroom/bathroom;Stairs to enter with rails  (0 LENNY)   Home Equipment Walker;Cane   Additional Comments Pt reports living with supportive spouse who is able to assist if needed   Prior Function   Level of [place] Independent with functional mobility   Lives With Spouse   Receives Help From [facility] in the last 6 months 0   Comments Pt denies the use of an AD for ambulation PTA   General   Family/Caregiver Present No   Cognition   Overall Cognitive Status WFL   Arousal/Participation Alert   Orientation Level Oriented X4   Memory Within functional limits   Following Commands Follows all commands and directions without difficulty   RUE Assessment   RUE Assessment WFL   LUE Assessment   LUE Assessment WFL   RLE Assessment   RLE Assessment WFL   LLE Assessment   LLE Assessment WFL   Bed Mobility   Sit to Supine 5 Supervision   Transfers   Sit to Stand 5  Supervision   Stand to Sit 5  Supervision   Ambulation/Elevation   Gait pattern WNL   Gait Assistance 5  Supervision   Assistive Device None   Distance 65ft x 2   Stair Management Assistance 5  Supervision   Stair Management Technique One rail R   Number of Stairs 3   Balance   Static Sitting Fair +   Static Standing Fair   Ambulatory Fair -   Activity Tolerance   Activity Tolerance Patient tolerated treatment well   Medical Staff Made Aware Samantha Galan OT   Nurse Made Aware Pt appropriate to be seen and mobilize per nsg   Assessment   Prognosis Good   Problem List Decreased mobility   Assessment Pt is 81 y.o. female seen for PT evaluation s/p admit to [address] [place] [address] on 8/18/2023. Two pt identifiers were used to confirm. Pt presented w/ syncope. Pt was admitted with a primary dx of: syncope, and other active problems including CKD, GERD, mixed HLD, benign essential HTN. PT now consulted for assessment of mobility and d/c needs. Pt with Activity as tolerated orders. 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 Pts current clinical presentation is Evolving (medium complexity) due to Ongoing medical management for primary dx, Fall risk, Continuous pulse oximetry monitoring   . Upon evaluation, pt currently is requiring Supervision for bed mobility; Supervision for transfers and Supervision for ambulation w/ no AD. Pt presents at PT eval functioning below baseline and currently w/ overall mobility deficits 2* to: decreased activity tolerance compared to baseline, fall risk. At conclusion of PT session pt returned BTB with phone and call bell within reach. Pt denies any further questions at this time.  PT is currently recommending Home with family support. D/C acute care PT at this time due to pt being near supervision with all mobility and having supportive spouse who is able to assist as needed. Pt denies any mobility or safety concerns about returning home at d/c. Recommend pt continues to mobilize with nsg and restorative techs during hospital stay. Barriers to Discharge None   Barriers to Discharge Comments Pt denies any mobility or safety concerns about returning home at time of d/c   Goals   Patient Goals " to go home   Plan   PT Frequency   (DC IPPT)   Recommendation   PT Discharge Recommendation No rehabilitation needs   Equipment Recommended   (none at this time)   Additional Comments Pt denies any mobility or safety concerns about returning home at time of d/c   AM-PAC Basic Mobility Inpatient   Turning in Flat Bed Without Bedrails 4   Lying on Back to Sitting on Edge of Flat Bed Without Bedrails 4   Moving Bed to Chair 4   Standing Up From Chair Using Arms 4   Walk in Room 4   Climb 3-5 Stairs With Railing 4   Basic Mobility Inpatient Raw Score 24   Basic Mobility Standardized Score 57.68   Highest Level Of Mobility   JH-HLM Goal 8: Walk 250 feet or more   JH-HLM Achieved 7: Walk 25 feet or more   Modified McGregor Scale   Modified McGregor Scale 1   Barthel Index   Feeding 10   Bathing 5   Grooming Score 5   Dressing Score 10   Bladder Score 10   Bowels Score 10   Toilet Use Score 10   Transfers (Bed/Chair) Score 15   Mobility (Level Surface) Score 15   Stairs Score 10   Barthel Index Score 100   Portions of the documentation may have been created using voice recognition software. Occasional wrong word or sound alike substitutions may have occurred due to the inherent limitations of the voice recognition software. Read the chart carefully and recognize, using context, where substitutions have occurred.     Sulma Hinojosa, PT, DPT

## 2023-08-19 NOTE — DISCHARGE SUMMARY
INTERNAL MEDICINE RESIDENCY DISCHARGE SUMMARY     Eleazar Velazco   81 y.o. female  MRN: [id number]  Room/Bed: [facility] 216/[facility] 216-01 [address] [facility]   Encounter: [id number]    Principal Problem:    Syncope  Active Problems:    Benign essential hypertension    Mixed hyperlipidemia    Gastroesophageal reflux disease without esophagitis    CKD (chronic kidney disease) stage 3, GFR 30-59 ml/min ([facility])      * Syncope  Assessment & Plan  Patient is presenting with 2 episodes of syncope this morning while walking after. Recent prescription for Flexeril for neck pain  Patient has history of vasovagal symptoms and passing out concerns stimuli that caused her to be uneasy last occurring about 3 years ago. Patient denies any palpitations, lightheadedness with change in position, decreased p.o. intake, symptoms of anxiety, exhaustion, seizure-like activities, history of migraine or headaches, focal weakness  Symptoms likely secondary to newly prescribed Flexeril versus vasovagal  Last echocardiogram in 2020 showed EF 60% with no significant valvular abnormalities  CTA of the head negative for any acute processes  Orthostatics negative, no events on telemetry, continues to report being asymptomatic at this time    Plan: We will avoid muscle relaxants  Recheck echocardiogram outpt    CKD (chronic kidney disease) stage 3, GFR 30-59 ml/min [facility])  Assessment & Plan  Lab Results   Component Value Date    EGFR 74 08/18/2023    EGFR 67 08/02/2021    EGFR 56 12/03/2020    CREATININE 0.73 08/18/2023    CREATININE 0.81 08/02/2021    CREATININE 0.94 12/03/2020     History of CKD with no acute kidney injury    Plan:    We will continue monitoring kidney function and avoid nephrotoxic agents    Gastroesophageal reflux disease without esophagitis  Assessment & Plan  History of GERD well maintained with omeprazole 40 mg daily at home    Plan  Continue PPI     Mixed hyperlipidemia  Assessment & Plan  History of mixed hyperlipidemia last lipid panel checked in 2021 well maintained on atorvastatin 20 mg daily    Plan  Will continue home statin    Benign essential hypertension  Assessment & Plan  History of hypertension maintained on amlodipine 5 mg daily  Presenting to the ED not having taken her home blood pressure medications with elevated blood pressures with systolics in the 619P; does not appear to be causing the patient's syncope as she does not endorse orthostatic symptoms and is a chronic medication    Plan: We will continue home amlodipine 5 mg daily on discharge          Neck pain-resolved as of 8/19/2023  Assessment & Plan  Patient reports waking up one  morning with a stiff neck after a skin biopsy and being prescribed Flexeril which did not improve her pain however made her feel weak. 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 [facility]SE     The patient is a 78-year-old male with past medical history of hypertension, GERD, osteoporosis, CKD, hyperlipidemia, prediabetes presented with 2 episodes syncope in the morning while walking to go to the restroom. She reports having tried Flexeril given to her by her PCP for torticollis that started after skin biopsy of her chest.  She reported feeling very weak in the morning of and reported being slowly lowered to the ground by her  without any trauma. She denied any heart palpitations, lightheadedness, symptoms with change in position, room spinning sensation, decreased p.o. intake, anxiety, exhaustion, seizure activity, headaches, or focal weakness. She does report history of syncope about 3 years ago which occurred due to an unpleasant stimuli.   She also states she has very sensitive to medications. Patient was observed on telemetry with no events. Orthostatics were negative. Patient denied any repeat episodes and no difficulty with ambulation. She was hemodynamically stable throughout her entire stay. Labs were unrevealing. We recommended an echo although examination and previous echocardiogram did not warrant immediate image and will defer outpatient. At time of discharge patient is stable. She denies any fevers, chills, nausea, vomiting, shortness of breath, chest pain, dizziness, lightheadedness, focal weakness, headache, seizure-like activity. She is instructed to stop Flexeril on discharge and maintain good p.o. intake. Patient will require outpatient follow-up with PCP and possible echo. Physical Exam  Constitutional:       General: She is not in acute distress. Appearance: Normal appearance. She is normal weight. She is not ill-appearing or toxic-appearing. HENT:      Head: Normocephalic and atraumatic. Cardiovascular:      Rate and Rhythm: Normal rate and regular rhythm. Heart sounds: No murmur heard. No gallop. Pulmonary:      Effort: No respiratory distress. Breath sounds: No wheezing or rales. Abdominal:      General: Abdomen is flat. There is no distension. Tenderness: There is no abdominal tenderness. There is no guarding. Musculoskeletal:      Right lower leg: No edema. Left lower leg: No edema. Neurological:      Mental Status: She is alert and oriented to person, place, and time.    Psychiatric:         Mood and Affect: Mood normal.         Behavior: Behavior normal.           DISCHARGE INFORMATION     PCP at Discharge: Patricia Ledesma MD    Admitting Provider: Miguel Meneses MD  Admission Date: 8/18/2023    Discharge Provider: Miguel Meneses*  Discharge Date: 8/19/23    Discharge Disposition: Final discharge disposition not confirmed  Discharge Condition: fair  Discharge with Lines: no    Discharge Diet: regular diet  Activity Restrictions: none  Test Results Pending at Discharge: Echo which will defer for outpatient    Discharge Diagnoses:  Principal Problem:    Syncope  Active Problems:    Benign essential hypertension    Mixed hyperlipidemia    Gastroesophageal reflux disease without esophagitis    CKD (chronic kidney disease) stage 3, GFR 30-59 ml/min ([facility])  Resolved Problems:    Neck pain      Consulting Providers:      Diagnostic & Therapeutic Procedures Performed:  CTA head and neck with and without contrast    Result Date: 8/18/2023  Impression: No mass effect, acute intracranial hemorrhage or CT evidence for a large acute vascular distribution infarct. Mild chronic microvascular ischemic change. Stable calcified lesion along the right corona radiata. No evidence for high-grade stenosis, focal occlusion or vascular aneurysm of the cervical or intracranial vessels.  Workstation performed: [id number]       Code Status: Level 1 - Full Code  Advance Directive & Living Will: <no information>  Power of :    POLST:      Medications:  Current Discharge Medication List      STOP taking these medications       cyclobenzaprine (FLEXERIL) 5 mg tablet Comments:   Reason for Stopping:         methylPREDNISolone 4 MG tablet therapy pack Comments:   Reason for Stopping:             Current Discharge Medication List        Current Discharge Medication List      CONTINUE these medications which have NOT CHANGED    Details   amLODIPine (NORVASC) 5 mg tablet Take 1 tablet (5 mg total) by mouth daily  Qty: 90 tablet, Refills: 1    Associated Diagnoses: Essential hypertension      atorvastatin (LIPITOR) 20 mg tablet Take 1 tablet (20 mg total) by mouth daily  Qty: 90 tablet, Refills: 1    Associated Diagnoses: Mixed hyperlipidemia      Diclofenac Sodium (VOLTAREN) 1 % Apply 2 g topically 4 (four) times a day  Qty: 1 Tube, Refills: 2    Associated Diagnoses: Pain in both feet      LORazepam (ATIVAN) 0.5 mg tablet Take 1 tablet (0.5 mg total) by mouth as needed (help sleep)  Qty: 30 tablet, Refills: 0    Associated Diagnoses: Insomnia, unspecified type      Multiple Vitamins-Minerals (PRESERVISION AREDS 2) CAPS Take 1 capsule by mouth daily  Qty: 90 capsule, Refills: 1    Associated Diagnoses: Macular degeneration, unspecified laterality, unspecified type      omeprazole (PriLOSEC) 40 MG capsule Take 1 capsule (40 mg total) by mouth daily  Qty: 90 capsule, Refills: 1    Associated Diagnoses: Gastroesophageal reflux disease without esophagitis      acetaminophen (TYLENOL) 500 mg tablet Take 1 tablet (500 mg total) by mouth every 4 (four) hours as needed (knee and back pain)  Qty: 90 tablet, Refills: 1    Associated Diagnoses: Pain in both knees, unspecified chronicity      calcium carbonate (OS-VIDA) 600 MG tablet Take 600 mg by mouth 2 (two) times a day with meals       Cholecalciferol (VITAMIN D3 PO) Take 1 capsule by mouth daily 2000      Cranberry-Vitamin C-Probiotic (AZO CRANBERRY PO) Take by mouth as needed             Allergies: Allergies   Allergen Reactions   • Daypro [Oxaprozin]      Unknown allergic reaction   • Minocycline Other (See Comments)     Loss of taste       FOLLOW-UP     PCP Outpatient Follow-up:  follow-up with your PCP within 1 week    Consulting Providers Follow-up:  none required     Active Issues Requiring Follow-up:   Syncope, and muscle stiffness    Discharge Statement:   I spent 45 minutes minutes discharging the patient. This time was spent on the day of discharge. I had direct contact with the patient on the day of discharge. Additional documentation is required if more than 30 minutes were spent on discharge. Portions of the record may have been created with voice recognition software. Occasional wrong word or "sound a like" substitutions may have occurred due to the inherent limitations of voice recognition software.   Read the chart carefully and recognize, using context, where substitutions have occurred.    ==  Het D Chika Bruno  Internal Medicine Resident PGY-2

## 2023-08-19 NOTE — ED PROVIDER NOTES
History  Chief Complaint   Patient presents with   • Syncope     Pt reports severe neck pain after having chest biopsy on 8/15.  reports syncopal episode where he lowered patient to the ground. CESAR Rodríguez is a 81 y.o. female who presents to the emergency department with syncope. She states she was walking to the bathroom this morning and felt lightheadedness and passed out, her  caught her and lowered her to the ground so she did not have head strike. She states later in the morning she had a second syncopal episode and was again lowered to the ground by her  without head strike, she may not have had prodromal symptoms in this second episode. She denies chest pain or shortness of breath. She has had neck pain since having a biopsy on her chest several days ago when she jerked her neck due to the pain. She denies weakness or numbness. Prior to Admission Medications   Prescriptions Last Dose Informant Patient Reported? Taking?    Cholecalciferol (VITAMIN D3 PO) Unknown Self Yes No   Sig: Take 1 capsule by mouth daily 2000   Cranberry-Vitamin C-Probiotic (AZO CRANBERRY PO) Unknown Self Yes No   Sig: Take by mouth as needed   Diclofenac Sodium (VOLTAREN) 1 % 8/17/2023 Self No Yes   Sig: Apply 2 g topically 4 (four) times a day   LORazepam (ATIVAN) 0.5 mg tablet 8/17/2023 Self No Yes   Sig: Take 1 tablet (0.5 mg total) by mouth as needed (help sleep)   Multiple Vitamins-Minerals (PRESERVISION AREDS 2) CAPS 8/17/2023 Self No Yes   Sig: Take 1 capsule by mouth daily   acetaminophen (TYLENOL) 500 mg tablet Unknown Self No No   Sig: Take 1 tablet (500 mg total) by mouth every 4 (four) hours as needed (knee and back pain)   amLODIPine (NORVASC) 5 mg tablet 8/17/2023 Self No Yes   Sig: Take 1 tablet (5 mg total) by mouth daily   atorvastatin (LIPITOR) 20 mg tablet 8/17/2023 Self No Yes   Sig: Take 1 tablet (20 mg total) by mouth daily   calcium carbonate (OS-VIDA) 600 MG tablet Unknown Self Yes No   Sig: Take 600 mg by mouth 2 (two) times a day with meals    cyclobenzaprine (FLEXERIL) 5 mg tablet 8/17/2023  No Yes   Sig: Take 1 tablet (5 mg total) by mouth 2 (two) times a day as needed for muscle spasms for up to 10 days   methylPREDNISolone 4 MG tablet therapy pack Not Taking Self No No   Sig: Use as directed on package   Patient not taking: Reported on 4/25/2023   omeprazole (PriLOSEC) 40 MG capsule 8/17/2023 Self No Yes   Sig: Take 1 capsule (40 mg total) by mouth daily      Facility-Administered Medications: None       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 E-Cigarette/Vaping   • E-Cigarette Use Never User      E-Cigarette/Vaping Substances   • Nicotine No    • THC No    • CBD No    • Flavoring No    • Other No    • Unknown No      Social History     Tobacco Use   • Smoking status: Never   • Smokeless tobacco: Never   Vaping Use   • Vaping Use: Never used   Substance Use Topics   • Alcohol use: Yes     Comment: socially   • Drug use: No       Home medications:  Prior to Admission Medications   Prescriptions Last Dose Informant Patient Reported? Taking?    Cholecalciferol (VITAMIN D3 PO) Unknown Self Yes No   Sig: Take 1 capsule by mouth daily 2000   Cranberry-Vitamin C-Probiotic (AZO CRANBERRY PO) Unknown Self Yes No   Sig: Take by mouth as needed   Diclofenac Sodium (VOLTAREN) 1 % 8/17/2023 Self No Yes   Sig: Apply 2 g topically 4 (four) times a day   LORazepam (ATIVAN) 0.5 mg tablet 8/17/2023 Self No Yes   Sig: Take 1 tablet (0.5 mg total) by mouth as needed (help sleep)   Multiple Vitamins-Minerals (PRESERVISION AREDS 2) CAPS 8/17/2023 Self No Yes   Sig: Take 1 capsule by mouth daily   acetaminophen (TYLENOL) 500 mg tablet Unknown Self No No   Sig: Take 1 tablet (500 mg total) by mouth every 4 (four) hours as needed (knee and back pain)   amLODIPine (NORVASC) 5 mg tablet 8/17/2023 Self No Yes   Sig: Take 1 tablet (5 mg total) by mouth daily   atorvastatin (LIPITOR) 20 mg tablet 8/17/2023 Self No Yes   Sig: Take 1 tablet (20 mg total) by mouth daily   calcium carbonate (OS-VIDA) 600 MG tablet Unknown Self Yes No   Sig: Take 600 mg by mouth 2 (two) times a day with meals    cyclobenzaprine (FLEXERIL) 5 mg tablet 8/17/2023  No Yes   Sig: Take 1 tablet (5 mg total) by mouth 2 (two) times a day as needed for muscle spasms for up to 10 days   methylPREDNISolone 4 MG tablet therapy pack Not Taking Self No No   Sig: Use as directed on package   Patient not taking: Reported on 4/25/2023   omeprazole (PriLOSEC) 40 MG capsule 8/17/2023 Self No Yes   Sig: Take 1 capsule (40 mg total) by mouth daily      Facility-Administered Medications: None     Allergies: Allergies   Allergen Reactions   • Daypro [Oxaprozin]      Unknown allergic reaction   • Minocycline Other (See Comments)     Loss of taste        Review of Systems   Constitutional: Negative for fever. Respiratory: Negative for shortness of breath. Cardiovascular: Negative for chest pain and leg swelling. Gastrointestinal: Negative for abdominal pain, nausea and vomiting. Genitourinary: Negative for dysuria. Musculoskeletal: Positive for neck pain.  Negative for back pain.   Neurological: Positive for syncope and light-headedness. Negative for dizziness, weakness, numbness and headaches. All other systems reviewed and are negative. Physical Exam  ED Triage Vitals   Temperature Pulse Respirations Blood Pressure SpO2   08/18/23 0822 08/18/23 0815 08/18/23 0815 08/18/23 0815 08/18/23 0815   97.6 °F (36.4 °C) 66 20 (!) 173/72 99 %      Temp Source Heart Rate Source Patient Position - Orthostatic VS BP Location FiO2 (%)   08/18/23 0822 08/18/23 0815 08/18/23 1653 08/18/23 0815 --   Oral Monitor Lying - Orthostatic VS Right arm       Pain Score       08/18/23 0815       8             Orthostatic Vital Signs  Vitals:    08/18/23 1656 08/18/23 1850 08/18/23 2210 08/19/23 0510   BP: 137/64 155/68 152/71 155/70   Pulse: 83 73 72 73   Patient Position - Orthostatic VS:    Lying       Physical Exam  Vitals and nursing note reviewed. Constitutional:       General: She is not in acute distress. Appearance: She is not toxic-appearing or diaphoretic. HENT:      Head: Normocephalic and atraumatic. Mouth/Throat:      Mouth: Mucous membranes are moist.   Eyes:      Extraocular Movements: Extraocular movements intact. Pupils: Pupils are equal, round, and reactive to light. Cardiovascular:      Rate and Rhythm: Normal rate and regular rhythm. Pulmonary:      Effort: Pulmonary effort is normal. No respiratory distress. Breath sounds: No wheezing, rhonchi or rales. Abdominal:      General: Abdomen is flat. There is no distension. Palpations: Abdomen is soft. Tenderness: There is no abdominal tenderness. There is no guarding or rebound. Musculoskeletal:      Cervical back: Tenderness present. Comments: Midline posterior C-spine tenderness, no midline T or L-spine tenderness. No focal tenderness along sides of neck, unable to reproduce right-sided neck pain with palpation. Skin:     General: Skin is warm and dry.    Neurological:      Mental Status: She is alert. Cranial Nerves: No cranial nerve deficit. Sensory: No sensory deficit (Light touch sensation intact symmetric throughout bilateral upper and lower extremities and face. ). Motor: No weakness (Motor strength 5/5 intact symmetric for bilateral upper and lower extremities).          ED Medications  Medications   acetaminophen (TYLENOL) tablet 650 mg (650 mg Oral Given 8/19/23 0610)   amLODIPine (NORVASC) tablet 5 mg (5 mg Oral Given 8/19/23 0921)   atorvastatin (LIPITOR) tablet 20 mg (20 mg Oral Given 8/19/23 0921)   pantoprazole (PROTONIX) EC tablet 40 mg (40 mg Oral Given 8/19/23 0608)   enoxaparin (LOVENOX) subcutaneous injection 40 mg (40 mg Subcutaneous Not Given 8/19/23 0923)   lidocaine (LIDODERM) 5 % patch 1 patch (1 patch Topical Medication Applied 8/19/23 0921)   iohexol (OMNIPAQUE) 350 MG/ML injection (SINGLE-DOSE) 85 mL (85 mL Intravenous Given 8/18/23 1134)   acetaminophen (TYLENOL) tablet 975 mg (975 mg Oral Given 8/18/23 1211)       Diagnostic Studies  Results Reviewed     Procedure Component Value Units Date/Time    CBC and differential [[id number]]  (Abnormal) Collected: 08/19/23 0611    Lab Status: Final result Specimen: Blood from Arm, Left Updated: 08/19/23 0623     WBC 10.34 Thousand/uL      RBC 4.57 Million/uL      Hemoglobin 12.9 g/dL      Hematocrit 40.1 %      MCV 88 fL      MCH 28.2 pg      MCHC 32.2 g/dL      RDW 12.6 %      MPV 10.2 fL      Platelets 906 Thousands/uL      nRBC 0 /100 WBCs      Neutrophils Relative 65 %      Immat GRANS % 0 %      Lymphocytes Relative 24 %      Monocytes Relative 10 %      Eosinophils Relative 1 %      Basophils Relative 0 %      Neutrophils Absolute 6.72 Thousands/µL      Immature Grans Absolute 0.04 Thousand/uL      Lymphocytes Absolute 2.49 Thousands/µL      Monocytes Absolute 0.98 Thousand/µL      Eosinophils Absolute 0.07 Thousand/µL      Basophils Absolute 0.04 Thousands/µL     Basic metabolic panel [[id number]] Collected: 08/19/23 0516    Lab Status: Final result Specimen: Blood from Arm, Left Updated: 08/19/23 0618     Sodium 136 mmol/L      Potassium 3.9 mmol/L      Chloride 106 mmol/L      CO2 25 mmol/L      ANION GAP 5 mmol/L      BUN 14 mg/dL      Creatinine 0.69 mg/dL      Glucose 107 mg/dL      Calcium 9.7 mg/dL      eGFR 78 ml/min/1.73sq m     Narrative:      [facility] guidelines for Chronic Kidney Disease (CKD):   •  Stage 1 with normal or high GFR (GFR > 90 mL/min/1.73 square meters)  •  Stage 2 Mild CKD (GFR = 60-89 mL/min/1.73 square meters)  •  Stage 3A Moderate CKD (GFR = 45-59 mL/min/1.73 square meters)  •  Stage 3B Moderate CKD (GFR = 30-44 mL/min/1.73 square meters)  •  Stage 4 Severe CKD (GFR = 15-29 mL/min/1.73 square meters)  •  Stage 5 End Stage CKD (GFR <15 mL/min/1.73 square meters)  Note: GFR calculation is accurate only with a steady state creatinine    HS Troponin I 4hr [[id number]]  (Normal) Collected: 08/18/23 1703    Lab Status: Final result Specimen: Blood from Arm, Right Updated: 08/18/23 1739     hs TnI 4hr 5 ng/L      Delta 4hr hsTnI -2 ng/L     HS Troponin I 2hr [[id number]]  (Normal) Collected: 08/18/23 1213    Lab Status: Final result Specimen: Blood from Arm, Right Updated: 08/18/23 1254     hs TnI 2hr 7 ng/L      Delta 2hr hsTnI 0 ng/L     HS Troponin 0hr (reflex protocol) [[id number]]  (Normal) Collected: 08/18/23 1016    Lab Status: Final result Specimen: Blood from Arm, Right Updated: 08/18/23 1106     hs TnI 0hr 7 ng/L     Comprehensive metabolic panel [[id number]]  (Abnormal) Collected: 08/18/23 1016    Lab Status: Final result Specimen: Blood from Arm, Right Updated: 08/18/23 1052     Sodium 136 mmol/L      Potassium 3.9 mmol/L      Chloride 107 mmol/L      CO2 25 mmol/L      ANION GAP 4 mmol/L      BUN 12 mg/dL      Creatinine 0.73 mg/dL      Glucose 135 mg/dL      Calcium 9.6 mg/dL      Corrected Calcium 10.1 mg/dL      AST 11 U/L      ALT 12 U/L      Alkaline Phosphatase 65 U/L      Total Protein 7.3 g/dL      Albumin 3.4 g/dL      Total Bilirubin 0.58 mg/dL      eGFR 74 ml/min/1.73sq m     Narrative:      [employer] guidelines for Chronic Kidney Disease (CKD):   •  Stage 1 with normal or high GFR (GFR > 90 mL/min/1.73 square meters)  •  Stage 2 Mild CKD (GFR = 60-89 mL/min/1.73 square meters)  •  Stage 3A Moderate CKD (GFR = 45-59 mL/min/1.73 square meters)  •  Stage 3B Moderate CKD (GFR = 30-44 mL/min/1.73 square meters)  •  Stage 4 Severe CKD (GFR = 15-29 mL/min/1.73 square meters)  •  Stage 5 End Stage CKD (GFR <15 mL/min/1.73 square meters)  Note: GFR calculation is accurate only with a steady state creatinine    CBC and differential [[id number]]  (Abnormal) Collected: 08/18/23 1016    Lab Status: Final result Specimen: Blood from Arm, Right Updated: 08/18/23 1025     WBC 12.85 Thousand/uL      RBC 4.60 Million/uL      Hemoglobin 13.0 g/dL      Hematocrit 40.3 %      MCV 88 fL      MCH 28.3 pg      MCHC 32.3 g/dL      RDW 12.6 %      MPV 10.3 fL      Platelets 072 Thousands/uL      nRBC 0 /100 WBCs      Neutrophils Relative 72 %      Immat GRANS % 1 %      Lymphocytes Relative 19 %      Monocytes Relative 8 %      Eosinophils Relative 0 %      Basophils Relative 0 %      Neutrophils Absolute 9.39 Thousands/µL      Immature Grans Absolute 0.07 Thousand/uL      Lymphocytes Absolute 2.38 Thousands/µL      Monocytes Absolute 0.98 Thousand/µL      Eosinophils Absolute 0.00 Thousand/µL      Basophils Absolute 0.03 Thousands/µL                  CTA head and neck with and without contrast   Final Result by Stephen Espino MD (08/18 1152)      No mass effect, acute intracranial hemorrhage or CT evidence for a large acute vascular distribution infarct. Mild chronic microvascular ischemic change. Stable calcified lesion along the right corona radiata.       No evidence for high-grade stenosis, focal occlusion or vascular aneurysm of the cervical or intracranial vessels. Workstation performed: [id number]               Procedures  Procedures      ED Course               Identification of Seniors at [facility] Most Recent Value   (ISAR) Identification of Seniors at Risk    Before the illness or injury that brought you to the Emergency, did you need someone to help you on a regular basis? 0 Filed at: 08/18/2023 0818   In the last 24 hours, have you needed more help than usual? 0 Filed at: 08/18/2023 4587   Have you been hospitalized for one or more nights during the past 6 months? 0 Filed at: 08/18/2023 0818   In general, do you see well? 0 Filed at: 08/18/2023 0818   In general, do you have serious problems with your memory? 0 Filed at: 08/18/2023 2977   Do you take more than three different medications every day? 1 Filed at: 08/18/2023 0818   ISAR Score 1 Filed at: 08/18/2023 0818                              MDM  MDM  Stewart Rodríguez is a 81 y.o. female who presents to the emergency department with syncope, neck pain. Workup including vital signs, physical exam, EKG, labs and CT. EKG without acute ischemic changes or arrhythmia, and labs largely unremarkable. CTA head and neck done given neck pain after history of sudden jerking of neck, and now multiple syncopal episodes. CTA without evidence of fracture or vascular injury. Given age, multiple episodes of syncope, lack of prodromal symptoms on second episode, plan for admission to medicine.  .       Disposition  Final diagnoses:   Syncope     Time reflects when diagnosis was documented in both MDM as applicable and the Disposition within this note     Time User Action Codes Description Comment    8/18/2023  1:35 PM Funmi Moon Add [R55] Syncope       ED Disposition     ED Disposition   Admit    Condition   Stable    Date/Time   Fri Aug 18, 2023  1:35 PM    Comment   Case was discussed with internal medicine and the patient's admission status was agreed to be Admission Status: observation status to the service of Dr. Miguel Gooden .            Follow-up Information     Follow up With Specialties Details Why Contact Info    Sudha Swan MD Internal Medicine Schedule an appointment as soon as possible for a visit in 1 week(s)  [address]  [phone]            Discharge Medication List as of 8/19/2023 10:49 AM      CONTINUE these medications which have NOT CHANGED    Details   amLODIPine (NORVASC) 5 mg tablet Take 1 tablet (5 mg total) by mouth daily, Starting Tue 7/11/2023, Normal      atorvastatin (LIPITOR) 20 mg tablet Take 1 tablet (20 mg total) by mouth daily, Starting Tue 7/11/2023, Normal      Diclofenac Sodium (VOLTAREN) 1 % Apply 2 g topically 4 (four) times a day, Starting Tue 1/12/2021, Normal      LORazepam (ATIVAN) 0.5 mg tablet Take 1 tablet (0.5 mg total) by mouth as needed (help sleep), Starting Tue 7/11/2023, Normal      Multiple Vitamins-Minerals (PRESERVISION AREDS 2) CAPS Take 1 capsule by mouth daily, Starting Mon 1/6/2020, Normal      omeprazole (PriLOSEC) 40 MG capsule Take 1 capsule (40 mg total) by mouth daily, Starting Tue 4/25/2023, Normal      acetaminophen (TYLENOL) 500 mg tablet Take 1 tablet (500 mg total) by mouth every 4 (four) hours as needed (knee and back pain), Starting Mon 12/3/2018, Normal      calcium carbonate (OS-VIDA) 600 MG tablet Take 600 mg by mouth 2 (two) times a day with meals , Historical Med      Cholecalciferol (VITAMIN D3 PO) Take 1 capsule by mouth daily 2000, Historical Med      Cranberry-Vitamin C-Probiotic (AZO CRANBERRY PO) Take by mouth as needed, Historical Med         STOP taking these medications       cyclobenzaprine (FLEXERIL) 5 mg tablet Comments:   Reason for Stopping:         methylPREDNISolone 4 MG tablet therapy pack Comments:   Reason for Stopping:               Outpatient Discharge Orders   Activity as tolerated     Call provider for:  persistent dizziness or light-headedness Call provider for:       PDMP Review     None           ED Provider  Attending physically available and evaluated Christy Boateng. I managed the patient along with the ED Attending. Electronically Signed by    Portions of the record may have been created with voice recognition software. Occasional wrong word or "sound a like" substitutions may have occurred due to the inherent limitations of voice recognition software.   Read the chart carefully and recognize, using context, where substitutions have occurred     Tracye Earl MD  08/19/23 2444

## 2023-08-21 ENCOUNTER — TRANSITIONAL CARE MANAGEMENT (OUTPATIENT)
Dept: INTERNAL MEDICINE CLINIC | Facility: OTHER | Age: 88
End: 2023-08-21

## 2023-08-22 ENCOUNTER — TELEPHONE (OUTPATIENT)
Dept: INTERNAL MEDICINE CLINIC | Facility: OTHER | Age: 88
End: 2023-08-22

## 2023-08-22 ENCOUNTER — OFFICE VISIT (OUTPATIENT)
Dept: INTERNAL MEDICINE CLINIC | Facility: OTHER | Age: 88
End: 2023-08-22
Payer: COMMERCIAL

## 2023-08-22 VITALS
TEMPERATURE: 98.4 F | WEIGHT: 126 LBS | HEIGHT: 63 IN | OXYGEN SATURATION: 99 % | DIASTOLIC BLOOD PRESSURE: 68 MMHG | SYSTOLIC BLOOD PRESSURE: 128 MMHG | HEART RATE: 71 BPM | BODY MASS INDEX: 22.32 KG/M2

## 2023-08-22 DIAGNOSIS — M25.511 ACUTE PAIN OF RIGHT SHOULDER: ICD-10-CM

## 2023-08-22 DIAGNOSIS — R55 SYNCOPE, UNSPECIFIED SYNCOPE TYPE: Primary | ICD-10-CM

## 2023-08-22 PROCEDURE — 99496 TRANSJ CARE MGMT HIGH F2F 7D: CPT | Performed by: INTERNAL MEDICINE

## 2023-08-22 NOTE — TELEPHONE ENCOUNTER
Patient stated she would like to start Prolia injections.  Was in contact with someone at her insurance company and they stated Prolia is covered at 100%     Please advise for patient   Thank you

## 2023-08-22 NOTE — PROGRESS NOTES
Assessment & Plan     1. Syncope, unspecified syncope type  Assessment & Plan:  Likely medication induced (flexeril). Symptoms have resolved. 2. Acute pain of right shoulder  Assessment & Plan:  Recommended she schedule an appointment and start PT. Continue tylenol PRN. Subjective     Transitional Care Management Review: Yana Hernandez is a 81 y.o. female here for TCM follow up. During the TCM phone call patient stated:  TCM Call     Date and time call was made  8/21/2023  9:09 AM    Hospital care reviewed  Records reviewed    Patient was hospitialized at  [address] [place] [address]    Date of Admission  08/18/23    Date of discharge  08/19/23    Diagnosis  syncope    Disposition  Home    Were the patients medications reviewed and updated  No  patient was not at home when I called    Current Symptoms  Headache; Back pain - right side  right side in back on neck    Headache pain severity  Mild      TCM Call     Post hospital issues  None    Should patient be enrolled in anticoag monitoring? No    Scheduled for follow up? Yes    Did you obtain your prescribed medications  Yes    Do you need help managing your prescriptions or medications  No    Is transportation to your appointment needed  No    I have advised the patient to call PCP with any new or worsening symptoms  Sid Herminia WALLACE        43-year-old female seen today for hospitalization follow-up to which she was hospitalized at [address] [place] [address]. Hospitalization and discharge summary reviewed today. She presented after having 2 episodes of syncope earlier that morning. She was prescribed Flexeril the day prior for cervical neck muscle spasms. CTA head and neck was negative for acute intracranial abnormality. No cardiac events were appreciated on telemetry. Laboratory studies were inconclusive. She remained hemodynamically stable throughout hospitalization.   It was felt her syncope may have been vasovagal vs medication induced (flexeril). Review of Systems   Constitutional: Negative for activity change, appetite change, chills, diaphoresis, fatigue and fever. HENT: Negative for congestion, postnasal drip, rhinorrhea, sinus pressure, sinus pain, sneezing and sore throat. Eyes: Negative for visual disturbance. Respiratory: Negative for apnea, cough, choking, chest tightness, shortness of breath and wheezing. Cardiovascular: Negative for chest pain, palpitations and leg swelling. Gastrointestinal: Negative for abdominal distention, abdominal pain, anal bleeding, blood in stool, constipation, diarrhea, nausea and vomiting. Endocrine: Negative for cold intolerance and heat intolerance. Genitourinary: Negative for difficulty urinating, dysuria and hematuria. Musculoskeletal: Negative. Skin: Negative. Neurological: Negative for dizziness, weakness, light-headedness, numbness and headaches. Hematological: Negative for adenopathy. Psychiatric/Behavioral: Negative for agitation, sleep disturbance and suicidal ideas. All other systems reviewed and are negative. Objective     /68 (BP Location: Left arm, Patient Position: Sitting, Cuff Size: Adult)   Pulse 71   Temp 98.4 °F (36.9 °C)   Ht 5' 3" (1.6 m)   Wt 57.2 kg (126 lb)   SpO2 99%   BMI 22.32 kg/m²      Physical Exam  Vitals and nursing note reviewed. Constitutional:       General: She is not in acute distress. Appearance: Normal appearance. She is normal weight. She is not ill-appearing. HENT:      Head: Normocephalic and atraumatic. Right Ear: Tympanic membrane, ear canal and external ear normal. There is no impacted cerumen. Left Ear: Tympanic membrane, ear canal and external ear normal. There is no impacted cerumen. Nose: Nose normal. No congestion or rhinorrhea. Mouth/Throat:      Mouth: Mucous membranes are moist.      Pharynx: Oropharynx is clear.  No oropharyngeal exudate or posterior oropharyngeal erythema. Eyes:      General: No scleral icterus. Right eye: No discharge. Left eye: No discharge. Extraocular Movements: Extraocular movements intact. Conjunctiva/sclera: Conjunctivae normal.      Pupils: Pupils are equal, round, and reactive to light. Neck:      Vascular: No carotid bruit. Cardiovascular:      Rate and Rhythm: Normal rate and regular rhythm. Pulses: Normal pulses. Heart sounds: Normal heart sounds. No murmur heard. No friction rub. No gallop. Pulmonary:      Effort: Pulmonary effort is normal. No respiratory distress. Breath sounds: Normal breath sounds. No stridor. No wheezing, rhonchi or rales. Chest:      Chest wall: No tenderness. Abdominal:      General: Abdomen is flat. Bowel sounds are normal. There is no distension. Palpations: Abdomen is soft. There is no mass. Tenderness: There is no abdominal tenderness. Hernia: No hernia is present. Musculoskeletal:         General: No swelling, tenderness, deformity or signs of injury. Normal range of motion. Cervical back: Normal range of motion and neck supple. No rigidity. No muscular tenderness. Right lower leg: No edema. Left lower leg: No edema. Lymphadenopathy:      Cervical: No cervical adenopathy. Skin:     General: Skin is warm and dry. Capillary Refill: Capillary refill takes less than 2 seconds. Coloration: Skin is not jaundiced or pale. Findings: No bruising, erythema, lesion or rash. Neurological:      General: No focal deficit present. Mental Status: She is alert and oriented to person, place, and time. Mental status is at baseline. Cranial Nerves: No cranial nerve deficit. Sensory: No sensory deficit. Motor: No weakness.       Coordination: Coordination normal.      Gait: Gait normal.      Deep Tendon Reflexes: Reflexes normal.   Psychiatric:         Mood and Affect: Mood normal.         Behavior: Behavior normal.         Thought Content:  Thought content normal.         Judgment: Judgment normal.       Medications have been reviewed by provider in current encounter    Joanna Draper MD

## 2023-09-10 ENCOUNTER — RA CDI HCC (OUTPATIENT)
Dept: OTHER | Facility: HOSPITAL | Age: 88
End: 2023-09-10

## 2023-09-10 NOTE — PROGRESS NOTES
[facility] [address] [facility] coding opportunities       Chart reviewed, no opportunity found: [address] B[address]S        Patients Insurance     Medicare Insurance: [employer]

## 2023-09-13 LAB — HBA1C MFR BLD HPLC: 6.4 %

## 2023-09-18 ENCOUNTER — OFFICE VISIT (OUTPATIENT)
Dept: INTERNAL MEDICINE CLINIC | Facility: OTHER | Age: 88
End: 2023-09-18
Payer: COMMERCIAL

## 2023-09-18 ENCOUNTER — TELEPHONE (OUTPATIENT)
Dept: INTERNAL MEDICINE CLINIC | Facility: OTHER | Age: 88
End: 2023-09-18

## 2023-09-18 VITALS
TEMPERATURE: 98 F | OXYGEN SATURATION: 99 % | DIASTOLIC BLOOD PRESSURE: 64 MMHG | HEIGHT: 63 IN | HEART RATE: 63 BPM | WEIGHT: 127 LBS | BODY MASS INDEX: 22.5 KG/M2 | SYSTOLIC BLOOD PRESSURE: 112 MMHG

## 2023-09-18 DIAGNOSIS — K21.9 GASTROESOPHAGEAL REFLUX DISEASE WITHOUT ESOPHAGITIS: ICD-10-CM

## 2023-09-18 DIAGNOSIS — M81.0 AGE-RELATED OSTEOPOROSIS WITHOUT CURRENT PATHOLOGICAL FRACTURE: Primary | ICD-10-CM

## 2023-09-18 DIAGNOSIS — I10 BENIGN ESSENTIAL HYPERTENSION: ICD-10-CM

## 2023-09-18 DIAGNOSIS — E55.9 VITAMIN D DEFICIENCY: ICD-10-CM

## 2023-09-18 DIAGNOSIS — I83.93 ASYMPTOMATIC VARICOSE VEINS OF BOTH LOWER EXTREMITIES: ICD-10-CM

## 2023-09-18 DIAGNOSIS — N18.31 STAGE 3A CHRONIC KIDNEY DISEASE (HCC): ICD-10-CM

## 2023-09-18 DIAGNOSIS — E78.2 MIXED HYPERLIPIDEMIA: ICD-10-CM

## 2023-09-18 PROBLEM — N39.0 RECURRENT UTI: Status: RESOLVED | Noted: 2020-10-22 | Resolved: 2023-09-18

## 2023-09-18 PROBLEM — R07.9 CHEST PAIN DUE TO GASTROINTESTINAL REFLUX DISEASE: Status: RESOLVED | Noted: 2023-04-25 | Resolved: 2023-09-18

## 2023-09-18 PROCEDURE — 99214 OFFICE O/P EST MOD 30 MIN: CPT | Performed by: INTERNAL MEDICINE

## 2023-09-18 PROCEDURE — 96372 THER/PROPH/DIAG INJ SC/IM: CPT | Performed by: INTERNAL MEDICINE

## 2023-09-18 RX ORDER — OMEPRAZOLE 40 MG/1
40 CAPSULE, DELAYED RELEASE ORAL DAILY
Qty: 90 CAPSULE | Refills: 1 | Status: SHIPPED | OUTPATIENT
Start: 2023-09-18

## 2023-09-18 NOTE — ASSESSMENT & PLAN NOTE
Lab Results   Component Value Date    EGFR 78 08/19/2023    EGFR 74 08/18/2023    EGFR 67 08/02/2021    CREATININE 0.69 08/19/2023    CREATININE 0.73 08/18/2023    CREATININE 0.81 08/02/2021   Renal function is stable.   We will continue to monitor

## 2023-09-18 NOTE — TELEPHONE ENCOUNTER
Patient had prolia shot given today, 9/18/23 and will need to be scheduled for her next one in 6 months.

## 2023-09-18 NOTE — PROGRESS NOTES
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 Asymptomatic    Benign essential hypertension  Blood pressure is stable on present regimen. Age-related osteoporosis without current pathological fracture  First Prolia injection given in left arm. Tolerated well. Continue with vitamin D3, calcium and weightbearing exercises. CKD (chronic kidney disease) stage 3, GFR 30-59 ml/min [facility])  Lab Results   Component Value Date    EGFR 78 08/19/2023    EGFR 74 08/18/2023    EGFR 67 08/02/2021    CREATININE 0.69 08/19/2023    CREATININE 0.73 08/18/2023    CREATININE 0.81 08/02/2021   Renal function is stable. We will continue to monitor    Prediabetes  Recent hemoglobin A1c 6.4. Advised for better diet control. Presently patient is not watching her sugar and carbohydrate       Diagnoses and all orders for this visit:    Age-related osteoporosis without current pathological fracture  -     denosumab (PROLIA) subcutaneous injection 60 mg    Gastroesophageal reflux disease without esophagitis  -     omeprazole (PriLOSEC) 40 MG capsule; Take 1 capsule (40 mg total) by mouth daily    Benign essential hypertension    Stage 3a chronic kidney disease ([address])  -     Basic metabolic panel; Future  -     Magnesium; Future  -     Phosphorus; Future    Vitamin D deficiency    Mixed hyperlipidemia    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               Subjective:          Patient ID: Adalberto Arreaga is a 81 y.o. female. Patient is here for her first Prolia injection and also have blood test done would like to discuss results.   Otherwise no new complaints      The following portions of the patient's history were reviewed and updated as appropriate: allergies, current medications, past family history, past medical history, past social history, past surgical history and problem list.    Review of Systems   Constitutional: Negative for fatigue and fever. HENT: Negative for congestion, ear discharge, ear pain, postnasal drip, sinus pressure, sore throat, tinnitus and trouble swallowing. Eyes: Negative for discharge, itching and visual disturbance. Respiratory: Negative for cough and shortness of breath. Cardiovascular: Negative for chest pain and palpitations. Gastrointestinal: Negative for abdominal pain, diarrhea, nausea and vomiting. Endocrine: Negative for cold intolerance and polyuria. Genitourinary: Negative for difficulty urinating, dysuria and urgency. Musculoskeletal: Positive for arthralgias. Negative for neck pain. Skin: Negative for rash. Allergic/Immunologic: Negative for environmental allergies. Neurological: Negative for dizziness, weakness and headaches. Psychiatric/Behavioral: The patient is not nervous/anxious.           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)     Last Assessed:3/18/2014         Current Outpatient Medications:   •  acetaminophen (TYLENOL) 500 mg tablet, Take 1 tablet (500 mg total) by mouth every 4 (four) hours as needed (knee and back pain), Disp: 90 tablet, Rfl: 1  •  amLODIPine (NORVASC) 5 mg tablet, Take 1 tablet (5 mg total) by mouth daily, Disp: 90 tablet, Rfl: 1  •  atorvastatin (LIPITOR) 20 mg tablet, Take 1 tablet (20 mg total) by mouth daily, Disp: 90 tablet, Rfl: 1  •  calcium carbonate (OS-VIDA) 600 MG tablet, Take 600 mg by mouth 2 (two) times a day with meals , Disp: , Rfl:   •  Cholecalciferol (VITAMIN D3 PO), Take 1 capsule by mouth daily 2000, Disp: , Rfl:   •  Cranberry-Vitamin C-Probiotic (AZO CRANBERRY PO), Take by mouth as needed, Disp: , Rfl:   •  Diclofenac Sodium (VOLTAREN) 1 %, Apply 2 g topically 4 (four) times a day, Disp: 1 Tube, Rfl: 2  •  LORazepam (ATIVAN) 0.5 mg tablet, Take 1 tablet (0.5 mg total) by mouth as needed (help sleep), Disp: 30 tablet, Rfl: 0  •  Multiple Vitamins-Minerals (PRESERVISION AREDS 2) CAPS, Take 1 capsule by mouth daily, Disp: 90 capsule, Rfl: 1  •  omeprazole (PriLOSEC) 40 MG capsule, Take 1 capsule (40 mg total) by mouth daily, Disp: 90 capsule, Rfl: 1    Current Facility-Administered Medications:   •  denosumab (PROLIA) subcutaneous injection 60 mg, 60 mg, Subcutaneous, Q6 Months, Chan Bradshaw MD    Allergies   Allergen Reactions   • Flexeril [Cyclobenzaprine] Syncope   • Daypro [Oxaprozin]      Unknown allergic reaction   • Minocycline Other (See Comments)     Loss of taste       Social History   Past Surgical History:   Procedure Laterality Date   • APPENDECTOMY     • CATARACT EXTRACTION, BILATERAL     • HYSTERECTOMY     • INCISIONAL BREAST BIOPSY       Family History   Problem Relation Age of Onset   • Diabetes Mother    • Varicose Veins Mother    • Diabetes Brother        Objective:  /64 (BP Location: Left arm, Patient Position: Sitting, Cuff Size: Adult)   Pulse 63   Temp 98 °F (36.7 °C) (Temporal)   Ht 5' 3" (1.6 m)   Wt 57.6 kg (127 lb)   SpO2 99% Comment: ra  BMI 22.50 kg/m²   Body mass index is 22.5 kg/m². Physical Exam  Constitutional:       Appearance: She is well-developed. She is not ill-appearing or diaphoretic. HENT:      Head: Normocephalic. Right Ear: External ear normal.      Left Ear: External ear normal.      Mouth/Throat:      Pharynx: No oropharyngeal exudate or posterior oropharyngeal erythema. Eyes:      General: No scleral icterus.      Pupils: Pupils are equal, round, and reactive to light. Neck:      Thyroid: No thyromegaly. Trachea: No tracheal deviation. Cardiovascular:      Rate and Rhythm: Normal rate and regular rhythm. Heart sounds: Normal heart sounds. No murmur heard. Comments: Bilateral varicose veins present  Pulmonary:      Effort: Pulmonary effort is normal. No respiratory distress. Breath sounds: Normal breath sounds. Chest:      Chest wall: No tenderness. Abdominal:      General: Bowel sounds are normal.      Palpations: Abdomen is soft. There is no mass. Tenderness: There is no abdominal tenderness. Musculoskeletal:         General: Normal range of motion. Cervical back: Normal range of motion and neck supple. Right lower leg: No edema. Left lower leg: No edema. Lymphadenopathy:      Cervical: No cervical adenopathy. Skin:     General: Skin is warm. Neurological:      Mental Status: She is alert and oriented to person, place, and time. Cranial Nerves: No cranial nerve deficit. Psychiatric:         Mood and Affect: Mood normal.         Behavior: Behavior normal.         Thought Content:  Thought content normal.

## 2023-09-18 NOTE — ASSESSMENT & PLAN NOTE
First Prolia injection given in left arm. Tolerated well. Continue with vitamin D3, calcium and weightbearing exercises.

## 2023-09-18 NOTE — ASSESSMENT & PLAN NOTE
Recent hemoglobin A1c 6.4. Advised for better diet control.   Presently patient is not watching her sugar and carbohydrate

## 2023-09-22 NOTE — TELEPHONE ENCOUNTER
Appointment for this patient will be 03/25/2023 at 1 p.m. with Dr. Yulia Schaffer in the [place] office.     Authorization is good for this injection    Marked on the calendars and also the list No new handouts [facility]

## 2023-10-09 DIAGNOSIS — G47.00 INSOMNIA, UNSPECIFIED TYPE: ICD-10-CM

## 2023-10-09 RX ORDER — LORAZEPAM 0.5 MG/1
0.5 TABLET ORAL AS NEEDED
Qty: 30 TABLET | Refills: 0 | Status: SHIPPED | OUTPATIENT
Start: 2023-10-09

## 2023-11-30 ENCOUNTER — TELEPHONE (OUTPATIENT)
Dept: INTERNAL MEDICINE CLINIC | Facility: OTHER | Age: 88
End: 2023-11-30

## 2023-11-30 DIAGNOSIS — E87.5 HYPERKALEMIA: Primary | ICD-10-CM

## 2023-11-30 NOTE — TELEPHONE ENCOUNTER
Patient called stating she has an appointment on Monday, but wants to know if she should be seen sooner because of the results and wants to know what she should do.

## 2023-11-30 NOTE — TELEPHONE ENCOUNTER
No need to be seen sooner. The blood sample was hemolyzed which is likely the reason for the elevated potassium level however I would prefer to have it rechecked for confirmation.

## 2023-11-30 NOTE — TELEPHONE ENCOUNTER
[facility] called to inform that recent lab work done this morning at 7:26 am the results are in and her potassium came back 6.3 and It is moderately hemolyzed

## 2023-12-04 ENCOUNTER — OFFICE VISIT (OUTPATIENT)
Dept: INTERNAL MEDICINE CLINIC | Facility: OTHER | Age: 88
End: 2023-12-04
Payer: COMMERCIAL

## 2023-12-04 VITALS
BODY MASS INDEX: 22.32 KG/M2 | WEIGHT: 126 LBS | HEIGHT: 63 IN | OXYGEN SATURATION: 98 % | HEART RATE: 68 BPM | TEMPERATURE: 98.5 F | SYSTOLIC BLOOD PRESSURE: 130 MMHG | DIASTOLIC BLOOD PRESSURE: 60 MMHG

## 2023-12-04 DIAGNOSIS — R73.03 PREDIABETES: ICD-10-CM

## 2023-12-04 DIAGNOSIS — M81.0 AGE-RELATED OSTEOPOROSIS WITHOUT CURRENT PATHOLOGICAL FRACTURE: ICD-10-CM

## 2023-12-04 DIAGNOSIS — G47.00 INSOMNIA, UNSPECIFIED TYPE: ICD-10-CM

## 2023-12-04 DIAGNOSIS — E87.5 HYPERKALEMIA: ICD-10-CM

## 2023-12-04 DIAGNOSIS — E78.2 MIXED HYPERLIPIDEMIA: ICD-10-CM

## 2023-12-04 DIAGNOSIS — K21.9 GASTROESOPHAGEAL REFLUX DISEASE WITHOUT ESOPHAGITIS: Primary | ICD-10-CM

## 2023-12-04 DIAGNOSIS — I83.93 ASYMPTOMATIC VARICOSE VEINS OF BOTH LOWER EXTREMITIES: ICD-10-CM

## 2023-12-04 DIAGNOSIS — N18.31 STAGE 3A CHRONIC KIDNEY DISEASE (HCC): ICD-10-CM

## 2023-12-04 DIAGNOSIS — E55.9 VITAMIN D DEFICIENCY: ICD-10-CM

## 2023-12-04 DIAGNOSIS — I10 BENIGN ESSENTIAL HYPERTENSION: ICD-10-CM

## 2023-12-04 PROCEDURE — 99214 OFFICE O/P EST MOD 30 MIN: CPT | Performed by: INTERNAL MEDICINE

## 2023-12-04 RX ORDER — OFLOXACIN 3 MG/ML
SOLUTION/ DROPS OPHTHALMIC
COMMUNITY
Start: 2023-11-07

## 2023-12-04 RX ORDER — LORAZEPAM 0.5 MG/1
0.5 TABLET ORAL AS NEEDED
Qty: 30 TABLET | Refills: 0 | Status: SHIPPED | OUTPATIENT
Start: 2023-12-04

## 2023-12-04 RX ORDER — ERYTHROMYCIN 5 MG/G
OINTMENT OPHTHALMIC
COMMUNITY
Start: 2023-11-07

## 2023-12-04 NOTE — ASSESSMENT & PLAN NOTE
Continue with the low sugar/low-carb diet. Last hemoglobin A1c in September 23 was 6.4.   We will check hemoglobin A1c in 3-month

## 2023-12-04 NOTE — PROGRESS NOTES
Assessment/Plan:    Gastroesophageal reflux disease without esophagitis  Continue with present dose of omeprazole 40 mg daily along with healthy diet    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  Asymptomatic. Advised to use compression socks on daily basis    Benign essential hypertension  Repeat blood pressure 130/60. Continue with present dose of amlodipine 5 mg daily. Advised to monitor blood pressure at home    Age-related osteoporosis without current pathological fracture  Continue with the Prolia injection along with calcium, vitamin D3 and weightbearing exercises    Prediabetes  Continue with the low sugar/low-carb diet. Last hemoglobin A1c in September 23 was 6.4. We will check hemoglobin A1c in 3-month    Hyperkalemia  Specimen was hemolyzed. Potassium was 6.2. Today repeat potassium is 4.2. Diagnoses and all orders for this visit:    Gastroesophageal reflux disease without esophagitis  -     CBC; Future    Insomnia, unspecified type  -     LORazepam (ATIVAN) 0.5 mg tablet; Take 1 tablet (0.5 mg total) by mouth as needed (help sleep)    Benign essential hypertension    Age-related osteoporosis without current pathological fracture    Stage 3a chronic kidney disease (HCC)  -     Comprehensive metabolic panel; Future    Vitamin D deficiency    Prediabetes  -     Hemoglobin A1C; Future    Mixed hyperlipidemia  -     Lipid Panel with Direct LDL reflex; Future    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    Hyperkalemia    Other orders  -     erythromycin (ILOTYCIN) ophthalmic ointment; APPLY A SMALL AMOUNT AT NIGHT ONLY TO THE AFFECTED EYELID(S) (Patient not taking: Reported on 12/4/2023)  -     ofloxacin (OCUFLOX) 0.3 % ophthalmic solution; INSTILL 1 DROP INTO AFFECTED EYE(S) 4 TIMES DAILY (Patient not taking: Reported on 12/4/2023)               Subjective:          Patient ID: Zeinab Chamberlain is a 81 y.o. female.     Patient is here for follow-up and also blood work done would like to discuss results. Also complaining of shoulder pain after excessive dancing yesterday. No chest pain. Shoulder Pain   Pertinent negatives include no fever. GI Problem  The primary symptoms include arthralgias. Primary symptoms do not include fever, fatigue, abdominal pain, nausea, vomiting, diarrhea, dysuria or rash. The following portions of the patient's history were reviewed and updated as appropriate: allergies, current medications, past family history, past medical history, past social history, past surgical history, and problem list.    Review of Systems   Constitutional:  Negative for fatigue and fever. HENT:  Negative for congestion, ear discharge, ear pain, postnasal drip, sinus pressure, sore throat, tinnitus and trouble swallowing. Eyes:  Negative for discharge, itching and visual disturbance. Respiratory:  Negative for cough and shortness of breath. Cardiovascular:  Negative for chest pain and palpitations. Gastrointestinal:  Negative for abdominal pain, diarrhea, nausea and vomiting. Endocrine: Negative for cold intolerance and polyuria. Genitourinary:  Negative for difficulty urinating, dysuria and urgency. Musculoskeletal:  Positive for arthralgias. Negative for neck pain. Skin:  Negative for rash. Allergic/Immunologic: Negative for environmental allergies. Neurological:  Negative for dizziness, weakness and headaches. Psychiatric/Behavioral:  Negative for agitation and behavioral problems. The patient is not nervous/anxious.           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 ([address])     Last Assessed:3/18/2014         Current Outpatient Medications:     acetaminophen (TYLENOL) 500 mg tablet, Take 1 tablet (500 mg total) by mouth every 4 (four) hours as needed (knee and back pain), Disp: 90 tablet, Rfl: 1    amLODIPine (NORVASC) 5 mg tablet, Take 1 tablet (5 mg total) by mouth daily, Disp: 90 tablet, Rfl: 1    atorvastatin (LIPITOR) 20 mg tablet, Take 1 tablet (20 mg total) by mouth daily, Disp: 90 tablet, Rfl: 1    calcium carbonate (OS-VIDA) 600 MG tablet, Take 600 mg by mouth 2 (two) times a day with meals , Disp: , Rfl:     Cholecalciferol (VITAMIN D3 PO), Take 1 capsule by mouth daily 2000, Disp: , Rfl:     Cranberry-Vitamin C-Probiotic (AZO CRANBERRY PO), Take by mouth as needed, Disp: , Rfl:     Diclofenac Sodium (VOLTAREN) 1 %, Apply 2 g topically 4 (four) times a day, Disp: 1 Tube, Rfl: 2    LORazepam (ATIVAN) 0.5 mg tablet, Take 1 tablet (0.5 mg total) by mouth as needed (help sleep), Disp: 30 tablet, Rfl: 0    Multiple Vitamins-Minerals (PRESERVISION AREDS 2) CAPS, Take 1 capsule by mouth daily, Disp: 90 capsule, Rfl: 1    omeprazole (PriLOSEC) 40 MG capsule, Take 1 capsule (40 mg total) by mouth daily, Disp: 90 capsule, Rfl: 1    erythromycin (ILOTYCIN) ophthalmic ointment, APPLY A SMALL AMOUNT AT NIGHT ONLY TO THE AFFECTED EYELID(S) (Patient not taking: Reported on 12/4/2023), Disp: , Rfl:     ofloxacin (OCUFLOX) 0.3 % ophthalmic solution, INSTILL 1 DROP INTO AFFECTED EYE(S) 4 TIMES DAILY (Patient not taking: Reported on 12/4/2023), Disp: , Rfl:     Current Facility-Administered Medications:     denosumab (PROLIA) subcutaneous injection 60 mg, 60 mg, Subcutaneous, Q6 Months, Deya Grimes MD, 60 mg at 09/18/23 1629    Allergies   Allergen Reactions    Flexeril [Cyclobenzaprine] Syncope    Daypro [Oxaprozin]      Unknown allergic reaction    Minocycline Other (See Comments)     Loss of taste       Social History   Past Surgical History:   Procedure Laterality Date    APPENDECTOMY      CATARACT EXTRACTION, BILATERAL      HYSTERECTOMY      INCISIONAL BREAST BIOPSY       Family History   Problem Relation Age of Onset    Diabetes Mother     Varicose Veins Mother     Diabetes Brother        Objective:  /60   Pulse 68   Temp 98.5 °F (36.9 °C) (Temporal)   Ht 5' 3" (1.6 m)   Wt 57.2 kg (126 lb)   SpO2 98% Comment: ra  BMI 22.32 kg/m²   Body mass index is 22.32 kg/m². Physical Exam  Constitutional:       General: She is not in acute distress. Appearance: She is well-developed. She is not diaphoretic. HENT:      Head: Normocephalic. Right Ear: External ear normal.      Left Ear: External ear normal.      Nose: No rhinorrhea. Eyes:      General: No scleral icterus. Pupils: Pupils are equal, round, and reactive to light. Neck:      Thyroid: No thyromegaly. Trachea: No tracheal deviation. Cardiovascular:      Rate and Rhythm: Normal rate and regular rhythm. Heart sounds: Normal heart sounds. Comments: Bilateral varicose veins present  Pulmonary:      Effort: Pulmonary effort is normal. No respiratory distress. Breath sounds: Normal breath sounds. Chest:      Chest wall: No tenderness. Abdominal:      General: Bowel sounds are normal.      Palpations: Abdomen is soft. There is no mass. Tenderness: There is no abdominal tenderness. Musculoskeletal:         General: Normal range of motion. Cervical back: Normal range of motion and neck supple. No rigidity. Right lower leg: No edema. Left lower leg: No edema. Lymphadenopathy:      Cervical: No cervical adenopathy. Skin:     General: Skin is warm. Findings: No erythema or rash. Neurological:      Mental Status: She is alert and oriented to person, place, and time. Cranial Nerves: No cranial nerve deficit.    Psychiatric:         Mood and Affect: Mood normal.         Behavior: Behavior normal.

## 2023-12-04 NOTE — ASSESSMENT & PLAN NOTE
Repeat blood pressure 130/60. Continue with present dose of amlodipine 5 mg daily.   Advised to monitor blood pressure at home

## 2023-12-28 ENCOUNTER — OFFICE VISIT (OUTPATIENT)
Dept: INTERNAL MEDICINE CLINIC | Facility: CLINIC | Age: 88
End: 2023-12-28
Payer: COMMERCIAL

## 2023-12-28 ENCOUNTER — TELEPHONE (OUTPATIENT)
Dept: INTERNAL MEDICINE CLINIC | Facility: OTHER | Age: 88
End: 2023-12-28

## 2023-12-28 ENCOUNTER — HOSPITAL ENCOUNTER (OUTPATIENT)
Dept: RADIOLOGY | Facility: IMAGING CENTER | Age: 88
End: 2023-12-28
Payer: COMMERCIAL

## 2023-12-28 VITALS
WEIGHT: 122 LBS | SYSTOLIC BLOOD PRESSURE: 143 MMHG | DIASTOLIC BLOOD PRESSURE: 78 MMHG | OXYGEN SATURATION: 99 % | TEMPERATURE: 98.8 F | HEIGHT: 63 IN | BODY MASS INDEX: 21.62 KG/M2 | HEART RATE: 65 BPM

## 2023-12-28 DIAGNOSIS — M54.50 ACUTE MIDLINE LOW BACK PAIN WITHOUT SCIATICA: ICD-10-CM

## 2023-12-28 DIAGNOSIS — M81.0 AGE-RELATED OSTEOPOROSIS WITHOUT CURRENT PATHOLOGICAL FRACTURE: Primary | ICD-10-CM

## 2023-12-28 DIAGNOSIS — M81.0 AGE-RELATED OSTEOPOROSIS WITHOUT CURRENT PATHOLOGICAL FRACTURE: ICD-10-CM

## 2023-12-28 DIAGNOSIS — R11.0 NAUSEA: ICD-10-CM

## 2023-12-28 PROCEDURE — 72080 X-RAY EXAM THORACOLMB 2/> VW: CPT

## 2023-12-28 PROCEDURE — 99213 OFFICE O/P EST LOW 20 MIN: CPT | Performed by: INTERNAL MEDICINE

## 2023-12-28 RX ORDER — ONDANSETRON 4 MG/1
4 TABLET, FILM COATED ORAL EVERY 8 HOURS PRN
Qty: 20 TABLET | Refills: 0 | Status: SHIPPED | OUTPATIENT
Start: 2023-12-28

## 2023-12-28 NOTE — TELEPHONE ENCOUNTER
Patient stopped by the office today to inform you that she was seen today by Dr. Wesley and she had ordered an XR of the spine for [place], and when the results come back she asked if you could take a look at them

## 2023-12-28 NOTE — PROGRESS NOTES
Assessment/Plan:    Diagnoses and all orders for this visit:    Age-related osteoporosis without current pathological fracture  -     XR spine thoracolumbar 2 vw; Future    Acute midline low back pain without sciatica  -     XR spine thoracolumbar 2 vw; Future    Nausea  -     ondansetron (ZOFRAN) 4 mg tablet; Take 1 tablet (4 mg total) by mouth every 8 (eight) hours as needed for nausea or vomiting       Patient reports acute onset of backache 2 days ago while doing home chills.  Pain now improved  No history of injury or weight lifting  Given osteoporosis history will check for x-ray of the lumbar spine  Discussed supportive management-Tylenol/warm compress/lidocaine patch for symptom improvement           There are no Patient Instructions on file for this visit.    Subjective:      Patient ID: Fallon Proctor is a 88 y.o. female    HPI      Current Outpatient Medications:     acetaminophen (TYLENOL) 500 mg tablet, Take 1 tablet (500 mg total) by mouth every 4 (four) hours as needed (knee and back pain), Disp: 90 tablet, Rfl: 1    amLODIPine (NORVASC) 5 mg tablet, Take 1 tablet (5 mg total) by mouth daily, Disp: 90 tablet, Rfl: 1    atorvastatin (LIPITOR) 20 mg tablet, Take 1 tablet (20 mg total) by mouth daily, Disp: 90 tablet, Rfl: 1    Cholecalciferol (VITAMIN D3 PO), Take 1 capsule by mouth daily 2000, Disp: , Rfl:     Cranberry-Vitamin C-Probiotic (AZO CRANBERRY PO), Take by mouth as needed, Disp: , Rfl:     Diclofenac Sodium (VOLTAREN) 1 %, Apply 2 g topically 4 (four) times a day, Disp: 1 Tube, Rfl: 2    LORazepam (ATIVAN) 0.5 mg tablet, Take 1 tablet (0.5 mg total) by mouth as needed (help sleep), Disp: 30 tablet, Rfl: 0    Multiple Vitamins-Minerals (PRESERVISION AREDS 2) CAPS, Take 1 capsule by mouth daily, Disp: 90 capsule, Rfl: 1    omeprazole (PriLOSEC) 40 MG capsule, Take 1 capsule (40 mg total) by mouth daily, Disp: 90 capsule, Rfl: 1    ondansetron (ZOFRAN) 4 mg tablet, Take 1 tablet (4 mg total) by  mouth every 8 (eight) hours as needed for nausea or vomiting, Disp: 20 tablet, Rfl: 0    calcium carbonate (OS-VIDA) 600 MG tablet, Take 600 mg by mouth 2 (two) times a day with meals  (Patient not taking: Reported on 12/28/2023), Disp: , Rfl:     erythromycin (ILOTYCIN) ophthalmic ointment, APPLY A SMALL AMOUNT AT NIGHT ONLY TO THE AFFECTED EYELID(S) (Patient not taking: Reported on 12/4/2023), Disp: , Rfl:     ofloxacin (OCUFLOX) 0.3 % ophthalmic solution, INSTILL 1 DROP INTO AFFECTED EYE(S) 4 TIMES DAILY (Patient not taking: Reported on 12/4/2023), Disp: , Rfl:     Current Facility-Administered Medications:     denosumab (PROLIA) subcutaneous injection 60 mg, 60 mg, Subcutaneous, Q6 Months, Wali Byrd MD, 60 mg at 09/18/23 5342     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    The following portions of the patient's history were reviewed and updated as appropriate: allergies, current medications, past family history, past medical history, past social history, past surgical history and problem list.     Review of Systems   Constitutional:  Negative for appetite  change, chills, diaphoresis, fatigue, fever and unexpected weight change.   Respiratory:  Negative for apnea, cough, choking, chest tightness, shortness of breath, wheezing and stridor.    Cardiovascular:  Negative for chest pain, palpitations and leg swelling.   Gastrointestinal:  Negative for abdominal distention, abdominal pain, anal bleeding, blood in stool, constipation, diarrhea, nausea and vomiting.   Genitourinary:  Negative for decreased urine volume, difficulty urinating, frequency and urgency.   Musculoskeletal:  Positive for back pain. Negative for arthralgias and myalgias.   Neurological:  Negative for dizziness, light-headedness, numbness and headaches.         Objective:      Vitals:    12/28/23 1013   BP: 143/78   Pulse: 65   Temp: 98.8 °F (37.1 °C)   SpO2: 99%          Physical Exam  Vitals reviewed.   Constitutional:       General: She is not in acute distress.     Appearance: Normal appearance. She is not ill-appearing, toxic-appearing or diaphoretic.   HENT:      Mouth/Throat:      Mouth: Mucous membranes are moist.   Cardiovascular:      Rate and Rhythm: Normal rate and regular rhythm.      Pulses: Normal pulses.      Heart sounds: Normal heart sounds. No murmur heard.     No friction rub. No gallop.   Pulmonary:      Effort: Pulmonary effort is normal. No respiratory distress.      Breath sounds: Normal breath sounds. No stridor. No wheezing, rhonchi or rales.   Chest:      Chest wall: No tenderness.   Musculoskeletal:      Thoracic back: Normal.      Lumbar back: Normal. Negative right straight leg raise test and negative left straight leg raise test.      Right lower leg: No edema.      Left lower leg: No edema.   Skin:     General: Skin is warm and dry.      Findings: No lesion or rash.   Neurological:      Mental Status: She is alert.

## 2024-01-12 DIAGNOSIS — E78.2 MIXED HYPERLIPIDEMIA: ICD-10-CM

## 2024-01-12 DIAGNOSIS — G47.00 INSOMNIA, UNSPECIFIED TYPE: ICD-10-CM

## 2024-01-12 DIAGNOSIS — I10 ESSENTIAL HYPERTENSION: ICD-10-CM

## 2024-01-15 RX ORDER — ATORVASTATIN CALCIUM 20 MG/1
20 TABLET, FILM COATED ORAL DAILY
Qty: 90 TABLET | Refills: 1 | Status: SHIPPED | OUTPATIENT
Start: 2024-01-15

## 2024-01-15 RX ORDER — LORAZEPAM 0.5 MG/1
0.5 TABLET ORAL AS NEEDED
Qty: 30 TABLET | Refills: 0 | Status: SHIPPED | OUTPATIENT
Start: 2024-01-15

## 2024-01-15 RX ORDER — AMLODIPINE BESYLATE 5 MG/1
5 TABLET ORAL DAILY
Qty: 90 TABLET | Refills: 1 | Status: SHIPPED | OUTPATIENT
Start: 2024-01-15

## 2024-01-15 NOTE — TELEPHONE ENCOUNTER
Can you please at least send the non controlled medications to the pharmacy before Dr Byrd would sign off on the controlled?

## 2024-02-19 DIAGNOSIS — E78.2 MIXED HYPERLIPIDEMIA: ICD-10-CM

## 2024-02-19 RX ORDER — ATORVASTATIN CALCIUM 20 MG/1
20 TABLET, FILM COATED ORAL DAILY
Qty: 90 TABLET | Refills: 1 | OUTPATIENT
Start: 2024-02-19

## 2024-02-19 NOTE — TELEPHONE ENCOUNTER
*PATIENT STATES NO REFILLS*    [id number] - [place] - [id number]     Nov: 3/25/24

## 2024-03-22 LAB
ALBUMIN SERPL-MCNC: 4 G/DL (ref 3.5–5.7)
ALP SERPL-CCNC: 44 U/L (ref 35–120)
ALT SERPL-CCNC: 10 U/L
ANION GAP SERPL CALCULATED.3IONS-SCNC: 6 MMOL/L (ref 3–11)
AST SERPL-CCNC: 16 U/L
BILIRUB SERPL-MCNC: 0.4 MG/DL (ref 0.2–1)
BUN SERPL-MCNC: 13 MG/DL (ref 7–25)
CALCIUM SERPL-MCNC: 9.7 MG/DL (ref 8.5–10.1)
CHLORIDE SERPL-SCNC: 104 MMOL/L (ref 100–109)
CHOLEST SERPL-MCNC: 160 MG/DL
CHOLEST/HDLC SERPL: 3 {RATIO}
CO2 SERPL-SCNC: 29 MMOL/L (ref 21–31)
CREAT SERPL-MCNC: 0.76 MG/DL (ref 0.4–1.1)
CYTOLOGY CMNT CVX/VAG CYTO-IMP: ABNORMAL
ERYTHROCYTE [DISTWIDTH] IN BLOOD BY AUTOMATED COUNT: 13.7 % (ref 12–16)
EST. AVERAGE GLUCOSE BLD GHB EST-MCNC: 123 MG/DL
GFR/BSA.PRED SERPLBLD CYS-BASED-ARV: 75 ML/MIN/{1.73_M2}
GLUCOSE SERPL-MCNC: 102 MG/DL (ref 65–99)
HBA1C MFR BLD: 5.9 %
HCT VFR BLD AUTO: 41.3 % (ref 35–43)
HDLC SERPL-MCNC: 53 MG/DL (ref 23–92)
HGB BLD-MCNC: 13.4 G/DL (ref 11.5–14.5)
LDLC SERPL CALC-MCNC: 89 MG/DL
MCH RBC QN AUTO: 28.1 PG (ref 26–34)
MCHC RBC AUTO-ENTMCNC: 32.5 G/DL (ref 32–37)
MCV RBC AUTO: 86 FL (ref 80–100)
NONHDLC SERPL-MCNC: 107 MG/DL
PLATELET # BLD AUTO: 257 THOU/CMM (ref 140–350)
PMV BLD REES-ECKER: 8.4 FL (ref 7.5–11.3)
POTASSIUM SERPL-SCNC: 4.6 MMOL/L (ref 3.5–5.2)
PROT SERPL-MCNC: 6.6 G/DL (ref 6.3–8.3)
RBC # BLD AUTO: 4.79 MILL/CMM (ref 3.7–4.7)
SODIUM SERPL-SCNC: 139 MMOL/L (ref 135–145)
TRIGL SERPL-MCNC: 88 MG/DL
WBC # BLD AUTO: 8.1 THOU/CMM (ref 4–10)

## 2024-03-25 ENCOUNTER — TELEPHONE (OUTPATIENT)
Dept: INTERNAL MEDICINE CLINIC | Facility: OTHER | Age: 89
End: 2024-03-25

## 2024-03-25 ENCOUNTER — OFFICE VISIT (OUTPATIENT)
Dept: INTERNAL MEDICINE CLINIC | Facility: OTHER | Age: 89
End: 2024-03-25
Payer: COMMERCIAL

## 2024-03-25 VITALS
OXYGEN SATURATION: 98 % | TEMPERATURE: 98.7 F | BODY MASS INDEX: 22.29 KG/M2 | SYSTOLIC BLOOD PRESSURE: 130 MMHG | HEART RATE: 73 BPM | WEIGHT: 125.8 LBS | RESPIRATION RATE: 18 BRPM | DIASTOLIC BLOOD PRESSURE: 66 MMHG | HEIGHT: 63 IN

## 2024-03-25 DIAGNOSIS — M81.0 AGE-RELATED OSTEOPOROSIS WITHOUT CURRENT PATHOLOGICAL FRACTURE: Primary | ICD-10-CM

## 2024-03-25 DIAGNOSIS — E55.9 VITAMIN D DEFICIENCY: ICD-10-CM

## 2024-03-25 DIAGNOSIS — K21.9 GASTROESOPHAGEAL REFLUX DISEASE WITHOUT ESOPHAGITIS: ICD-10-CM

## 2024-03-25 DIAGNOSIS — I10 BENIGN ESSENTIAL HYPERTENSION: ICD-10-CM

## 2024-03-25 DIAGNOSIS — N18.31 STAGE 3A CHRONIC KIDNEY DISEASE (HCC): ICD-10-CM

## 2024-03-25 DIAGNOSIS — G47.00 INSOMNIA, UNSPECIFIED TYPE: ICD-10-CM

## 2024-03-25 DIAGNOSIS — R73.03 PREDIABETES: ICD-10-CM

## 2024-03-25 DIAGNOSIS — E78.2 MIXED HYPERLIPIDEMIA: ICD-10-CM

## 2024-03-25 DIAGNOSIS — I83.93 ASYMPTOMATIC VARICOSE VEINS OF BOTH LOWER EXTREMITIES: ICD-10-CM

## 2024-03-25 PROCEDURE — 99214 OFFICE O/P EST MOD 30 MIN: CPT | Performed by: INTERNAL MEDICINE

## 2024-03-25 PROCEDURE — 96372 THER/PROPH/DIAG INJ SC/IM: CPT | Performed by: INTERNAL MEDICINE

## 2024-03-25 NOTE — ASSESSMENT & PLAN NOTE
Injection Prolia given left arm.  Tolerated well.  Continue with calcium, vitamin D3 supplementation and weightbearing exercises.  She does go for dancing couple of times a week

## 2024-03-25 NOTE — PROGRESS NOTES
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  Continue with compression socks    Benign essential hypertension  Doing well on present regimen.  Continue with low-salt diet    Gastroesophageal reflux disease without esophagitis  Doing well on omeprazole 40 mg daily.  Continue with healthy diet    Age-related osteoporosis without current pathological fracture  Injection Prolia given left arm.  Tolerated well.  Continue with calcium, vitamin D3 supplementation and weightbearing exercises.  She does go for dancing couple of times a week    CKD (chronic kidney disease) stage 3, GFR 30-59 ml/min ([facility])  Lab Results   Component Value Date    EGFR 75 03/22/2024    EGFR 81 12/04/2023    EGFR 84 11/30/2023    CREATININE 0.76 03/22/2024    CREATININE 0.72 12/04/2023    CREATININE 0.68 11/30/2023   Renal function is stable.  Will continue to monitor.  Continue with adequate oral hydration and to avoid nephrotoxic meds    Insomnia  Doing well on as needed use of lorazepam    Mixed hyperlipidemia  Lipid profile is in acceptable range.  Continue with atorvastatin 20 mg daily along with low-fat diet    Prediabetes  Hemoglobin A1c is 5.9.  Which is better than before.  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 female.    Patient is here for follow-up and for Prolia injection.  Also had blood work done would like to  discuss results.  Otherwise no new complaints or concerns.  Occasionally she feels more anxious under certain situations but most of the time she is fine.    Back Pain  Pertinent negatives include no abdominal pain, chest pain, dysuria, fever, headaches or weakness.   Hand Pain   Pertinent negatives include no chest pain.       The following portions of the patient's history were reviewed and updated as appropriate: allergies, current medications, past family history, past medical history, past social history, past surgical history, and problem list.    Review of Systems   Constitutional:  Negative for fatigue and fever.   HENT:  Negative for congestion, ear discharge, ear pain, postnasal drip, sinus pressure, sore throat, tinnitus and trouble swallowing.    Eyes:  Negative for discharge, itching and visual disturbance.   Respiratory:  Negative for cough and shortness of breath.    Cardiovascular:  Negative for chest pain and palpitations.   Gastrointestinal:  Negative for abdominal pain, diarrhea, nausea and vomiting.   Endocrine: Negative for cold intolerance and polyuria.   Genitourinary:  Negative for difficulty urinating, dysuria and urgency.   Musculoskeletal:  Positive for back pain. Negative for arthralgias and neck pain.   Skin:  Negative for rash.   Allergic/Immunologic: Negative for environmental allergies.   Neurological:  Negative for dizziness, weakness and headaches.   Psychiatric/Behavioral:  Negative for agitation and behavioral problems. The patient is not nervous/anxious.          Past Medical History:   Diagnosis Date    Anxiety     Last Assessed:11/24/2014    Appendicitis     Asymptomatic varicose veins     Last Assessed:10/8/2014    Esophageal reflux     Last Assessed:11/3/2014    Fracture of rib     Last Assessed:8/7/2015    Hypertension     Insomnia     Last Assessed:3/18/2014    Macular degeneration     Syncope     pt reports recent syncope    Varicose veins of lower extremities with ulcer  "and inflammation (HCC)     Last Assessed:3/18/2014         Current Outpatient Medications:     acetaminophen (TYLENOL) 500 mg tablet, Take 1 tablet (500 mg total) by mouth every 4 (four) hours as needed (knee and back pain), Disp: 90 tablet, Rfl: 1    amLODIPine (NORVASC) 5 mg tablet, Take 1 tablet (5 mg total) by mouth daily, Disp: 90 tablet, Rfl: 1    atorvastatin (LIPITOR) 20 mg tablet, Take 1 tablet (20 mg total) by mouth daily, Disp: 90 tablet, Rfl: 1    Cholecalciferol (VITAMIN D3 PO), Take 1 capsule by mouth daily 2000, Disp: , Rfl:     Cranberry-Vitamin C-Probiotic (AZO CRANBERRY PO), Take by mouth as needed, Disp: , Rfl:     LORazepam (ATIVAN) 0.5 mg tablet, Take 1 tablet (0.5 mg total) by mouth as needed (help sleep), Disp: 30 tablet, Rfl: 0    omeprazole (PriLOSEC) 40 MG capsule, Take 1 capsule (40 mg total) by mouth daily, Disp: 90 capsule, Rfl: 1    Current Facility-Administered Medications:     denosumab (PROLIA) subcutaneous injection 60 mg, 60 mg, Subcutaneous, Q6 Months, Wali Byrd MD, 60 mg at 09/18/23 1629    denosumab (PROLIA) subcutaneous injection 60 mg, 60 mg, Subcutaneous, Once,     Allergies   Allergen Reactions    Flexeril [Cyclobenzaprine] Syncope    Daypro [Oxaprozin]      Unknown allergic reaction    Minocycline Other (See Comments)     Loss of taste       Social History   Past Surgical History:   Procedure Laterality Date    APPENDECTOMY      CATARACT EXTRACTION, BILATERAL      HYSTERECTOMY      INCISIONAL BREAST BIOPSY       Family History   Problem Relation Age of Onset    Diabetes Mother     Varicose Veins Mother     Diabetes Brother        Objective:  /66 (BP Location: Left arm, Patient Position: Sitting, Cuff Size: Standard)   Pulse 73   Temp 98.7 °F (37.1 °C) (Temporal)   Resp 18   Ht 5' 3\" (1.6 m)   Wt 57.1 kg (125 lb 12.8 oz)   SpO2 98%   BMI 22.28 kg/m²   Body mass index is 22.28 kg/m².     Physical Exam  Constitutional:       General: She is not in acute " distress.     Appearance: She is well-developed.   HENT:      Head: Normocephalic.      Right Ear: External ear normal. There is no impacted cerumen.      Left Ear: External ear normal. There is no impacted cerumen.      Nose: No congestion or rhinorrhea.      Mouth/Throat:      Pharynx: No oropharyngeal exudate or posterior oropharyngeal erythema.   Eyes:      General: No scleral icterus.     Pupils: Pupils are equal, round, and reactive to light.   Neck:      Thyroid: No thyromegaly.      Trachea: No tracheal deviation.   Cardiovascular:      Rate and Rhythm: Normal rate and regular rhythm.      Heart sounds: Normal heart sounds. No murmur heard.  Pulmonary:      Effort: Pulmonary effort is normal. No respiratory distress.      Breath sounds: Normal breath sounds.   Chest:      Chest wall: No tenderness.   Abdominal:      General: Bowel sounds are normal.      Palpations: Abdomen is soft. There is no mass.      Tenderness: There is no abdominal tenderness.   Musculoskeletal:         General: Normal range of motion.      Cervical back: Normal range of motion and neck supple. No rigidity.      Right lower leg: No edema.      Left lower leg: No edema.   Lymphadenopathy:      Cervical: No cervical adenopathy.   Skin:     General: Skin is warm.      Findings: No erythema or rash.   Neurological:      Mental Status: She is alert and oriented to person, place, and time.      Cranial Nerves: No cranial nerve deficit.   Psychiatric:         Mood and Affect: Mood normal.         Behavior: Behavior normal.

## 2024-03-25 NOTE — TELEPHONE ENCOUNTER
Patient only likes coming to [place] and she prefers Mondays.  She is scheduled for 10/21/24 for her next prolia.

## 2024-03-25 NOTE — ASSESSMENT & PLAN NOTE
Lipid profile is in acceptable range.  Continue with atorvastatin 20 mg daily along with low-fat diet

## 2024-03-25 NOTE — ASSESSMENT & PLAN NOTE
Lab Results   Component Value Date    EGFR 75 03/22/2024    EGFR 81 12/04/2023    EGFR 84 11/30/2023    CREATININE 0.76 03/22/2024    CREATININE 0.72 12/04/2023    CREATININE 0.68 11/30/2023   Renal function is stable.  Will continue to monitor.  Continue with adequate oral hydration and to avoid nephrotoxic meds

## 2024-03-26 NOTE — TELEPHONE ENCOUNTER
Marked it on both calendars and updated the list    NEW AUTHORIZATION WILL BE NEEDED ON THE NEXT VISIT

## 2024-05-04 ENCOUNTER — HOSPITAL ENCOUNTER (EMERGENCY)
Facility: HOSPITAL | Age: 89
Discharge: HOME | End: 2024-05-04
Attending: EMERGENCY MEDICINE
Payer: COMMERCIAL

## 2024-05-04 ENCOUNTER — NURSE TRIAGE (OUTPATIENT)
Dept: OTHER | Facility: OTHER | Age: 89
End: 2024-05-04

## 2024-05-04 ENCOUNTER — APPOINTMENT (EMERGENCY)
Dept: CT IMAGING | Facility: HOSPITAL | Age: 89
End: 2024-05-04
Payer: COMMERCIAL

## 2024-05-04 ENCOUNTER — APPOINTMENT (EMERGENCY)
Dept: RADIOLOGY | Facility: HOSPITAL | Age: 89
End: 2024-05-04
Payer: COMMERCIAL

## 2024-05-04 VITALS
SYSTOLIC BLOOD PRESSURE: 166 MMHG | OXYGEN SATURATION: 99 % | HEART RATE: 68 BPM | TEMPERATURE: 97.7 F | DIASTOLIC BLOOD PRESSURE: 76 MMHG | RESPIRATION RATE: 16 BRPM

## 2024-05-04 DIAGNOSIS — E04.1 THYROID NODULE: ICD-10-CM

## 2024-05-04 DIAGNOSIS — R91.1 LUNG NODULE: ICD-10-CM

## 2024-05-04 DIAGNOSIS — M50.30 DEGENERATIVE DISC DISEASE, CERVICAL: ICD-10-CM

## 2024-05-04 DIAGNOSIS — S22.49XA: ICD-10-CM

## 2024-05-04 DIAGNOSIS — K44.9 HIATAL HERNIA: ICD-10-CM

## 2024-05-04 DIAGNOSIS — M25.511 RIGHT SHOULDER PAIN: ICD-10-CM

## 2024-05-04 DIAGNOSIS — I10 BENIGN ESSENTIAL HYPERTENSION: Primary | ICD-10-CM

## 2024-05-04 DIAGNOSIS — M79.601 RIGHT ARM PAIN: ICD-10-CM
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
## 2024-05-04 PROCEDURE — 73060 X-RAY EXAM OF HUMERUS: CPT

## 2024-05-04 PROCEDURE — 73590 X-RAY EXAM OF LOWER LEG: CPT

## 2024-05-04 PROCEDURE — 70450 CT HEAD/BRAIN W/O DYE: CPT

## 2024-05-04 PROCEDURE — 71250 CT THORAX DX C-: CPT

## 2024-05-04 PROCEDURE — 99284 EMERGENCY DEPT VISIT MOD MDM: CPT

## 2024-05-04 PROCEDURE — 73030 X-RAY EXAM OF SHOULDER: CPT

## 2024-05-04 PROCEDURE — 72125 CT NECK SPINE W/O DYE: CPT

## 2024-05-04 PROCEDURE — 73552 X-RAY EXAM OF FEMUR 2/>: CPT

## 2024-05-04 PROCEDURE — 99285 EMERGENCY DEPT VISIT HI MDM: CPT

## 2024-05-04 RX ORDER — ACETAMINOPHEN 500 MG
500 TABLET ORAL EVERY 6 HOURS PRN
Qty: 30 TABLET | Refills: 0 | Status: SHIPPED | OUTPATIENT
Start: 2024-05-04

## 2024-05-04 RX ORDER — ACETAMINOPHEN 325 MG/1
975 TABLET ORAL ONCE
Status: COMPLETED | OUTPATIENT
Start: 2024-05-04 | End: 2024-05-04

## 2024-05-04 RX ORDER — LIDOCAINE 50 MG/G
1 PATCH TOPICAL EVERY 24 HOURS
Qty: 15 PATCH | Refills: 0 | Status: SHIPPED | OUTPATIENT
Start: 2024-05-04

## 2024-05-04 RX ORDER — AMLODIPINE BESYLATE 5 MG/1
5 TABLET ORAL DAILY
Qty: 7 TABLET | Refills: 0 | Status: SHIPPED | OUTPATIENT
Start: 2024-05-04 | End: 2024-05-06

## 2024-05-04 RX ORDER — LIDOCAINE 50 MG/G
2 PATCH TOPICAL ONCE
Status: DISCONTINUED | OUTPATIENT
Start: 2024-05-04 | End: 2024-05-05 | Stop reason: HOSPADM

## 2024-05-04 RX ADMIN — ACETAMINOPHEN 975 MG: 325 TABLET, FILM COATED ORAL at 20:10

## 2024-05-04 RX ADMIN — LIDOCAINE 2 PATCH: 50 PATCH TOPICAL at 20:17

## 2024-05-04 NOTE — ED PROVIDER NOTES
History  Chief Complaint   Patient presents with    Fall     Pt with fall after heel hit chair.  Pt with R side body pain     The patient is an 88-year-old female, not on anticoagulation, who presents to the ED for evaluation following a fall.  She reports that she was walking by a chair when her heel got caught in the leg of it, causing her to fall onto her right side.  She reports she did strike her head, but did not have loss of consciousness.  She currently reports right arm, right thigh, right rib, and right sided head pain.  She denies experiencing nausea, vomiting, vision changes following fall.  She otherwise denies neck pain, chest pain, abdominal pain, back pain, numbness, paresthesia, focal weakness, vision changes.        Prior to Admission Medications   Prescriptions Last Dose Informant Patient Reported? Taking?   Cholecalciferol (VITAMIN D3 PO)  Self Yes No   Sig: Take 1 capsule by mouth daily 2000   Cranberry-Vitamin C-Probiotic (AZO CRANBERRY PO)  Self Yes No   Sig: Take by mouth as needed   LORazepam (ATIVAN) 0.5 mg tablet  Self No No   Sig: Take 1 tablet (0.5 mg total) by mouth as needed (help sleep)   acetaminophen (TYLENOL) 500 mg tablet  Self No No   Sig: Take 1 tablet (500 mg total) by mouth every 4 (four) hours as needed (knee and back pain)   amLODIPine (NORVASC) 5 mg tablet  Self No No   Sig: Take 1 tablet (5 mg total) by mouth daily   amLODIPine (NORVASC) 5 mg tablet   No No   Sig: Take 1 tablet (5 mg total) by mouth daily   atorvastatin (LIPITOR) 20 mg tablet  Self No No   Sig: Take 1 tablet (20 mg total) by mouth daily   omeprazole (PriLOSEC) 40 MG capsule  Self No No   Sig: Take 1 capsule (40 mg total) by mouth daily      Facility-Administered Medications Last Administration Doses Remaining   denosumab (PROLIA) subcutaneous injection 60 mg 9/18/2023  4:29 PM           Past Medical History:   Diagnosis Date    Anxiety     Last Assessed:11/24/2014    Appendicitis     Asymptomatic varicose  veins     Last Assessed:10/8/2014    Esophageal reflux     Last Assessed:11/3/2014    Fracture of rib     Last Assessed:8/7/2015    Hypertension     Insomnia     Last Assessed:3/18/2014    Macular degeneration     Syncope     pt reports recent syncope    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)     Last Assessed:3/18/2014       Past Surgical History:   Procedure Laterality Date    APPENDECTOMY      CATARACT EXTRACTION, BILATERAL      HYSTERECTOMY      INCISIONAL BREAST BIOPSY         Family History   Problem Relation Age of Onset    Diabetes Mother     Varicose Veins Mother     Diabetes Brother      I have reviewed and agree with the history as documented.    E-Cigarette/Vaping    E-Cigarette Use Never User      E-Cigarette/Vaping Substances    Nicotine No     THC No     CBD No     Flavoring No     Other No     Unknown No      Social History     Tobacco Use    Smoking status: Never    Smokeless tobacco: Never   Vaping Use    Vaping status: Never Used   Substance Use Topics    Alcohol use: Yes     Comment: socially    Drug use: No       Review of Systems   Constitutional:  Negative for chills and fever.   Eyes:  Negative for visual disturbance.   Respiratory:  Negative for cough and shortness of breath.    Cardiovascular:  Positive for chest pain (R ribs). Negative for leg swelling.   Gastrointestinal:  Negative for abdominal pain, constipation, diarrhea, nausea and vomiting.   Genitourinary:  Negative for dysuria and flank pain.   Musculoskeletal:  Positive for arthralgias. Negative for back pain, myalgias and neck pain.   Skin:  Negative for rash and wound.   Neurological:  Positive for headaches. Negative for dizziness, weakness and numbness.       Physical Exam  Physical Exam  Vitals and nursing note reviewed.   Constitutional:       General: She is not in acute distress.     Appearance: She is well-developed. She is not ill-appearing or toxic-appearing.   HENT:      Head:  Normocephalic. Contusion present. No raccoon eyes, Ellison's sign, abrasion or laceration.        Mouth/Throat:      Mouth: Mucous membranes are moist.   Eyes:      Extraocular Movements: Extraocular movements intact.      Conjunctiva/sclera: Conjunctivae normal.      Pupils: Pupils are equal, round, and reactive to light.      Comments: EOMI. PERRLA   Cardiovascular:      Rate and Rhythm: Normal rate and regular rhythm.      Heart sounds: No murmur heard.  Pulmonary:      Effort: Pulmonary effort is normal. No respiratory distress.      Breath sounds: Normal breath sounds. No stridor. No wheezing, rhonchi or rales.   Chest:      Chest wall: Tenderness present. No crepitus.          Comments: TTP overlying right ribs   Abdominal:      Palpations: Abdomen is soft.      Tenderness: There is no abdominal tenderness. There is no guarding or rebound.   Musculoskeletal:      Right shoulder: Bony tenderness present. Decreased range of motion.      Left shoulder: Normal. No bony tenderness. Normal range of motion.      Right upper arm: Bony tenderness present.      Left upper arm: Normal. No bony tenderness.      Right elbow: Decreased range of motion. Tenderness present.      Left elbow: Normal. Normal range of motion. No tenderness.      Right forearm: Normal. No bony tenderness.      Left forearm: Normal. No bony tenderness.      Right wrist: Normal. No bony tenderness. Normal range of motion. Normal pulse.      Left wrist: Normal. No bony tenderness. Normal range of motion. Normal pulse.      Right hand: Normal. No bony tenderness. Normal range of motion. Normal sensation. Normal pulse.      Left hand: Normal. No bony tenderness. Normal range of motion. Normal sensation. Normal pulse.      Cervical back: Neck supple. No tenderness or bony tenderness. No pain with movement.      Thoracic back: Normal. No tenderness or bony tenderness.      Lumbar back: Normal. No tenderness or bony tenderness.      Right hip: No bony  tenderness. Normal range of motion.      Left hip: No bony tenderness. Normal range of motion.      Right upper leg: Bony tenderness present.      Left upper leg: No bony tenderness.      Right knee: No bony tenderness. Normal range of motion.      Left knee: No bony tenderness. Normal range of motion.      Right lower leg: Bony tenderness present. No tenderness. No edema.      Left lower leg: No tenderness or bony tenderness. No edema.      Right ankle: Normal. No tenderness. Normal range of motion. Normal pulse.      Right foot: Normal. Normal range of motion. No bony tenderness. Normal pulse.      Left foot: Normal. Normal range of motion. No bony tenderness. Normal pulse.   Skin:     General: Skin is warm and dry.      Capillary Refill: Capillary refill takes less than 2 seconds.   Neurological:      General: No focal deficit present.      Mental Status: She is alert and oriented to person, place, and time.      GCS: GCS eye subscore is 4. GCS verbal subscore is 5. GCS motor subscore is 6.      Cranial Nerves: No cranial nerve deficit or facial asymmetry.      Sensory: Sensation is intact.      Motor: Motor function is intact. No weakness or pronator drift.      Coordination: Coordination is intact. Coordination normal. Finger-Nose-Finger Test normal.         Vital Signs  ED Triage Vitals [05/04/24 1913]   Temperature Pulse Respirations Blood Pressure SpO2   97.7 °F (36.5 °C) 62 16 (!) 195/86 100 %      Temp Source Heart Rate Source Patient Position - Orthostatic VS BP Location FiO2 (%)   Oral Monitor Lying Right arm --      Pain Score       10 - Worst Possible Pain           Vitals:    05/04/24 1913 05/04/24 2116   BP: (!) 195/86 166/76   Pulse: 62 68   Patient Position - Orthostatic VS: Lying Lying         Visual Acuity      ED Medications  Medications   lidocaine (LIDODERM) 5 % patch 2 patch (2 patches Topical Medication Applied 5/4/24 2017)   acetaminophen (TYLENOL) tablet 975 mg (975 mg Oral Given 5/4/24  2010)       Diagnostic Studies  Results Reviewed       None                   CT head without contrast   Final Result by Elaine Lynn MD (05/04 2227)      No acute intracranial abnormality.      Stable 1.1 cm calcified lesion within the right frontal lobe. This was previously characterized with MRI and favored to be benign.                  Workstation performed: [id number]         CT spine cervical without contrast   Final Result by Elaine Lynn MD (05/04 2238)      No cervical spine fracture or traumatic malalignment.                  Workstation performed: [id number]         CT chest without contrast   Final Result by Uri Rivera DO (05/04 2230)      Contour deformities of the lateral right fourth, fifth, sixth, and ninth ribs are present suspicious for nondisplaced rib fractures (series 5, axial images 39, 47, 54, and 86 respectively.      There is at least moderate centrilobular and paraseptal emphysematous changes. Additionally there are are peripheral circumferential reticulations with areas of honeycombing suggestive of interstitial lung disease such as usual interstitial pneumonia.    Region of superimposed consolidative airspace opacity at the left lower lower lobe which in the correct clinical setting may reflect atelectasis or pneumonia (2/103). Correlate clinically.      There is a 5 mm fissural node along the right minor fissure (2/82).                  Workstation performed: [id number]         XR shoulder 2+ views RIGHT   ED Interpretation by Geneva Avalos PA-C (05/04 2342)   No obvious fracture or dislocation as interpreted by me         XR humerus RIGHT   ED Interpretation by Geneva Avalos PA-C (05/04 2342)   No obvious fracture or dislocation as interpreted by me         XR femur 2 views RIGHT   ED Interpretation by Geneva Avalos PA-C (05/04 2342)   No obvious fracture or dislocation as interpreted by me         XR tibia fibula 2 views RIGHT   ED  Interpretation by Geneva Avalos PA-C (05/04 2342)   No obvious fracture or dislocation as interpreted by me                  Procedures  Procedures         ED Course           SBIRT 20yo+      Flowsheet Row Most Recent Value   Initial Alcohol Screen: US AUDIT-C     1. How often do you have a drink containing alcohol? 0 Filed at: 05/04/2024 1914   2. How many drinks containing alcohol do you have on a typical day you are drinking?  0 Filed at: 05/04/2024 1914   3a. Male UNDER 65: How often do you have five or more drinks on one occasion? 0 Filed at: 05/04/2024 1914   3b. FEMALE Any Age, or MALE 65+: How often do you have 4 or more drinks on one occassion? 0 Filed at: 05/04/2024 1914   Audit-C Score 0 Filed at: 05/04/2024 1914   GENTRY: How many times in the past year have you...    Used an illegal drug or used a prescription medication for non-medical reasons? Never Filed at: 05/04/2024 1914              Medical Decision Making  DDx including but not limited to: intracranial injury, concussion, cervical injury, rib fracture, rib contusion, extremity injury--fracture, dislocation, strain, sprain, contusion.      Will obtain XRs of areas of tenderness, as well as CT head, cervical spine, and chest    Imaging findings discussed with patient and family.  Discussed with patient and family incidental findings and need for PCP follow-up, they verbalized understanding and agreement.  Also discussed need for orthopedic follow-up for ongoing right arm pain.  They are agreeable. will discuss case with trauma given multiple rib fractures though patient is without dyspnea, satting 99% on room air, in no acute distress.    Right arm placed in sling by ED staff.    Case discussed with Dr. Raines (Trauma Surgery). Pt stable for d/c from trauma standpoint.    At the time of discharge, the patient is in no acute distress. I discussed with the patient the diagnosis, treatment plan, follow-up, return precautions, and  discharge instructions; they were given the opportunity to ask questions and verbalized understanding.    Problems Addressed:  Closed traumatic nondisplaced fracture of four ribs: acute illness or injury  Degenerative disc disease, cervical: chronic illness or injury  Fall, initial encounter: acute illness or injury  Hiatal hernia: chronic illness or injury  Lung nodule: chronic illness or injury  Right arm pain: acute illness or injury  Right shoulder pain: acute illness or injury  Thyroid nodule: chronic illness or injury    Amount and/or Complexity of Data Reviewed  External Data Reviewed: labs and notes.  Radiology: ordered. Decision-making details documented in ED Course.    Risk  OTC drugs.  Prescription drug management.             Disposition  Final diagnoses:   Fall, initial encounter   Closed traumatic nondisplaced fracture of four ribs   Right arm pain   Right shoulder pain   Degenerative disc disease, cervical   Thyroid nodule   Lung nodule   Hiatal hernia     Time reflects when diagnosis was documented in both MDM as applicable and the Disposition within this note       Time User Action Codes Description Comment    5/4/2024 11:11 PM Geneva Avalos Add [W19.XXXA] Fall, initial encounter     5/4/2024 11:12 PM Geneva Avalos Add [S22.49XA] Closed traumatic nondisplaced fracture of four ribs     5/4/2024 11:15 PM Geneva Avalos Add [M79.601] Right arm pain     5/4/2024 11:15 PM Geneva Avalos Add [M25.511] Right shoulder pain     5/4/2024 11:15 PM Geneva Avalos Add [M50.30] Degenerative disc disease, cervical     5/4/2024 11:15 PM Geneva Avalos Add [E04.1] Thyroid nodule     5/4/2024 11:17 PM eGneva Avalos Add [R91.1] Lung nodule     5/4/2024 11:17 PM Geneva Avalos Add [K44.9] Hiatal hernia           ED Disposition       ED Disposition   Discharge    Condition   Stable    Date/Time   Sat May 4, 2024 2311    Comment   Fallon BARROW Esthela discharge to home/self care.                    Follow-up Information       Follow up With Specialties Details Why Contact Info Additional Information    [facility]   [address]  [address]  [facility] [address] [phone] [facility] [place], [facility] [address], [place], [place], [address] [phone]    Wali Byrd MD Internal Medicine   [address] [address]  [phone]               Patient's Medications   Discharge Prescriptions    ACETAMINOPHEN (TYLENOL) 500 MG TABLET    Take 1 tablet (500 mg total) by mouth every 6 (six) hours as needed for mild pain or moderate pain       Start Date: 5/4/2024  End Date: --       Order Dose: 500 mg       Quantity: 30 tablet    Refills: 0    LIDOCAINE (LIDODERM) 5 %    Apply 1 patch topically over 12 hours every 24 hours Remove & Discard patch within 12 hours or as directed by MD       Start Date: 5/4/2024  End Date: --       Order Dose: 1 patch       Quantity: 15 patch    Refills: 0           PDMP Review       None            ED Provider  Electronically Signed by             Geneva Avalos PA-C  05/04/24 7688

## 2024-05-04 NOTE — TELEPHONE ENCOUNTER
"Reason for Disposition  • [1] Caller requesting a prescription renewal (no refills left), no triage required, AND [2] triager able to renew prescription per department policy    Answer Assessment - Initial Assessment Questions  1. DRUG NAME: \"What medicine do you need to have refilled?\"      Amlodipine 5 mg / tablet- take one tablet by mouth once daily    2. REFILLS REMAINING: \"How many refills are remaining?\" (Note: The label on the medicine or pill bottle will show how many refills are remaining. If there are no refills remaining, then a renewal may be needed.)      No    3. EXPIRATION DATE: \"What is the expiration date?\" (Note: The label states when the prescription will , and thus can no longer be refilled.)      N/a    4. PRESCRIBING HCP: \"Who prescribed it?\" Reason: If prescribed by specialist, call should be referred to that group.      Dr. Byrd    Protocols used: Medication Refill and Renewal Call-ADULT-      Per after hr standing order protocol. Temporary 7 day Rx sent in for pt. Pt made aware to follow up with office on Monday for remainder of her amlodipine Rx.     "

## 2024-05-06 ENCOUNTER — TELEPHONE (OUTPATIENT)
Dept: INTERNAL MEDICINE CLINIC | Age: 89
End: 2024-05-06

## 2024-05-06 DIAGNOSIS — I10 ESSENTIAL HYPERTENSION: ICD-10-CM

## 2024-05-06 RX ORDER — AMLODIPINE BESYLATE 5 MG/1
5 TABLET ORAL DAILY
Qty: 90 TABLET | Refills: 1 | Status: SHIPPED | OUTPATIENT
Start: 2024-05-06 | End: 2024-05-07 | Stop reason: SDUPTHER

## 2024-05-06 NOTE — TELEPHONE ENCOUNTER
05/06/24 1:39 PM    Patient contacted post ED visit, first outreach attempt made. Message was left for patient to return a call to the [facility] Department at [facility]: Phone [phone].    Thank you.  Mara Moreira  [facility]

## 2024-05-07 ENCOUNTER — OFFICE VISIT (OUTPATIENT)
Dept: INTERNAL MEDICINE CLINIC | Facility: OTHER | Age: 89
End: 2024-05-07
Payer: COMMERCIAL

## 2024-05-07 VITALS
TEMPERATURE: 98.5 F | WEIGHT: 125 LBS | SYSTOLIC BLOOD PRESSURE: 138 MMHG | OXYGEN SATURATION: 99 % | BODY MASS INDEX: 22.15 KG/M2 | DIASTOLIC BLOOD PRESSURE: 60 MMHG | HEIGHT: 63 IN | HEART RATE: 63 BPM

## 2024-05-07 DIAGNOSIS — K21.9 GASTROESOPHAGEAL REFLUX DISEASE WITHOUT ESOPHAGITIS: ICD-10-CM

## 2024-05-07 DIAGNOSIS — N18.31 STAGE 3A CHRONIC KIDNEY DISEASE (HCC): ICD-10-CM

## 2024-05-07 DIAGNOSIS — E78.2 MIXED HYPERLIPIDEMIA: ICD-10-CM

## 2024-05-07 DIAGNOSIS — I10 ESSENTIAL HYPERTENSION: ICD-10-CM

## 2024-05-07 DIAGNOSIS — F41.9 ANXIETY: Primary | ICD-10-CM

## 2024-05-07 DIAGNOSIS — M25.511 ACUTE PAIN OF RIGHT SHOULDER: ICD-10-CM

## 2024-05-07 DIAGNOSIS — S22.41XA MULTIPLE FRACTURES OF RIBS, RIGHT SIDE, INITIAL ENCOUNTER FOR CLOSED FRACTURE: ICD-10-CM

## 2024-05-07 PROCEDURE — G2211 COMPLEX E/M VISIT ADD ON: HCPCS | Performed by: INTERNAL MEDICINE

## 2024-05-07 PROCEDURE — 99214 OFFICE O/P EST MOD 30 MIN: CPT | Performed by: INTERNAL MEDICINE

## 2024-05-07 RX ORDER — OMEPRAZOLE 40 MG/1
40 CAPSULE, DELAYED RELEASE ORAL DAILY
Qty: 90 CAPSULE | Refills: 1 | Status: SHIPPED | OUTPATIENT
Start: 2024-05-07

## 2024-05-07 RX ORDER — ATORVASTATIN CALCIUM 20 MG/1
20 TABLET, FILM COATED ORAL DAILY
Qty: 90 TABLET | Refills: 1 | Status: SHIPPED | OUTPATIENT
Start: 2024-05-07

## 2024-05-07 RX ORDER — AMLODIPINE BESYLATE 5 MG/1
5 TABLET ORAL DAILY
Qty: 90 TABLET | Refills: 1 | Status: SHIPPED | OUTPATIENT
Start: 2024-05-07

## 2024-05-07 RX ORDER — SERTRALINE HYDROCHLORIDE 25 MG/1
25 TABLET, FILM COATED ORAL DAILY
Qty: 30 TABLET | Refills: 2 | Status: SHIPPED | OUTPATIENT
Start: 2024-05-07 | End: 2025-05-02

## 2024-05-07 NOTE — ASSESSMENT & PLAN NOTE
CT scan chest done in the emergency room reviewed.  Nondisplaced multiple right-sided rib fracture noted.  She will take Tylenol 2 extra strength tablet 3 times a day as needed can also use Lidoderm 4% over-the-counter patch if needed.  Presently she is comfortable

## 2024-05-07 NOTE — PROGRESS NOTES
Assessment/Plan:    Essential hypertension  Blood pressure is stable on present regimen.  Continue with low-salt diet    Gastroesophageal reflux disease without esophagitis  Stable on present dose of PPI.    CKD (chronic kidney disease) stage 3, GFR 30-59 ml/min (Formerly [facility])  Lab Results   Component Value Date    EGFR 75 03/22/2024    EGFR 81 12/04/2023    EGFR 84 11/30/2023    CREATININE 0.76 03/22/2024    CREATININE 0.72 12/04/2023    CREATININE 0.68 11/30/2023   Renal function is stable.  Will continue to monitor    Acute pain of right shoulder  X-ray right humerus was unremarkable.  She was referred to orthopedic surgery through ER and discussed with patient and her daughter now to make the appointment to be seen as soon as possible.  In the meanwhile she can take Tylenol as needed.    Multiple fractures of ribs, right side, initial encounter for closed fracture  CT scan chest done in the emergency room reviewed.  Nondisplaced multiple right-sided rib fracture noted.  She will take Tylenol 2 extra strength tablet 3 times a day as needed can also use Lidoderm 4% over-the-counter patch if needed.  Presently she is comfortable    Anxiety  Started on sertraline 25 mg daily.  Follow-up in 6-week and dose will be adjusted accordingly.       Diagnoses and all orders for this visit:    Anxiety  -     sertraline (ZOLOFT) 25 mg tablet; Take 1 tablet (25 mg total) by mouth daily    Gastroesophageal reflux disease without esophagitis  -     omeprazole (PriLOSEC) 40 MG capsule; Take 1 capsule (40 mg total) by mouth daily    Essential hypertension  -     amLODIPine (NORVASC) 5 mg tablet; Take 1 tablet (5 mg total) by mouth daily    Mixed hyperlipidemia  -     atorvastatin (LIPITOR) 20 mg tablet; Take 1 tablet (20 mg total) by mouth daily    Acute pain of right shoulder    Stage 3a chronic kidney disease (HCC)    Multiple fractures of ribs, right side, initial encounter for closed fracture            Depression Screening and  Follow-up Plan: Patient was screened for depression during today's encounter. They screened negative with a PHQ-2 score of 0.        Subjective:          Patient ID: Fallon Proctor is a 88 y.o. female.    This is a follow-up after ER visit.  She felt at discharge which was mechanical fall in nature.  And noted to have right fourth, fifth, sixth and ninth rib fracture nondisplaced.  CT scan of brain, cervical spines was negative.  X-ray of hip and right femur and tibia-fibula were also negative for fracture.  X-ray right humerus was also negative for fracture but she is having difficulty in raising her right arm.  Denies daily tingling numbness in the right forearm and hand.  No loss of consciousness.        The following portions of the patient's history were reviewed and updated as appropriate: allergies, current medications, past family history, past medical history, past social history, past surgical history, and problem list.    Review of Systems   Constitutional:  Negative for fatigue and fever.   HENT:  Negative for congestion, ear discharge, ear pain, postnasal drip, sinus pressure, sore throat, tinnitus and trouble swallowing.    Eyes:  Negative for discharge, itching and visual disturbance.   Respiratory:  Negative for cough and shortness of breath.    Cardiovascular:  Negative for chest pain and palpitations.   Gastrointestinal:  Negative for abdominal pain, diarrhea, nausea and vomiting.   Endocrine: Negative for cold intolerance and polyuria.   Genitourinary:  Negative for difficulty urinating, dysuria and urgency.   Musculoskeletal:  Positive for arthralgias. Negative for neck pain.   Skin:  Negative for rash.   Allergic/Immunologic: Negative for environmental allergies.   Neurological:  Negative for dizziness, weakness and headaches.   Psychiatric/Behavioral:  Negative for agitation and behavioral problems. The patient is not nervous/anxious.          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)     Last Assessed:3/18/2014         Current Outpatient Medications:     acetaminophen (TYLENOL) 500 mg tablet, Take 1 tablet (500 mg total) by mouth every 6 (six) hours as needed for mild pain or moderate pain, Disp: 30 tablet, Rfl: 0    amLODIPine (NORVASC) 5 mg tablet, Take 1 tablet (5 mg total) by mouth daily, Disp: 90 tablet, Rfl: 1    atorvastatin (LIPITOR) 20 mg tablet, Take 1 tablet (20 mg total) by mouth daily, Disp: 90 tablet, Rfl: 1    Cholecalciferol (VITAMIN D3 PO), Take 1 capsule by mouth daily 2000, Disp: , Rfl:     Cranberry-Vitamin C-Probiotic (AZO CRANBERRY PO), Take by mouth as needed, Disp: , Rfl:     lidocaine (LIDODERM) 5 %, Apply 1 patch topically over 12 hours every 24 hours Remove & Discard patch within 12 hours or as directed by MD, Disp: 15 patch, Rfl: 0    LORazepam (ATIVAN) 0.5 mg tablet, Take 1 tablet (0.5 mg total) by mouth as needed (help sleep), Disp: 30 tablet, Rfl: 0    omeprazole (PriLOSEC) 40 MG capsule, Take 1 capsule (40 mg total) by mouth daily, Disp: 90 capsule, Rfl: 1    sertraline (ZOLOFT) 25 mg tablet, Take 1 tablet (25 mg total) by mouth daily, Disp: 30 tablet, Rfl: 2    acetaminophen (TYLENOL) 500 mg tablet, Take 1 tablet (500 mg total) by mouth every 4 (four) hours as needed (knee and back pain) (Patient not taking: Reported on 5/7/2024), Disp: 90 tablet, Rfl: 1    Current Facility-Administered Medications:     denosumab (PROLIA) subcutaneous injection 60 mg, 60 mg, Subcutaneous, Q6 Months, Wali Byrd MD, 60 mg at 09/18/23 2921    Allergies   Allergen Reactions    Flexeril  "[Cyclobenzaprine] Syncope    Daypro [Oxaprozin]      Unknown allergic reaction    Minocycline Other (See Comments)     Loss of taste       Social History   Past Surgical History:   Procedure Laterality Date    APPENDECTOMY      CATARACT EXTRACTION, BILATERAL      HYSTERECTOMY      INCISIONAL BREAST BIOPSY       Family History   Problem Relation Age of Onset    Diabetes Mother     Varicose Veins Mother     Diabetes Brother        Objective:  /60 (BP Location: Left arm, Patient Position: Sitting, Cuff Size: Standard)   Pulse 63   Temp 98.5 °F (36.9 °C) (Temporal)   Ht 5' 3\" (1.6 m)   Wt 56.7 kg (125 lb) Comment: with shoes on  SpO2 99% Comment: ra  BMI 22.14 kg/m²   Body mass index is 22.14 kg/m².     Physical Exam  Constitutional:       General: She is not in acute distress.     Appearance: She is well-developed.   HENT:      Head: Normocephalic.      Right Ear: External ear normal. There is no impacted cerumen.      Left Ear: External ear normal. There is no impacted cerumen.      Nose: No congestion or rhinorrhea.      Mouth/Throat:      Pharynx: No posterior oropharyngeal erythema.   Eyes:      General: No scleral icterus.     Pupils: Pupils are equal, round, and reactive to light.   Neck:      Thyroid: No thyromegaly.      Trachea: No tracheal deviation.   Cardiovascular:      Rate and Rhythm: Normal rate and regular rhythm.      Heart sounds: Normal heart sounds.   Pulmonary:      Effort: Pulmonary effort is normal. No respiratory distress.      Breath sounds: Normal breath sounds.   Chest:      Chest wall: No tenderness.   Abdominal:      General: Bowel sounds are normal.      Palpations: Abdomen is soft. There is no mass.      Tenderness: There is no abdominal tenderness.   Musculoskeletal:      Cervical back: Normal range of motion and neck supple. No rigidity.      Right lower leg: No edema.      Left lower leg: No edema.      Comments: Limited range of movement of right arm especially elevation " above shoulder and internal rotation noted   Lymphadenopathy:      Cervical: No cervical adenopathy.   Skin:     General: Skin is warm.      Findings: No erythema or rash.   Neurological:      Mental Status: She is alert and oriented to person, place, and time.      Cranial Nerves: No cranial nerve deficit.   Psychiatric:         Mood and Affect: Mood normal.         Behavior: Behavior normal.      Comments: Appears anxious.

## 2024-05-07 NOTE — ASSESSMENT & PLAN NOTE
X-ray right humerus was unremarkable.  She was referred to orthopedic surgery through ER and discussed with patient and her daughter now to make the appointment to be seen as soon as possible.  In the meanwhile she can take Tylenol as needed.

## 2024-05-07 NOTE — ASSESSMENT & PLAN NOTE
Lab Results   Component Value Date    EGFR 75 03/22/2024    EGFR 81 12/04/2023    EGFR 84 11/30/2023    CREATININE 0.76 03/22/2024    CREATININE 0.72 12/04/2023    CREATININE 0.68 11/30/2023   Renal function is stable.  Will continue to monitor

## 2024-05-09 ENCOUNTER — OFFICE VISIT (OUTPATIENT)
Dept: OBGYN CLINIC | Facility: CLINIC | Age: 89
End: 2024-05-09
Payer: COMMERCIAL

## 2024-05-09 VITALS
DIASTOLIC BLOOD PRESSURE: 60 MMHG | HEIGHT: 63 IN | BODY MASS INDEX: 22.15 KG/M2 | HEART RATE: 66 BPM | WEIGHT: 125 LBS | SYSTOLIC BLOOD PRESSURE: 140 MMHG

## 2024-05-09 DIAGNOSIS — M19.019 AC JOINT ARTHROPATHY: ICD-10-CM

## 2024-05-09 DIAGNOSIS — M79.601 RIGHT ARM PAIN: ICD-10-CM

## 2024-05-09 DIAGNOSIS — M67.911 ROTATOR CUFF DYSFUNCTION, RIGHT: Primary | ICD-10-CM

## 2024-05-09 DIAGNOSIS — M25.60 LIMITED JOINT RANGE OF MOTION (ROM): ICD-10-CM
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
## 2024-05-09 PROCEDURE — 99214 OFFICE O/P EST MOD 30 MIN: CPT | Performed by: FAMILY MEDICINE

## 2024-05-09 NOTE — PROGRESS NOTES
Assessment:     1. Rotator cuff dysfunction, right  Ambulatory Referral to Physical Therapy      2. Right arm pain  Ambulatory Referral to Orthopedic Surgery    Ambulatory Referral to Physical Therapy      3. AC joint arthropathy  Ambulatory Referral to Orthopedic Surgery    Ambulatory Referral to Physical Therapy      4. Fall, initial encounter  Ambulatory Referral to Physical Therapy      5. Limited joint range of motion (ROM)  Ambulatory Referral to Physical Therapy        Orders Placed This Encounter   Procedures    Ambulatory Referral to Physical Therapy        Impression:   Right shoulder pain likely secondary to AC joint arthropathy and rotator cuff dysfunction.    Date of injury: 05/04/2024  Mechanism of injury: Mechanical trip and fall  Follow-up from injury 5 days    Conservative Management   We discussed different treatment options:  Reviewed ED documentation completed on 05/04/2024.  Treatment plan consisted of multiple x-rays as well as referral to Ortho/sports medicine.  Ice or Heat Therapy as needed 1-2 times daily for 10-20 minutes. As tolerated.   Over the counter Tylenol and/or NSAIDs  as needed based off your Past Medical Hx. Please follow product label for dosing and maximum limits.    Trial of over the counter Topical Analgesics such as Lidocaine cream or Voltaren Gel, as tolerated. If skin becomes irritated, discontinue use.   Formal Handout provided on General Information of shoulder exercises.  Please range joint through gentle range of motion as tolerated.   Initiate Formal Physical Therapy at any preferred location.  Prescription provided for home PT.        Imaging   Reviewed prior xrays obtain in Urgent or Emergency Department. These were reviewed in office with patient today.   05/04/2024.  ED completed CT cervical spine, CT chest, CT head, x-ray tib-fib on the right, x-ray femur on the right.  05/04/2024 right shoulder x-ray: No acute osseous abnormality  05/04/2024 right humerus x-ray:  No acute osseous abnormality    Procedure  No Injection performed today. May consider future corticosteroid injection depending on clinical exam/imaging.    Shared decision making, patient agreeable to plan.      Return for after June 14th for follow up .    HPI:   Fallon Proctor is a 88 y.o. female  who presents for evaluation of   Chief Complaint   Patient presents with    Right Shoulder - Pain       Occupation: Retired   Injury Related: Yes, Date of Injury: 05/04/2024    Onset/Mechanism: mechanical trip and fall landing on right shoulder.  Location: Mid humerus.  Severity: Current severity: 7/10.  Pain described as: Pressure.   Radiation: Denies.  Provocative: Shoulder abduction, shoulder flexion, shoulder extension.   Associated symptoms:  Limited range of motion.  Numbness in the fingers.  However this was present prior to the fall.    Denies any hx of fracture of affected limb.   Denies any surgical history of affected limb.      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)     Last Assessed:3/18/2014     Past Surgical History:   Procedure Laterality Date    APPENDECTOMY      CATARACT EXTRACTION, BILATERAL      HYSTERECTOMY      INCISIONAL BREAST BIOPSY       Family History   Problem Relation Age of Onset    Diabetes Mother     Varicose Veins Mother     Diabetes Brother        Social History     Substance and Sexual Activity   Alcohol Use Yes    Comment: socially     Social History     Substance and Sexual  "Activity   Drug Use No     Social History     Tobacco Use   Smoking Status Never   Smokeless Tobacco Never       Social Determinants of Health     Tobacco Use: Low Risk  (5/9/2024)    Patient History     Smoking Tobacco Use: Never     Smokeless Tobacco Use: Never     Passive Exposure: Not on file   Alcohol Use: Not At Risk (5/24/2021)    AUDIT-C     Frequency of Alcohol Consumption: 2-4 times a month     Average Number of Drinks: 1 or 2     Frequency of Binge Drinking: Never   Financial Resource Strain: Medium Risk (6/26/2023)    Overall Financial Resource Strain (CARDIA)     Difficulty of Paying Living Expenses: Somewhat hard   Food Insecurity: Not on file   Transportation Needs: No Transportation Needs (6/26/2023)    PRAPARE - Transportation     Lack of Transportation (Medical): No     Lack of Transportation (Non-Medical): No   Physical Activity: Not on file   Stress: Not on file   Social Connections: Not on file   Intimate Partner Violence: Not on file   Depression: Not at risk (5/7/2024)    PHQ-2     PHQ-2 Score: 0   Housing Stability: Not on file   Utilities: Not on file   Health Literacy: Not on file        Allergies   Allergen Reactions    Flexeril [Cyclobenzaprine] Syncope    Daypro [Oxaprozin]      Unknown allergic reaction    Minocycline Other (See Comments)     Loss of taste       Review of Systems      Review of Systems     Review of Systems   Constitutional: Negative for chills and fever.   HENT: Negative for drooling and sneezing.    Eyes: Negative for redness.   Respiratory: Negative for cough and wheezing.    Gastrointestinal: Negative for vomiting.   Psychiatric/Behavioral: Negative for behavioral problems. The patient is not nervous/anxious.      All other systems negative.   Physical Exam   Physical Exam    Vitals and nursing note reviewed.  Constitutional:   Appearance. Normal Appearance.  /60   Pulse 66   Ht 5' 3\" (1.6 m)   Wt 56.7 kg (125 lb)   BMI 22.14 kg/m²     Body mass index is " 22.14 kg/m².   HENT:  Head: Atraumatic.  Nose: Nose normal  Eyes: Conjunctiva/sclera: Conjunctivae normal.  Cardiovascular:   Rate and Rhythm: Bilateral equal distal pulses  Pulmonary:   Effort: Pulmonary effort is normal  Skin:   General: Skin is warm and dry.  Neurological:   General: No focal deficit present.  Mental Status: Alert and oriented to person, place, and time.   Psychiatric:   Mood and Affect: mood normal.  Behavior: Behavior normal     Musculoskeletal Exam     Ortho Exam     Right shoulder:     INSPECTION:  Gross deformity Increased warmth   Negative Neg.     PALPATION/TENDERNESS:  Acromion Clavicle Scapula/spine of scapula AC joint   Negative Neg. Neg. Mild discomfort.     Subacromial bursa Long head of the biceps Coracoid process Trapezius/periscapular region   Neg. Neg. Neg. Neg.     RANGE OF MOTION:  C-Spine Flexion C-Spine Extension C-Spine Sidebending C-Spine Rotation    intact Limited Limited intact     Internal rotation in 90 degrees Abduction External rotation in 90 degrees Abduction Internal rotation in Adduction External rotation in Adduction     Sacrum  intact      Forward Flexion Abduction   Limited, 90 degrees Limited, 90 degrees     STRENGTH:  Flexion Abduction Adduction   Intact Intact Intact       Okay Sign Finger abduction Thumb extension   Intact, bilaterally Intact, bilaterally Intact, bilaterally       ROTATOR CUFF:  Rotator cuff tear  Supraspinatus  Infraspinatus  Subscapularis    (Drop-Arm) (Empty can) (External rotation against resistance) (Belly press)   negative Discomfort without weakness Discomfort with weakness negative     IMPINGEMENT:  Neer's Vizcarra-Christoph         BICEPS TENDINOPATHY:  Resisted forward flexion  Resisted supination   (Speed's) (Yergason's):    Negative  N/a      AC JOINT:  Forced cross body adduction (Scarf cross-arm) Job's AC compression   Mild discomfort not reproducing chief complaint N/a      LABRUM:  Camargo's: Labral Crank Test:     N/a   "    APPREHENSION  Apprehension Rigoberto's Relocation Maneuver:    N/A N/A     Special test:  Spurlings    N/A       Distal Sensation  Radial Pulse   Intact Bilaterally  Present and Equal Bilaterally               Procedures       Portions of the record may have been created with voice recognition software. Occasional wrong word or \"sound alike\" substitutions may have occurred due to the inherent limitations of voice recognition software. Please review the chart carefully and recognize, using context, where substitutions/typographical errors may have occurred.   "

## 2024-05-09 NOTE — PATIENT INSTRUCTIONS
Shoulder Exercises   WHAT YOU NEED TO KNOW:   What do I need to know about rotator cuff injury exercises?  Exercises help improve shoulder movement and strength, and decrease pain. Your physical therapist or healthcare provider will tell you when to start doing the exercises. He or she will tell you how often to do them. You will need to start slowly to prevent more injury. You will move through several levels over time as you get stronger and more flexible.       What are some safety guidelines to follow?   Always warm up before you do the exercises.  Walk or ride a stationary bike for 5 to 10 minutes to help you warm up.    Do not put your arm over your head until directed.  You will need to wait until your injury has healed. The movement of some exercises could continue until your arm is over your head. You will need to stop the movement where directed.    Stop if you feel pain.  You may feel some tight or stiff areas when you start. This should get better as you continue the exercises. You should not feel pain. Pain means you are not ready to do the exercise yet. Stop and call your physical therapist or healthcare provider right away.    Always work both rotator cuffs.  Even if your injury is only on 1 side, it is important to do each exercise on both sides. This helps prevent injury and maintain balance in your shoulders and back.    Use correct posture.  Your physical therapist or healthcare provider will show you the proper posture for each exercise. You will be shown how to pull your shoulders back and down to engage the correct muscles. Remember not to let your shoulders shrug during an exercise unless it is part of the movement.    How should I do stretching exercises?  You will not feel every exercise in your shoulder area. You may feel some of the stretches in your back, side, or upper arm muscles. You need to work muscles in and around your rotator cuffs and down your arms. This helps stabilize your  shoulders. Your physical therapist or healthcare provider will tell you how long to hold each stretch. He or she will also tell you how many times to repeat each stretch during an exercise session. You may be told to do only certain exercises, or to do them in a specific order. The following are general directions to help you remember the exercises you are taught:  Pendulum swings:  Lean forward and rest your arm on a table. Do not round your back or lock your knees during the exercise. Let your other arm hang freely by your side. Gently swing your free arm forward and backward, side to side, and in circles.         Crossover arm stretch:  Relax your shoulders. Hold your upper arm with the opposite hand. Pull your arm across your chest until you feel a stretch. Hold the stretch. Return to the starting position.         Sleeper stretch:  Lie on your side on a firm, flat surface. Bend the elbow of your top arm 90°. Use your other hand to slowly push your arm down. Stop when you feel a stretch at the back of your shoulder. Hold the stretch. Slowly return to the starting position.         Shoulder movement, up and down:  This exercise may also be called shoulder extension. Sit in a chair that has a back but no armrests. Raise your arm like you are reaching for the wall in front of you. Continue to raise the arm until it is over your head, or as high as directed. Bring your arm back down to your side. Bring it back as far as possible behind your body. Return your arm to the starting position.         Shoulder movement, side to side:  These movements may be called flexion, internal rotation, and external rotation. Sit in a chair that has a back but no armrests. Raise your arm to the side and then up over your head as far as directed. Return your arm to your side. Bring your arm across the front of your body and reach for the opposite shoulder. Return your arm to the starting position.         Shoulder rolls:  Stand and  raise both shoulders toward your ears. Lower them to the starting position. Relax your shoulders. Pull your shoulders back. Then relax them again. Roll your shoulders in a smooth [place]. Then roll your shoulders in a smooth [place] in the other direction.         Wall reach exercise:  This may also be called a flexion stretch. Stand facing a wall. Slowly walk your fingers up the wall until you feel a stretch. Hold the stretch. Return to the starting position.         Arm reach exercise:  Lie on your back with your legs straight. Reach your arms like you are trying to touch the ceiling. Reach as high as you can so you feel a stretch in the back of your arms. Hold the stretch. Then lower your arms to your sides.         Elbow bends:  Stand with your arms down to your sides. Keep your palm facing forward. Bend your elbow and try to touch your shoulder with your fingertips. Return your arm to the starting position.         Up the back stretch:  Stand and put both arms behind your back. Put one hand under the other. Move the bottom hand and slowly push the upper hand up toward your head. You should feel a stretch in the front of your arm and shoulder. Be careful not to push too hard. Hold the stretch. Then return to the starting position.         Triceps stretch:  Stand and drop your forearm down your back so your hand is pointing to the ground behind you. Your elbow should be pointing at the ceiling. Take your other hand and place it on your elbow. Gently and slowly push on the elbow until you feel a stretch in the back of your arm. Hold the stretch. Let go of your elbow and relax your arm. You may be shown how to do this stretch with a towel. The towel can be pulled gently with a hand behind your back at waist level.       How should I do strengthening exercises?  Strengthening exercises may include handheld weights or resistance bands. Your physical therapist or healthcare provider will tell you how much weight or  resistance to use. The general guide is to use light weights or low resistance and to do a high number of repetitions. You may be told to do only certain exercises, or to do them in a specific order. The following are general directions to help you remember the exercises you are taught:  Scapular squeeze:  Stand with your arms at your sides. Squeeze your shoulder blades together and hold this position. Then relax the muscles. Keep your shoulder back during the entire exercise.         Wall pushups:  This exercise is similar to a pushup done on the ground. The goal is to use your back and shoulder muscles to bring your upper body toward and away from the wall. Stand facing a wall. Put your hands on the wall. Bend your elbows to bring your upper body toward the wall. Straighten your arms to return to the starting position. Keep your feet close enough to the wall that you do not take a step when you bend your elbows.         Standing row with exercise band:  Wrap the exercise band around a heavy, stable object at waist level. Make loop in the end of the band to create a handle, if needed. Hold the handle or loop and pull the band straight back toward your hip. Keep your shoulder down. Squeeze your shoulder blade. Hold this position. Then slowly return to the starting position.         External rotation with arm abducted 90 degrees:  Wrap the exercise band around a heavy, stable object at waist level. Make loop in the end of the band to create a handle, if needed. Stand and hold the handle or loop. Bend your elbow and raise your arm to shoulder height. Keep your arm in this position. Raise your hand like you are pointing at the ceiling. Slowly return to the starting position. You may also be shown how to do this exercise lying down and with a weight.         Shoulder abduction with weight:  Stand and hold a weight in your hand with your palm facing your body. Slowly raise your arm to the side with your thumb pointing  up. Then raise your arm as far as you can without pain. Hold this position. Then return to the starting position.         Shoulder abduction with exercise band:  Wrap the exercise band around a heavy, stable object near your foot. Grab the band. Keep your arm straight. Slowly raise your arm to the side with your thumb pointing up. Then, slowly pull the band as far as you can without pain. Slowly return to the starting position.         Shoulder adduction with weight:  Lie on your back on a firm surface. Hold a weight in your hand at your shoulder. Slowly raise your arm toward the ceiling and straighten your elbow. Hold this position. Then slowly return to the starting position.         Shoulder adduction with exercise band:  Wrap the exercise band around a heavy, stable object. Stand and face away from where the band is anchored. Hold each end of the band in both hands with your elbows bent. Your elbows should not be behind your body. Keep your arms parallel to the floor and slowly straighten your elbows. Hold this position. Slowly return to the starting position.       When should I call my doctor or physical therapist?   You have sharp or worsening pain during exercise or at rest.    You have questions or concerns about your rotator cuff injury exercises.    CARE AGREEMENT:   You have the right to help plan your care. Learn about your health condition and how it may be treated. Discuss treatment options with your healthcare providers to decide what care you want to receive. You always have the right to refuse treatment. The above information is an  only. It is not intended as medical advice for individual conditions or treatments. Talk to your doctor, nurse or pharmacist before following any medical regimen to see if it is safe and effective for you.  © Copyright [employer] 2023 Information is for End User's use only and may not be sold, redistributed or otherwise used for commercial purposes.  P.R.I.C.E. Treatment   WHAT YOU NEED TO KNOW:   What is P.R.I.C.E. treatment?  P.R.I.C.E. treatment is a 5-step process used to decrease swelling and pain caused by an injury. P.R.I.C.E. stands for protect, rest, ice, compress, and elevate. Start P.R.I.C.E. within 24 to 48 hours of an injury.  How do I use P.R.I.C.E. treatment?       Protect  your injury from more damage. Support the injured area with a brace or splint. Your healthcare provider will tell you how long to use the brace or splint.    Rest  your injured area as directed. You may need to stop using, or keep weight off, the injury for 48 hours or longer. Your healthcare provider may recommend crutches or another device. Return to your usual activities as directed.    Apply ice  on your injured area for 15 to 20 minutes every 4 hours or as directed. Use an ice pack, or put crushed ice in a plastic bag. Cover the bag with a towel before you apply it to your skin. Ice helps prevent tissue damage and decreases swelling and pain.    Compress  (keep pressure on) the injured area. Compression will help decrease swelling and support the injured area. Use an elastic bandage, air stirrup, splint, or sling as directed. If you use an elastic bandage, make sure the bandage is not too tight. You should be able to slip 2 fingers between the bandage and your skin.    Elevate  the injured area above the level of your heart as often as you can. This will help decrease swelling and pain. Prop the injured area on pillows or blankets to keep it elevated comfortably.  When should I seek immediate care?   Your pain is severe.    You have severe swelling or deformity.    You have numbness in the injured area.    When should I call my doctor?   Your pain and swelling do not go away after a few days.    You have questions or concerns about your condition or care.    CARE AGREEMENT:   You have the right to help plan your care. Learn about your health condition and how it may be  treated. Discuss treatment options with your healthcare providers to decide what care you want to receive. You always have the right to refuse treatment. The above information is an  only. It is not intended as medical advice for individual conditions or treatments. Talk to your doctor, nurse or pharmacist before following any medical regimen to see if it is safe and effective for you.  © Copyright [employer] 2023 Information is for End User's use only and may not be sold, redistributed or otherwise used for commercial purposes.

## 2024-05-09 NOTE — TELEPHONE ENCOUNTER
05/09/24 1:44 PM    Patient contacted post ED visit, [facility] phone outreaches documented. Patient called practice and scheduled a follow-up ED visit.     Thank you.  Mara Moreira [facility]

## 2024-05-29 ENCOUNTER — EVALUATION (OUTPATIENT)
Dept: PHYSICAL THERAPY | Facility: REHABILITATION | Age: 89
End: 2024-05-29
Payer: COMMERCIAL
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
## 2024-05-29 DIAGNOSIS — M67.911 ROTATOR CUFF DYSFUNCTION, RIGHT: ICD-10-CM

## 2024-05-29 DIAGNOSIS — M79.601 RIGHT ARM PAIN: ICD-10-CM

## 2024-05-29 DIAGNOSIS — M25.60 LIMITED JOINT RANGE OF MOTION (ROM): ICD-10-CM

## 2024-05-29 DIAGNOSIS — M19.019 AC JOINT ARTHROPATHY: ICD-10-CM

## 2024-05-29 PROCEDURE — 97110 THERAPEUTIC EXERCISES: CPT | Performed by: PHYSICAL THERAPIST

## 2024-05-29 PROCEDURE — 97162 PT EVAL MOD COMPLEX 30 MIN: CPT | Performed by: PHYSICAL THERAPIST

## 2024-05-29 NOTE — PROGRESS NOTES
PT Evaluation     Today's date: 2024  Patient name: Fallon Proctor  : 1935  MRN: [id number]  Referring provider: Leigh Acuna*  Dx:   Encounter Diagnosis     ICD-10-CM    1. Right arm pain  M79.601 Ambulatory Referral to Physical Therapy      2. AC joint arthropathy  M19.019 Ambulatory Referral to Physical Therapy      3. Rotator cuff dysfunction, right  M67.911 Ambulatory Referral to Physical Therapy      4. Fall, initial encounter  W19.XXXA Ambulatory Referral to Physical Therapy      5. Limited joint range of motion (ROM)  M25.60 Ambulatory Referral to Physical Therapy                     Assessment  Impairments: abnormal coordination, abnormal or restricted ROM, activity intolerance, impaired physical strength, lacks appropriate home exercise program, pain with function and unable to perform ADL  Symptom irritability: high    Assessment details: Pt is a pleasant 88 y.o. female presenting to outpatient physical therapy with right arm pain,AC joint arthropathy, rotator cuff dysfunction, right, fall, initial encounter, limited joint range of motion (ROM). Pt presents with right shoulder pain, decreased right shoulder range of motion, decreased right shoulder and elbow strength, and decreased tolerance to activity. Pt presents with signs and symptoms consistent with rotator cuff pathology. Pt is a good candidate for outpatient physical therapy and would benefit from skilled physical therapy to address limitations and to achieve goals. Thank you for this referral.   Understanding of Dx/Px/POC: good     Prognosis: good    Goals  Short-Term Goals (4 weeks)   1. Patient will decrease worst rating of pain by 25% to improve quality of life.  2. Patient will increase strength by 1/2 MMT to improve quality of life with improved efficiency of daily activities.  3. Patient will improve ROM by 25% indicating improved mobility of affected area.    Long-Term Goals (8 weeks)   1. Patient will decrease pain  "by 50% at worst in comparison to IE indicating significant reduction in pain and improved quality of life.  2. Patient will demonstrate strength WFL compared to IE levels indicating ability to independently manage pain symptoms to accomplish daily activities.   3. Patient will be independent with HEP with good form accomplished.      Plan  Patient would benefit from: PT eval and skilled PT  Planned modality interventions: cryotherapy and thermotherapy: hydrocollator packs    Planned therapy interventions: IADL retraining, body mechanics training, flexibility, functional ROM exercises, home exercise program, neuromuscular re-education, manual therapy, postural training, strengthening, stretching, therapeutic activities, therapeutic exercise and joint mobilization    Frequency: 2x week  Duration in visits: 20  Duration in weeks: 12  Treatment plan discussed with: patient        Subjective Evaluation    History of Present Illness  Date of onset: 5/4/2024  Mechanism of injury: trauma  Mechanism of injury: Pt is an 88 year-old right-handed female presenting to PT with 3 week history of right shoulder pain s/p fall. She reports that she was walking by a chair when her heel got caught in the leg of it, causing her to fall onto her right side. Pt reports she went ER, imaging performed CT Scan showed contour deformities of the lateral right fourth, fifth, sixth, and ninth ribs are present suspicious for nondisplaced rib fractures.   Pt went to Dr. Acuna for further evaluation, pt referred to OPPT for her right shoulder.    Pain Location: right shoulder and upper arm    Pain Type: dull, aching, \"snap\"    Pain Intensity:  Current: 5  Best: 0  Worst: 7      Pt reports increased pain and/or difficulty with: cooking, styling her hair, reaching overhead, chopping vegetables, upper body dressing, getting out of the bathtub, pushing through right arm. Pt denies sleep disturbance secondary to pain.    Pt reports decreased " pain with: ice, rest, Lidocaine patch, Tylenol            Not a recurrent problem   Quality of life: good    Patient Goals  Patient goals for therapy: decreased pain, increased motion, increased strength, independence with ADLs/IADLs and return to sport/leisure activities      Diagnostic Tests  X-ray: normal  Treatments  Previous treatment: medication        Objective     Postural Observations    Additional Postural Observation Details  Mild forward head.    Observations     Right Shoulder  Positive for deformity.     Additional Observation Details  Superior humeral and/or AC joint bony prominences     Tenderness     Right Shoulder  Tenderness in the biceps tendon (proximal) and supraspinatus tendon.     Cervical/Thoracic Screen   Cervical range of motion within normal limits    Neurological Testing     Sensation     Shoulder   Left Shoulder   Intact: light touch    Right Shoulder   Intact: Light touch    Active Range of Motion   Left Shoulder   Flexion: 142 degrees   Abduction: 140 degrees   External rotation BTH: T1   Internal rotation BTB: L1     Right Shoulder   Flexion: 95 degrees with pain  Abduction: 100 degrees with pain  External rotation BTH: C2 (with compensatory cervical flexion) with pain  Internal rotation BTB: L5 (with compensatory thoracic flexion) with pain    Joint Play   Left Shoulder  Joints within functional limits are the anterior capsule, posterior capsule and inferior capsule.     Right Shoulder  Hypomobile in the anterior capsule, posterior capsule and inferior capsule.     Strength/Myotome Testing     Left Shoulder     Planes of Motion   Flexion: 5   Abduction: 5   External rotation at 0°: 5   Internal rotation at 0°: 5     Isolated Muscles   Biceps: 5   Triceps: 5     Right Shoulder     Planes of Motion   Flexion: 2-   Abduction: 2-   External rotation at 0°: 2-   Internal rotation at 0°: 3-     Isolated Muscles   Biceps: 4   Triceps: 4-     Tests     Left Shoulder   Negative external  "rotation lag sign and Speed's.     Right Shoulder   Positive external rotation lag sign and Speed's.              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    Gastroesophageal reflux disease without esophagitis    Age-related cataract of both eyes    CKD (chronic kidney disease) stage 3, GFR 30-59 ml/min (HCC)    Hyperglycemia    Insomnia    History of basal cell carcinoma    Pain in both feet    Age-related osteoporosis without current pathological fracture    Closed fracture of bone of right foot    Renal angiomyolipoma    Prediabetes    Acute pain of right shoulder    Syncope    Hyperkalemia    Anxiety    Multiple fractures of ribs, right side, initial encounter for closed fracture       Daily Treatment Diary      Assessment  5/29                     Eval/Reval  MD                     FOTO  50/61       **         **   Manuals    R GH Mobs                        R Shoulder PROM                          Prescribed POC    Pt Education  MD                      HEP Issued/Updated  MD                      Pulleys                        Table Slides: Flexion & Scaption  5\"  1x10                       Table ER Stretch  5\"  1x10                       Scap Retractions  5\"  1x10                       Flexion Wall Slides                        Supine Wand Press                        S/L ER AROM to Neutral                                                                                               Modalities    CP R Shoulder                                    QR Code:    [employer] HEP:  Access Code: [email or web address]  URL: [email or web address]  Date: 05/29/2024  Prepared by: Kathleen Mcfarland    Exercises  - Seated Shoulder Flexion Towel Slide at Table Top  - 1 x daily - 10 reps - 5 hold  - Seated Shoulder Scaption Slide at Table Top with Forearm in Neutral  - 1 x daily - 10 reps " - 5 hold  - Seated Shoulder External Rotation PROM on Table  - 1 x daily - 10 reps - 5 hold  - Seated Scapular Retraction  - 1 x daily - 10 reps - 5 hold    Patient Education  - Ice  - Managing Shoulder Pain  - Shoulder Care After Surgery or Trauma

## 2024-05-30 DIAGNOSIS — S22.49XA: ICD-10-CM
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
## 2024-05-30 DIAGNOSIS — E78.2 MIXED HYPERLIPIDEMIA: ICD-10-CM

## 2024-05-30 DIAGNOSIS — G47.00 INSOMNIA, UNSPECIFIED TYPE: ICD-10-CM

## 2024-05-30 DIAGNOSIS — I10 ESSENTIAL HYPERTENSION: ICD-10-CM

## 2024-05-30 DIAGNOSIS — F41.9 ANXIETY: ICD-10-CM

## 2024-05-30 DIAGNOSIS — K21.9 GASTROESOPHAGEAL REFLUX DISEASE WITHOUT ESOPHAGITIS: ICD-10-CM

## 2024-05-30 NOTE — TELEPHONE ENCOUNTER
Patient called requesting refills of the following medications be sent to the [facility] in [place]  Acetaminophen 500mg  Amlodipine 5mg  Atorvastatin 20mg  Lorazepam .5mg   Omeprazole 40mg  Sertraline 25mg    Please notify patient when sent to pharmacy.

## 2024-05-31 RX ORDER — SERTRALINE HYDROCHLORIDE 25 MG/1
25 TABLET, FILM COATED ORAL DAILY
Qty: 30 TABLET | Refills: 2 | Status: SHIPPED | OUTPATIENT
Start: 2024-05-31 | End: 2025-05-26

## 2024-05-31 RX ORDER — LORAZEPAM 0.5 MG/1
0.5 TABLET ORAL AS NEEDED
Qty: 30 TABLET | Refills: 0 | Status: SHIPPED | OUTPATIENT
Start: 2024-05-31

## 2024-05-31 RX ORDER — ACETAMINOPHEN 500 MG
500 TABLET ORAL EVERY 6 HOURS PRN
Qty: 30 TABLET | Refills: 0 | Status: SHIPPED | OUTPATIENT
Start: 2024-05-31

## 2024-05-31 RX ORDER — OMEPRAZOLE 40 MG/1
40 CAPSULE, DELAYED RELEASE ORAL DAILY
Qty: 90 CAPSULE | Refills: 1 | Status: SHIPPED | OUTPATIENT
Start: 2024-05-31

## 2024-05-31 RX ORDER — ATORVASTATIN CALCIUM 20 MG/1
20 TABLET, FILM COATED ORAL DAILY
Qty: 90 TABLET | Refills: 1 | Status: SHIPPED | OUTPATIENT
Start: 2024-05-31

## 2024-05-31 RX ORDER — AMLODIPINE BESYLATE 5 MG/1
5 TABLET ORAL DAILY
Qty: 90 TABLET | Refills: 1 | Status: SHIPPED | OUTPATIENT
Start: 2024-05-31

## 2024-06-06 ENCOUNTER — OFFICE VISIT (OUTPATIENT)
Dept: PHYSICAL THERAPY | Facility: REHABILITATION | Age: 89
End: 2024-06-06
Payer: COMMERCIAL
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
## 2024-06-06 DIAGNOSIS — M67.911 ROTATOR CUFF DYSFUNCTION, RIGHT: ICD-10-CM

## 2024-06-06 DIAGNOSIS — M19.019 AC JOINT ARTHROPATHY: ICD-10-CM

## 2024-06-06 DIAGNOSIS — M25.60 LIMITED JOINT RANGE OF MOTION (ROM): ICD-10-CM

## 2024-06-06 DIAGNOSIS — M79.601 RIGHT ARM PAIN: Primary | ICD-10-CM

## 2024-06-06 PROCEDURE — 97140 MANUAL THERAPY 1/> REGIONS: CPT

## 2024-06-06 PROCEDURE — 97110 THERAPEUTIC EXERCISES: CPT

## 2024-06-06 PROCEDURE — 97112 NEUROMUSCULAR REEDUCATION: CPT

## 2024-06-06 NOTE — PROGRESS NOTES
Daily Note     Today's date: 2024  Patient name: Fallon Proctor  : 1935  MRN: [id number]  Referring provider: Leigh Acuna*  Dx:   Encounter Diagnosis     ICD-10-CM    1. Right arm pain  M79.601       2. AC joint arthropathy  M19.019       3. Rotator cuff dysfunction, right  M67.911       4. Fall, initial encounter  W19.XXXA       5. Limited joint range of motion (ROM)  M25.60                      Subjective: Pt reports her arm is sore upon arrival to therapy, further stating she was cooking and packing a suitcase-reaching up for hangers. She attempted HEP at home.    Objective: See treatment diary below    Assessment: Pt with good tolerance to treatment and addition of manual techniques with improved GH mobility afterward. Reviewed exercises and added pulleys and supine wand press ups with good tolerance. Moderate manual and verbal cues for relaxation and avoiding UT compensation during manuals and table slides with fair carryover. Educated pt on DOMS and continued use of ice at home. Advised her to complete HEP while away on vacation next week. Pt verbalizes understanding of all. Will continue to progress as able. Pt would benefit from continued skilled PT to improve current deficits and maximize function.    Plan: Continue per plan of care.  Progress treatment as tolerated.       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    Gastroesophageal reflux disease without esophagitis    Age-related cataract of both eyes    CKD (chronic kidney disease) stage 3, GFR 30-59 ml/min ([facility])    Hyperglycemia    Insomnia    History of basal cell carcinoma    Pain in both feet    Age-related osteoporosis without current pathological fracture    Closed fracture of bone of right foot    Renal angiomyolipoma    Prediabetes    Acute pain of right shoulder    Syncope     "Hyperkalemia    Anxiety    Multiple fractures of ribs, right side, initial encounter for closed fracture       Daily Treatment Diary      Assessment  5/29 6/6                   Eval/Reval  MD                     FOTO  50/61       **         **   Manuals    R GH Mobs    SE                    R Shoulder PROM  SE                        Prescribed POC    Pt Education  MD                      HEP Issued/Updated  MD Starreys    5\"x3 min                    Table Slides: Flexion & Scaption  5\"  1x10   5\" 1x10                    Table ER Stretch  5\"  1x10   5\"  1x10                    Scap Retractions  5\"  1x10   5\"  1x10                    Flexion Wall Slides                        Supine Wand Press    5\"  1x10                    S/L ER AROM to Neutral                                                                                               Modalities    CP R Shoulder    10'                                QR Code:    [facility] HEP:  Access Code: [email or web address]  URL: [email or web address]  Date: 05/29/2024  Prepared by: Kathleen Mcfarland    Exercises  - Seated Shoulder Flexion Towel Slide at Table Top  - 1 x daily - 10 reps - 5 hold  - Seated Shoulder Scaption Slide at Table Top with Forearm in Neutral  - 1 x daily - 10 reps - 5 hold  - Seated Shoulder External Rotation PROM on Table  - 1 x daily - 10 reps - 5 hold  - Seated Scapular Retraction  - 1 x daily - 10 reps - 5 hold    Patient Education  - Ice  - Managing Shoulder Pain  - Shoulder Care After Surgery or Trauma       "

## 2024-06-17 ENCOUNTER — OFFICE VISIT (OUTPATIENT)
Dept: INTERNAL MEDICINE CLINIC | Facility: OTHER | Age: 89
End: 2024-06-17
Payer: COMMERCIAL

## 2024-06-17 VITALS
HEIGHT: 63 IN | DIASTOLIC BLOOD PRESSURE: 60 MMHG | SYSTOLIC BLOOD PRESSURE: 138 MMHG | HEART RATE: 69 BPM | WEIGHT: 122.6 LBS | BODY MASS INDEX: 21.72 KG/M2 | TEMPERATURE: 98.1 F | OXYGEN SATURATION: 97 %

## 2024-06-17 DIAGNOSIS — N30.01 ACUTE CYSTITIS WITH HEMATURIA: Primary | ICD-10-CM

## 2024-06-17 LAB
SL AMB  POCT GLUCOSE, UA: NEGATIVE
SL AMB LEUKOCYTE ESTERASE,UA: NORMAL
SL AMB POCT BILIRUBIN,UA: NEGATIVE
SL AMB POCT BLOOD,UA: NORMAL
SL AMB POCT CLARITY,UA: CLEAR
SL AMB POCT COLOR,UA: YELLOW
SL AMB POCT KETONES,UA: NORMAL
SL AMB POCT NITRITE,UA: NEGATIVE
SL AMB POCT PH,UA: 5.5
SL AMB POCT SPECIFIC GRAVITY,UA: 1.03
SL AMB POCT URINE PROTEIN: NORMAL
SL AMB POCT UROBILINOGEN: NORMAL

## 2024-06-17 PROCEDURE — 87186 SC STD MICRODIL/AGAR DIL: CPT

## 2024-06-17 PROCEDURE — 81003 URINALYSIS AUTO W/O SCOPE: CPT

## 2024-06-17 PROCEDURE — 87086 URINE CULTURE/COLONY COUNT: CPT

## 2024-06-17 PROCEDURE — 87077 CULTURE AEROBIC IDENTIFY: CPT

## 2024-06-17 PROCEDURE — G2211 COMPLEX E/M VISIT ADD ON: HCPCS

## 2024-06-17 PROCEDURE — 99213 OFFICE O/P EST LOW 20 MIN: CPT

## 2024-06-17 RX ORDER — CEPHALEXIN 500 MG/1
500 CAPSULE ORAL 2 TIMES DAILY
Qty: 14 CAPSULE | Refills: 0 | Status: SHIPPED | OUTPATIENT
Start: 2024-06-17 | End: 2024-06-24

## 2024-06-17 NOTE — ASSESSMENT & PLAN NOTE
Urine dip + large leukocytes, negative for nitrites  Will treat empirically with Keflex 500 mg twice daily x 7 days  Advised patient to increase hydration, maintain adequate hygiene  Will send urine for culture and sensitivity.  Will contact patient with results once available  Red flag symptoms discussed, patient advised to follow-up if symptoms worsen or do not improve

## 2024-06-17 NOTE — PROGRESS NOTES
Assessment/Plan:    1. Acute cystitis with hematuria  Assessment & Plan:  Urine dip + large leukocytes, negative for nitrites  Will treat empirically with Keflex 500 mg twice daily x 7 days  Advised patient to increase hydration, maintain adequate hygiene  Will send urine for culture and sensitivity.  Will contact patient with results once available  Red flag symptoms discussed, patient advised to follow-up if symptoms worsen or do not improve  Orders:  -     cephalexin (KEFLEX) 500 mg capsule; Take 1 capsule (500 mg total) by mouth 2 (two) times a day for 7 days  -     UA w Reflex to Microscopic w Reflex to Culture  -     POCT urine dip auto non-scope            M*Modal software was used to dictate this note.  It may contain errors with dictating incorrect words or incorrect spelling. Please contact the provider directly with any questions.    Subjective:      Patient ID: Fallon Proctor is a 88 y.o. female.    Patient is an 88-year-old female presenting to the office for UTI symptoms x 3 days  She is noting burning with urination, increased frequency, especially at night  She has not tried any medications for symptoms  Does have a history of UTIs, last UTI over a year ago    Urinary Tract Infection   This is a new problem. The current episode started in the past 7 days. The problem occurs every urination. The problem has been unchanged. The quality of the pain is described as burning. The patient is experiencing no pain. There has been no fever. Associated symptoms include frequency. Pertinent negatives include no chills, flank pain, hematuria, nausea, urgency or vomiting. Her past medical history is significant for recurrent UTIs.       The following portions of the patient's history were reviewed and updated as appropriate: allergies, current medications, past family history, past medical history, past social history, past surgical history, and problem list.    Review of Systems   Constitutional:  Negative for  chills, fatigue and fever.   Respiratory:  Negative for cough, chest tightness, shortness of breath and wheezing.    Cardiovascular:  Negative for chest pain and palpitations.   Gastrointestinal:  Negative for abdominal pain, constipation, diarrhea, nausea and vomiting.   Genitourinary:  Positive for dysuria and frequency. Negative for decreased urine volume, flank pain, hematuria, pelvic pain and urgency.   Neurological:  Negative for dizziness, light-headedness and headaches.         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)     Last Assessed:3/18/2014         Current Outpatient Medications:     acetaminophen (TYLENOL) 500 mg tablet, Take 1 tablet (500 mg total) by mouth every 4 (four) hours as needed (knee and back pain), Disp: 90 tablet, Rfl: 1    acetaminophen (TYLENOL) 500 mg tablet, Take 1 tablet (500 mg total) by mouth every 6 (six) hours as needed for mild pain or moderate pain, Disp: 30 tablet, Rfl: 0    amLODIPine (NORVASC) 5 mg tablet, Take 1 tablet (5 mg total) by mouth daily, Disp: 90 tablet, Rfl: 1    atorvastatin (LIPITOR) 20 mg tablet, Take 1 tablet (20 mg total) by mouth daily, Disp: 90 tablet, Rfl: 1    cephalexin (KEFLEX) 500 mg capsule, Take 1 capsule (500 mg total) by mouth 2 (two) times a day for 7 days, Disp: 14 capsule, Rfl: 0    Cholecalciferol (VITAMIN D3 PO), Take 1 capsule by mouth daily 2000, Disp: , Rfl:     Cranberry-Vitamin C-Probiotic (AZO CRANBERRY PO), Take by mouth as needed, Disp: , Rfl:     lidocaine (LIDODERM) 5 %, Apply 1 patch topically over 12 hours every 24 hours Remove &  "Discard patch within 12 hours or as directed by MD (Patient taking differently: Apply 1 patch topically if needed Remove & Discard patch within 12 hours or as directed by MD), Disp: 15 patch, Rfl: 0    LORazepam (ATIVAN) 0.5 mg tablet, Take 1 tablet (0.5 mg total) by mouth as needed (help sleep), Disp: 30 tablet, Rfl: 0    omeprazole (PriLOSEC) 40 MG capsule, Take 1 capsule (40 mg total) by mouth daily, Disp: 90 capsule, Rfl: 1    sertraline (ZOLOFT) 25 mg tablet, Take 1 tablet (25 mg total) by mouth daily, Disp: 30 tablet, Rfl: 2    Current Facility-Administered Medications:     denosumab (PROLIA) subcutaneous injection 60 mg, 60 mg, Subcutaneous, Q6 Months, Wali Byrd MD, 60 mg at 09/18/23 1624    Allergies   Allergen Reactions    Flexeril [Cyclobenzaprine] Syncope    Daypro [Oxaprozin]      Unknown allergic reaction    Minocycline Other (See Comments)     Loss of taste       Social History   Past Surgical History:   Procedure Laterality Date    APPENDECTOMY      CATARACT EXTRACTION, BILATERAL      HYSTERECTOMY      INCISIONAL BREAST BIOPSY       Family History   Problem Relation Age of Onset    Diabetes Mother     Varicose Veins Mother     Diabetes Brother        Objective:  /60 (BP Location: Left arm, Patient Position: Sitting, Cuff Size: Standard)   Pulse 69   Temp 98.1 °F (36.7 °C) (Temporal)   Ht 5' 3\" (1.6 m)   Wt 55.6 kg (122 lb 9.6 oz) Comment: with shoes on  SpO2 97% Comment: ra  BMI 21.72 kg/m²      Physical Exam  Vitals and nursing note reviewed.   Constitutional:       General: She is not in acute distress.     Appearance: Normal appearance. She is not ill-appearing.   HENT:      Head: Normocephalic and atraumatic.      Right Ear: External ear normal.      Left Ear: External ear normal.      Nose: Nose normal.      Mouth/Throat:      Mouth: Mucous membranes are moist.      Pharynx: Oropharynx is clear.   Eyes:      General: No scleral icterus.     Conjunctiva/sclera: Conjunctivae " normal.      Pupils: Pupils are equal, round, and reactive to light.   Cardiovascular:      Rate and Rhythm: Normal rate and regular rhythm.      Heart sounds: Normal heart sounds. No murmur heard.     No friction rub. No gallop.   Pulmonary:      Effort: Pulmonary effort is normal. No respiratory distress.      Breath sounds: Normal breath sounds. No wheezing, rhonchi or rales.   Chest:      Chest wall: No tenderness.   Abdominal:      Palpations: Abdomen is soft.      Tenderness: There is no abdominal tenderness. There is no right CVA tenderness, left CVA tenderness, guarding or rebound.      Hernia: No hernia is present.   Musculoskeletal:      Right lower leg: No edema.      Left lower leg: No edema.   Skin:     General: Skin is warm and dry.      Coloration: Skin is not pale.      Findings: No erythema.   Neurological:      General: No focal deficit present.      Mental Status: She is alert and oriented to person, place, and time. Mental status is at baseline.   Psychiatric:         Mood and Affect: Mood normal.         Behavior: Behavior normal.           Disclaimer: This note was generated with voice recognition software.  Phonetic, grammatical, and spelling errors may be present as a result.  Please contact provider with any concerns or questions  a

## 2024-06-18 ENCOUNTER — TELEPHONE (OUTPATIENT)
Age: 89
End: 2024-06-18

## 2024-06-18 ENCOUNTER — APPOINTMENT (OUTPATIENT)
Dept: PHYSICAL THERAPY | Facility: REHABILITATION | Age: 89
End: 2024-06-18
Payer: COMMERCIAL

## 2024-06-18 NOTE — TELEPHONE ENCOUNTER
[facility] lab is calling to inform provider that the urine sample collected for this patient was collected incorrectly  UAUC was collected in wrong container, they only received a gray top tube and they can run a urine culture if provider wants or does a patient need recollection, they cannot do UAUC  Please review and advise  Thank you

## 2024-06-19 NOTE — TELEPHONE ENCOUNTER
Urine collected in wrong tube for UA.     Please see message attached and advise.     Shayla, please follow through.

## 2024-06-20 ENCOUNTER — OFFICE VISIT (OUTPATIENT)
Dept: PHYSICAL THERAPY | Facility: REHABILITATION | Age: 89
End: 2024-06-20
Payer: COMMERCIAL

## 2024-06-20 DIAGNOSIS — M67.911 ROTATOR CUFF DYSFUNCTION, RIGHT: ICD-10-CM

## 2024-06-20 DIAGNOSIS — M25.60 LIMITED JOINT RANGE OF MOTION (ROM): ICD-10-CM

## 2024-06-20 DIAGNOSIS — M19.019 AC JOINT ARTHROPATHY: ICD-10-CM
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
## 2024-06-20 DIAGNOSIS — M79.601 RIGHT ARM PAIN: Primary | ICD-10-CM

## 2024-06-20 PROCEDURE — 97112 NEUROMUSCULAR REEDUCATION: CPT

## 2024-06-20 PROCEDURE — 97110 THERAPEUTIC EXERCISES: CPT

## 2024-06-20 PROCEDURE — 97140 MANUAL THERAPY 1/> REGIONS: CPT

## 2024-06-20 NOTE — PROGRESS NOTES
Daily Note     Today's date: 2024  Patient name: Fallon Proctor  : 1935  MRN: [id number]  Referring provider: Leigh Acuna*  Dx:   Encounter Diagnosis     ICD-10-CM    1. Right arm pain  M79.601       2. AC joint arthropathy  M19.019       3. Rotator cuff dysfunction, right  M67.911       4. Fall, initial encounter  W19.XXXA       5. Limited joint range of motion (ROM)  M25.60           Start Time: 730  Stop Time: 815  Total time in clinic (min): 45 minutes    Subjective: Patient reports that she continues to have right shoulder pain, but uses her right shoulder normally at home.       Objective: See treatment diary below      Assessment: Pt with good tolerance to treatment. Reviewed exercises and added supine wand flexion with good tolerance. Moderate manual and verbal cues for relaxation and avoiding UT compensation during manuals and table slides with fair carryover. Educated pt on DOMS and continued use of ice at home. Advised her to complete HEP while away on vacation next week. Pt verbalizes understanding of all. Will continue to progress as able. Pt would benefit from continued skilled PT to improve current deficits and maximize function.       Plan: Continue per plan of care.      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    Gastroesophageal reflux disease without esophagitis    Age-related cataract of both eyes    CKD (chronic kidney disease) stage 3, GFR 30-59 ml/min ([facility])    Hyperglycemia    Insomnia    History of basal cell carcinoma    Pain in both feet    Age-related osteoporosis without current pathological fracture    Closed fracture of bone of right foot    Renal angiomyolipoma    Prediabetes    Acute pain of right shoulder    Syncope    Hyperkalemia    Anxiety    Multiple fractures of ribs, right side, initial encounter for closed  "fracture       Daily Treatment Diary      Assessment  5/29 6/6 6/20                 Eval/Vanna BHATT                     FOTO  50/61       **         **   Manuals    R GH Mobs    SE                    R Shoulder PROM  SE JG                       Prescribed POC    Pt Education  MD                      HEP Issued/Updated  MD Hernandez    5\"x3 min  5\"x4'                  Table Slides: Flexion & Scaption  5\"  1x10   5\" 1x10  5\" 1x10 ea                  Table ER Stretch  5\"  1x10   5\"  1x10  5\"x10                  Scap Retractions  5\"  1x10   5\"  1x10  5\"  2x10                  Flexion Wall Slides      5\"x10                   Supine Wand Press    5\"  1x10  5\"  1x10    And flex x10                  S/L ER AROM to Neutral      5\" x10                                                                                         Modalities    CP R Shoulder    10'  6'                              QR Code:    [facility] HEP:  Access Code: [email or web address]  URL: [email or web address]  Date: 05/29/2024  Prepared by: Kathleen Mcfarland    Exercises  - Seated Shoulder Flexion Towel Slide at Table Top  - 1 x daily - 10 reps - 5 hold  - Seated Shoulder Scaption Slide at Table Top with Forearm in Neutral  - 1 x daily - 10 reps - 5 hold  - Seated Shoulder External Rotation PROM on Table  - 1 x daily - 10 reps - 5 hold  - Seated Scapular Retraction  - 1 x daily - 10 reps - 5 hold    Patient Education  - Ice  - Managing Shoulder Pain  - Shoulder Care After Surgery or Trauma         "

## 2024-06-21 LAB
BACTERIA UR CULT: ABNORMAL
BACTERIA UR CULT: ABNORMAL

## 2024-06-26 ENCOUNTER — OFFICE VISIT (OUTPATIENT)
Dept: OBGYN CLINIC | Facility: CLINIC | Age: 89
End: 2024-06-26
Payer: COMMERCIAL

## 2024-06-26 VITALS
BODY MASS INDEX: 21.62 KG/M2 | DIASTOLIC BLOOD PRESSURE: 60 MMHG | HEIGHT: 63 IN | WEIGHT: 122 LBS | SYSTOLIC BLOOD PRESSURE: 130 MMHG

## 2024-06-26 DIAGNOSIS — M79.601 RIGHT ARM PAIN: ICD-10-CM

## 2024-06-26 DIAGNOSIS — M19.019 AC JOINT ARTHROPATHY: ICD-10-CM
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
## 2024-06-26 DIAGNOSIS — M67.911 ROTATOR CUFF DYSFUNCTION, RIGHT: ICD-10-CM

## 2024-06-26 PROCEDURE — 99213 OFFICE O/P EST LOW 20 MIN: CPT | Performed by: FAMILY MEDICINE

## 2024-06-26 NOTE — PROGRESS NOTES
Assessment:     1. Fall, subsequent encounter        2. Right arm pain        3. AC joint arthropathy        4. Rotator cuff dysfunction, right          No orders of the defined types were placed in this encounter.       Impression:   Right shoulder pain likely secondary to AC joint arthropathy and rotator cuff dysfunction.    Date of injury: 05/04/2024  Mechanism of injury: Mechanical trip and fall  Follow-up from injury : 7 weeks and 4 days      Conservative Management   We discussed different treatment options:  Previously reviewed/discussed  Reviewed ED documentation completed on 05/04/2024.  Treatment plan consisted of multiple x-rays as well as referral to Ortho/sports medicine.  Ice or Heat Therapy as needed 1-2 times daily for 10-20 minutes. As tolerated.   Over the counter Tylenol and/or NSAIDs  as needed based off your Past Medical Hx. Please follow product label for dosing and maximum limits.    Trial of over the counter Topical Analgesics such as Lidocaine cream or Voltaren Gel, as tolerated. If skin becomes irritated, discontinue use.   Formal Handout provided on General Information of shoulder exercises.  Please range joint through gentle range of motion as tolerated.   Initiate Formal Physical Therapy at any preferred location.  Prescription provided for home PT.  Today's visit/discussion  Reviewed formal physical therapy documentation completed on 06/20/2024.  Recommended completion of formal physical therapy into home exercise program.  Handout also provided for shoulder exercises.  Reviewed concern with weakness of rotator cuff.  Recommended MRI if they wanted to pursue potential surgical management.  Patient and family member declined at this time.        Imaging   Reviewed prior xrays obtain in Urgent or Emergency Department. These were reviewed in office with patient today.   05/04/2024.  ED completed CT cervical spine, CT chest, CT head, x-ray tib-fib on the right, x-ray femur on the  right.  05/04/2024 right shoulder x-ray: No acute osseous abnormality  05/04/2024 right humerus x-ray: No acute osseous abnormality     Procedure  No Injection performed today. May consider future corticosteroid injection depending on clinical exam/imaging.    Shared decision making, patient agreeable to plan.      Return for Follow up as needed or if symptoms do NOT improve.    HPI:   Fallon Proctor is a 88 y.o. female  who presents for evaluation of   Chief Complaint   Patient presents with    Right Arm - Pain, Clicking, Follow-up     Pain radiates from neck to elbow.        Today's visit.  Denies any new trauma.  Compliant with formal physical therapy.  Feels about 90+ percent improved.  Had significant improvement in range of motion.      Occupation: Retired   Injury Related: Yes, Date of Injury: 05/04/2024     Onset/Mechanism: mechanical trip and fall landing on right shoulder.  Location: Mid humerus.  Severity: Current severity: 7/10.  Pain described as: Pressure.   Radiation: Denies.  Provocative: Shoulder abduction, shoulder flexion, shoulder extension.   Associated symptoms:  Limited range of motion.  Numbness in the fingers.  However this was present prior to the fall.     Denies any hx of fracture of affected limb.   Denies any surgical history of affected limb.       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)     Last Assessed:3/18/2014     Past Surgical History:   Procedure Laterality Date     APPENDECTOMY      CATARACT EXTRACTION, BILATERAL      HYSTERECTOMY      INCISIONAL BREAST BIOPSY       Family History   Problem Relation Age of Onset    Diabetes Mother     Varicose Veins Mother     Diabetes Brother        Social History     Substance and Sexual Activity   Alcohol Use Yes    Comment: socially     Social History     Substance and Sexual Activity   Drug Use No     Social History     Tobacco Use   Smoking Status Never   Smokeless Tobacco Never       Social Determinants of Health     Tobacco Use: Low Risk  (6/26/2024)    Patient History     Smoking Tobacco Use: Never     Smokeless Tobacco Use: Never     Passive Exposure: Not on file   Alcohol Use: Not At Risk (5/24/2021)    AUDIT-C     Frequency of Alcohol Consumption: 2-4 times a month     Average Number of Drinks: 1 or 2     Frequency of Binge Drinking: Never   Financial Resource Strain: Medium Risk (6/26/2023)    Overall Financial Resource Strain (CARDIA)     Difficulty of Paying Living Expenses: Somewhat hard   Food Insecurity: Not on file   Transportation Needs: No Transportation Needs (6/26/2023)    PRAPARE - Transportation     Lack of Transportation (Medical): No     Lack of Transportation (Non-Medical): No   Physical Activity: Not on file   Stress: Not on file   Social Connections: Not on file   Intimate Partner Violence: Not on file   Depression: Not at risk (5/7/2024)    PHQ-2     PHQ-2 Score: 0   Housing Stability: Not on file   Utilities: Not on file   Health Literacy: Not on file        Allergies   Allergen Reactions    Flexeril [Cyclobenzaprine] Syncope    Daypro [Oxaprozin]      Unknown allergic reaction    Minocycline Other (See Comments)     Loss of taste       Review of Systems      Review of Systems     Review of Systems   Constitutional: Negative for chills and fever.   HENT: Negative for drooling and sneezing.    Eyes: Negative for redness.   Respiratory: Negative for cough and wheezing.    Gastrointestinal: Negative for vomiting.  "  Psychiatric/Behavioral: Negative for behavioral problems. The patient is not nervous/anxious.      All other systems negative.   Physical Exam   Physical Exam    Vitals and nursing note reviewed.  Constitutional:   Appearance. Normal Appearance.  /60 (BP Location: Right arm, Patient Position: Sitting, Cuff Size: Standard)   Ht 5' 3\" (1.6 m)   Wt 55.3 kg (122 lb)   BMI 21.61 kg/m²     Body mass index is 21.61 kg/m².   HENT:  Head: Atraumatic.  Nose: Nose normal  Eyes: Conjunctiva/sclera: Conjunctivae normal.  Cardiovascular:   Rate and Rhythm: Bilateral equal distal pulses  Pulmonary:   Effort: Pulmonary effort is normal  Skin:   General: Skin is warm and dry.  Neurological:   General: No focal deficit present.  Mental Status: Alert and oriented to person, place, and time.   Psychiatric:   Mood and Affect: mood normal.  Behavior: Behavior normal     Musculoskeletal Exam     Ortho Exam   Right shoulder:      INSPECTION:  Gross deformity Increased warmth   Negative         RANGE OF MOTION:  C-Spine Flexion C-Spine Extension C-Spine Sidebending C-Spine Rotation    intact Limited Limited intact      Internal rotation in 90 degrees Abduction External rotation in 90 degrees Abduction Internal rotation in Adduction External rotation in Adduction       Lumbar intact       Forward Flexion Abduction   Intact, minimal limited endrange Intact, minimal limited endrange      STRENGTH:        Okay Sign Finger abduction Thumb extension   Intact, bilaterally Intact, bilaterally Intact, bilaterally         ROTATOR CUFF:  Rotator cuff tear  Supraspinatus  Infraspinatus  Subscapularis    (Drop-Arm) (Empty can) (External rotation against resistance) (Belly press)   negative Discomfort and weakness  Discomfort and weakness Discomfort and weakness      IMPINGEMENT:  Neer's Vizcarra-Christoph     Negative      BICEPS TENDINOPATHY:  Resisted forward flexion  Resisted supination   (Speed's) (Yergason's):    Minimal discomfort N/a     " "  AC JOINT:  Forced cross body adduction (Scarf cross-arm) Job's AC compression   Mild discomfort not reproducing chief complaint N/a       LABRUM:  Camargo's: Labral Crank Test:      N/a       APPREHENSION  Apprehension Rigoberto's Relocation Maneuver:    N/A N/A      Special test:  Spurlings    N/A         Distal Sensation  Radial Pulse   Intact Bilaterally  Present and Equal Bilaterally                  Procedures       Portions of the record may have been created with voice recognition software. Occasional wrong word or \"sound alike\" substitutions may have occurred due to the inherent limitations of voice recognition software. Please review the chart carefully and recognize, using context, where substitutions/typographical errors may have occurred.   "

## 2024-06-26 NOTE — PATIENT INSTRUCTIONS
"Patient Education     Exercise Band Exercises for the Shoulder   About this topic   Using an exercise band is one way to strengthen your muscles. You can use an exercise band at home, work, or when you travel. You can buy one at a sporting goods store or online and most cost less than 15 dollars. The color of the band lets you know how much tension it will give your muscles. The colors may differ slightly with each company that makes them. Ask your doctor or therapist what color band you should use.  General   Before starting with a program, ask your doctor if you are healthy enough to do these exercises. Your doctor may have you work with a  or physical therapist to make a safe exercise program to meet your needs.  Strengthening Exercises   Strengthening exercises keep your muscles firm and strong. Start by repeating each exercise 2 to 3 times. Work up to doing each exercise 10 times. Try to do the exercises 2 to 3 times each day. Do all exercises slowly.  Shoulder exercises ? Tie a knot in an exercise band. Secure the band in a door by shutting it in around waist level. Be sure to avoid the area next to the door handle as the door mechanism could tear the band. It is also a good idea to lock the door so no one accidently opens the door while you are working out. Wrap the band around one hand for a good . Stand away from the door enough to have slight tension in the band. As the exercises get easier, you may need to change to a heavier band. Moving closer to, or further away from, the point where the band is tied down will decrease or increase the tension in the band.  Internal rotations ? Face sideways with the arm holding the band closest to the door. Bend the elbow to 90 degrees or in an \"L\" position. Keeping your elbow by your side and bent, pull the band across your body. Bring it back to the starting position. Repeat with the other arm.  External rotations ? Face sideways with the arm holding the " "band farthest from the door. Bend the elbow to 90 degrees or in an \"L\" position. Keeping your elbow by your side and bent, pull the band away from your body. Bring it back to the starting position. Repeat with the other arm.  Adductions ? Face sideways with the arm holding the band closest to the door. Keep your elbow straight and pull the band across your body. Bring it back to the starting position. Repeat with the other arm.  Abductions ? Face sideways with the arm holding the band farthest from the door. Be sure that your thumb is facing upwards. Keep your elbow straight and pull the band out to the side and away from your body. Go up to shoulder level. Bring it back to the starting position. Repeat with the other arm.  Flexions ? Face away from the door. Keeping your elbow straight, pull the band straight out in front of you. Bring it back to the starting position. Repeat with the other arm.  Extensions ? Face towards the door. Keeping your elbow straight, pull the band straight backwards. Bring it back to the starting position. Repeat with the other arm.     What will the results be?   Stronger muscles  More toned arms  More shoulder range of motion  Greater ease doing arm activities  Helpful tips   Stay active and work out to keep your muscles strong and flexible.  Keep a healthy weight to avoid putting too much stress on your joints. Eat a healthy diet to keep your muscles healthy.  Be sure you do not hold your breath when exercising. This can raise your blood pressure. If you tend to hold your breath, try counting out loud when exercising. Breathe out when you are pulling on the band. Breathe in when you return the band to the starting position. If any exercise bothers you, stop right away.  Try walking and swinging your arms at an easy pace for a few minutes to warm up your muscles. Do this again after exercising.  Exercise may be slightly uncomfortable, but you should not have sharp pains. If you do get " sharp pains, stop what you are doing. If the sharp pains continue, call your doctor.  Be sure to check the rubber tubing or band for signs of tears or weakness before using it.  Last Reviewed Date   2021-06-24  Consumer Information Use and Disclaimer   This generalized information is a limited summary of diagnosis, treatment, and/or medication information. It is not meant to be comprehensive and should be used as a tool to help the user understand and/or assess potential diagnostic and treatment options. It does NOT include all information about conditions, treatments, medications, side effects, or risks that may apply to a specific patient. It is not intended to be medical advice or a substitute for the medical advice, diagnosis, or treatment of a health care provider based on the health care provider's examination and assessment of a patient’s specific and unique circumstances. Patients must speak with a health care provider for complete information about their health, medical questions, and treatment options, including any risks or benefits regarding use of medications. This information does not endorse any treatments or medications as safe, effective, or approved for treating a specific patient. [employer] and its affiliates disclaim any warranty or liability relating to this information or the use thereof. The use of this information is governed by the Terms of Use, available at https://www.wolterskluwer.com/en/know/clinical-effectiveness-terms   Copyright   Copyright © 2024 [employer] and its affiliates and/or licensors. All rights reserved.

## 2024-07-08 ENCOUNTER — OFFICE VISIT (OUTPATIENT)
Dept: INTERNAL MEDICINE CLINIC | Facility: OTHER | Age: 89
End: 2024-07-08
Payer: COMMERCIAL

## 2024-07-08 VITALS
WEIGHT: 122.8 LBS | HEIGHT: 63 IN | OXYGEN SATURATION: 97 % | DIASTOLIC BLOOD PRESSURE: 54 MMHG | SYSTOLIC BLOOD PRESSURE: 118 MMHG | RESPIRATION RATE: 18 BRPM | HEART RATE: 69 BPM | TEMPERATURE: 98.8 F | BODY MASS INDEX: 21.76 KG/M2

## 2024-07-08 DIAGNOSIS — Z00.00 MEDICARE ANNUAL WELLNESS VISIT, SUBSEQUENT: Primary | ICD-10-CM

## 2024-07-08 DIAGNOSIS — M81.0 AGE-RELATED OSTEOPOROSIS WITHOUT CURRENT PATHOLOGICAL FRACTURE: ICD-10-CM

## 2024-07-08 DIAGNOSIS — R73.03 PREDIABETES: ICD-10-CM

## 2024-07-08 DIAGNOSIS — N18.31 STAGE 3A CHRONIC KIDNEY DISEASE (HCC): ICD-10-CM

## 2024-07-08 DIAGNOSIS — E78.2 MIXED HYPERLIPIDEMIA: ICD-10-CM

## 2024-07-08 DIAGNOSIS — F41.9 ANXIETY: ICD-10-CM

## 2024-07-08 DIAGNOSIS — I10 ESSENTIAL HYPERTENSION: ICD-10-CM

## 2024-07-08 DIAGNOSIS — I83.93 ASYMPTOMATIC VARICOSE VEINS OF BOTH LOWER EXTREMITIES: ICD-10-CM

## 2024-07-08 DIAGNOSIS — K21.9 GASTROESOPHAGEAL REFLUX DISEASE WITHOUT ESOPHAGITIS: ICD-10-CM

## 2024-07-08 DIAGNOSIS — E55.9 VITAMIN D DEFICIENCY: ICD-10-CM

## 2024-07-08 PROCEDURE — G0439 PPPS, SUBSEQ VISIT: HCPCS | Performed by: INTERNAL MEDICINE

## 2024-07-08 PROCEDURE — 99214 OFFICE O/P EST MOD 30 MIN: CPT | Performed by: INTERNAL MEDICINE

## 2024-07-08 RX ORDER — OMEPRAZOLE 20 MG/1
20 CAPSULE, DELAYED RELEASE ORAL DAILY
Qty: 90 CAPSULE | Refills: 1 | Status: SHIPPED | OUTPATIENT
Start: 2024-07-08

## 2024-07-08 NOTE — ASSESSMENT & PLAN NOTE
As per daughter she is little bit better than last time since started sertraline 25 mg.  No side effect noted.  Will increase sertraline 50 mg daily

## 2024-07-08 NOTE — PATIENT INSTRUCTIONS
Medicare Preventive Visit Patient Instructions  Thank you for completing your Welcome to Medicare Visit or Medicare Annual Wellness Visit today. Your next wellness visit will be due in one year (7/9/2025).  The screening/preventive services that you may require over the next 5-10 years are detailed below. Some tests may not apply to you based off risk factors and/or age. Screening tests ordered at today's visit but not completed yet may show as past due. Also, please note that scanned in results may not display below.  Preventive Screenings:  Service Recommendations Previous Testing/Comments   Colorectal Cancer Screening  * Colonoscopy    * Fecal Occult Blood Test (FOBT)/Fecal Immunochemical Test (FIT)  * Fecal DNA/Cologuard Test  * Flexible Sigmoidoscopy Age: 45-75 years old   Colonoscopy: every 10 years (may be performed more frequently if at higher risk)  OR  FOBT/FIT: every 1 year  OR  Cologuard: every 3 years  OR  Sigmoidoscopy: every 5 years  Screening may be recommended earlier than age 45 if at higher risk for colorectal cancer. Also, an individualized decision between you and your healthcare provider will decide whether screening between the ages of 76-85 would be appropriate. Colonoscopy: 01/06/2007  FOBT/FIT: Not on file  Cologuard: Not on file  Sigmoidoscopy: Not on file    Screening Not Indicated     Breast Cancer Screening Age: 40+ years old  Frequency: every 1-2 years  Not required if history of left and right mastectomy Mammogram: 02/01/2022        Cervical Cancer Screening Between the ages of 21-29, pap smear recommended once every 3 years.   Between the ages of 30-65, can perform pap smear with HPV co-testing every 5 years.   Recommendations may differ for women with a history of total hysterectomy, cervical cancer, or abnormal pap smears in past. Pap Smear: Not on file    Screening Not Indicated   Hepatitis C Screening Once for adults born between 1945 and 1965  More frequently in patients at high  risk for Hepatitis C Hep C Antibody: Not on file        Diabetes Screening 1-2 times per year if you're at risk for diabetes or have pre-diabetes Fasting glucose: 103 mg/dL (8/2/2021)  A1C: 5.9 % (3/22/2024)  Screening Current   Cholesterol Screening Once every 5 years if you don't have a lipid disorder. May order more often based on risk factors. Lipid panel: 03/22/2024    Screening Not Indicated  History Lipid Disorder     Other Preventive Screenings Covered by Medicare:  Abdominal Aortic Aneurysm (AAA) Screening: covered once if your at risk. You're considered to be at risk if you have a family history of AAA.  Lung Cancer Screening: covers low dose CT scan once per year if you meet all of the following conditions: (1) Age 55-77; (2) No signs or symptoms of lung cancer; (3) Current smoker or have quit smoking within the last 15 years; (4) You have a tobacco smoking history of at least 20 pack years (packs per day multiplied by number of years you smoked); (5) You get a written order from a healthcare provider.  Glaucoma Screening: covered annually if you're considered high risk: (1) You have diabetes OR (2) Family history of glaucoma OR (3)  aged 50 and older OR (4)  American aged 65 and older  Osteoporosis Screening: covered every 2 years if you meet one of the following conditions: (1) You're estrogen deficient and at risk for osteoporosis based off medical history and other findings; (2) Have a vertebral abnormality; (3) On glucocorticoid therapy for more than 3 months; (4) Have primary hyperparathyroidism; (5) On osteoporosis medications and need to assess response to drug therapy.   Last bone density test (DXA Scan): 04/13/2023.  HIV Screening: covered annually if you're between the age of 15-65. Also covered annually if you are younger than 15 and older than 65 with risk factors for HIV infection. For pregnant patients, it is covered up to 3 times per  pregnancy.    Immunizations:  Immunization Recommendations   Influenza Vaccine Annual influenza vaccination during flu season is recommended for all persons aged >= 6 months who do not have contraindications   Pneumococcal Vaccine   * Pneumococcal conjugate vaccine = PCV13 (Prevnar 13), PCV15 (Vaxneuvance), PCV20 (Prevnar 20)  * Pneumococcal polysaccharide vaccine = PPSV23 (Pneumovax) Adults 19-65 yo with certain risk factors or if 65+ yo  If never received any pneumonia vaccine: recommend Prevnar 20 (PCV20)  Give PCV20 if previously received 1 dose of PCV13 or PPSV23   Hepatitis B Vaccine 3 dose series if at intermediate or high risk (ex: diabetes, end stage renal disease, liver disease)   Respiratory syncytial virus (RSV) Vaccine - COVERED BY MEDICARE PART D  * RSVPreF3 (Arexvy) CDC recommends that adults 60 years of age and older may receive a single dose of RSV vaccine using shared clinical decision-making (SCDM)   Tetanus (Td) Vaccine - COST NOT COVERED BY MEDICARE PART B Following completion of primary series, a booster dose should be given every 10 years to maintain immunity against tetanus. Td may also be given as tetanus wound prophylaxis.   Tdap Vaccine - COST NOT COVERED BY MEDICARE PART B Recommended at least once for all adults. For pregnant patients, recommended with each pregnancy.   Shingles Vaccine (Shingrix) - COST NOT COVERED BY MEDICARE PART B  2 shot series recommended in those 19 years and older who have or will have weakened immune systems or those 50 years and older     Health Maintenance Due:  There are no preventive care reminders to display for this patient.  Immunizations Due:      Topic Date Due   • Influenza Vaccine (1) 09/01/2024     Advance Directives   What are advance directives?  Advance directives are legal documents that state your wishes and plans for medical care. These plans are made ahead of time in case you lose your ability to make decisions for yourself. Advance directives  can apply to any medical decision, such as the treatments you want, and if you want to donate organs.   What are the types of advance directives?  There are many types of advance directives, and each state has rules about how to use them. You may choose a combination of any of the following:  Living will:  This is a written record of the treatment you want. You can also choose which treatments you do not want, which to limit, and which to stop at a certain time. This includes surgery, medicine, IV fluid, and tube feedings.   Durable power of  for healthcare (DPAHC):  This is a written record that states who you want to make healthcare choices for you when you are unable to make them for yourself. This person, called a proxy, is usually a family member or a friend. You may choose more than 1 proxy.  Do not resuscitate (DNR) order:  A DNR order is used in case your heart stops beating or you stop breathing. It is a request not to have certain forms of treatment, such as CPR. A DNR order may be included in other types of advance directives.  Medical directive:  This covers the care that you want if you are in a coma, near death, or unable to make decisions for yourself. You can list the treatments you want for each condition. Treatment may include pain medicine, surgery, blood transfusions, dialysis, IV or tube feedings, and a ventilator (breathing machine).  Values history:  This document has questions about your views, beliefs, and how you feel and think about life. This information can help others choose the care that you would choose.  Why are advance directives important?  An advance directive helps you control your care. Although spoken wishes may be used, it is better to have your wishes written down. Spoken wishes can be misunderstood, or not followed. Treatments may be given even if you do not want them. An advance directive may make it easier for your family to make difficult choices about your care.    Fall Prevention    Fall prevention  includes ways to make your home and other areas safer. It also includes ways you can move more carefully to prevent a fall. Health conditions that cause changes in your blood pressure, vision, or muscle strength and coordination may increase your risk for falls. Medicines may also increase your risk for falls if they make you dizzy, weak, or sleepy.   Fall prevention tips:   Stand or sit up slowly.    Use assistive devices as directed.    Wear shoes that fit well and have soles that .    Wear a personal alarm.    Stay active.    Manage your medical conditions.    Home Safety Tips:  Add items to prevent falls in the bathroom.    Keep paths clear.    Install bright lights in your home.    Keep items you use often on shelves within reach.    Paint or place reflective tape on the edges of your stairs.       © Copyright [employer] 2018 Information is for End User's use only and may not be sold, redistributed or otherwise used for commercial purposes. All illustrations and images included in CareNotes® are the copyrighted property of [employer] or [employer]

## 2024-07-08 NOTE — PROGRESS NOTES
Ambulatory Visit  Name: Fallon Proctor      : 1935      MRN: [id number]  Encounter Provider: Wali Byrd MD  Encounter Date: 2024   Encounter department: [facility]    Assessment & Plan   1. Medicare annual wellness visit, subsequent  2. 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 Essential hypertension  Assessment & Plan:  Blood pressure stable on present regimen.  Continue with low-salt diet  4. Gastroesophageal reflux disease without esophagitis  Assessment & Plan:  Stable.  Will decrease his omeprazole from 40 mg to 20 mg daily.  Orders:  -     omeprazole (PriLOSEC) 20 mg delayed release capsule; Take 1 capsule (20 mg total) by mouth daily  -     CBC; Future  5. Age-related osteoporosis without current pathological fracture  Assessment & Plan:  Continue with present regimen.  6. Stage 3a chronic kidney disease (HCC)  Assessment & Plan:  Lab Results   Component Value Date    EGFR 75 2024    EGFR 81 2023    EGFR 84 2023    CREATININE 0.76 2024    CREATININE 0.72 2023    CREATININE 0.68 2023   Renal function is stable.  Advised for adequate oral hydration and to avoid nephrotoxic meds including over-the-counter NSAIDs.  Will continue to monitor  7. Anxiety  Assessment & Plan:  As per daughter she is little bit better than last time since started sertraline 25 mg.  No side effect noted.  Will increase sertraline 50 mg daily  Orders:  -     sertraline (ZOLOFT) 50 mg tablet; Take 0.5 tablets (25 mg total) by mouth daily  8. Prediabetes  -     Hemoglobin A1C; Future  9. Mixed hyperlipidemia  Assessment & Plan:  Continue with present dose of statin.  Will check lipid profile before next visit.  Continue with low-fat diet  Orders:  -     Lipid Panel with Direct LDL reflex; Future  10. Vitamin D deficiency  Assessment & Plan:  Continue with present dose of  vitamin D3 supplementation      Depression Screening and Follow-up Plan: Patient was screened for depression during today's encounter. They screened negative with a PHQ-2 score of 0.    Falls Plan of Care: balance, strength, and gait training instructions were provided.       Preventive health issues were discussed with patient, and age appropriate screening tests were ordered as noted in patient's After Visit Summary. Personalized health advice and appropriate referrals for health education or preventive services given if needed, as noted in patient's After Visit Summary.    History of Present Illness     Patient is here for follow-up NWB.  No blood work prior to this visit.  Otherwise no new complaints or concerns    Neck Pain   Pertinent negatives include no chest pain, fever, headaches, trouble swallowing or weakness.      Patient Care Team:  Wali Byrd MD as PCP - General  Mary Ellen Gonzales PA-C    Review of Systems   Constitutional:  Negative for fatigue and fever.   HENT:  Negative for congestion, ear discharge, ear pain, postnasal drip, sinus pressure, sore throat, tinnitus and trouble swallowing.    Eyes:  Negative for discharge, itching and visual disturbance.   Respiratory:  Negative for cough and shortness of breath.    Cardiovascular:  Negative for chest pain and palpitations.   Gastrointestinal:  Negative for abdominal pain, diarrhea, nausea and vomiting.   Endocrine: Negative for cold intolerance and polyuria.   Genitourinary:  Negative for difficulty urinating, dysuria and urgency.   Musculoskeletal:  Positive for arthralgias and neck pain.   Skin:  Negative for rash.   Allergic/Immunologic: Negative for environmental allergies.   Neurological:  Negative for dizziness, weakness and headaches.   Psychiatric/Behavioral:  Negative for agitation. The patient is not nervous/anxious.      Medical History Reviewed by provider this encounter:  Tobacco  Allergies  Meds  Problems  Med Hx  Surg Hx  Fam Hx        Annual Wellness Visit Questionnaire   Fallon is here for her Subsequent Wellness visit. Last Medicare Wellness visit information reviewed, patient interviewed and updates made to the record today.      Health Risk Assessment:   Patient rates overall health as very good. Patient feels that their physical health rating is same. Patient is satisfied with their life. Eyesight was rated as same. Hearing was rated as slightly worse. Patient feels that their emotional and mental health rating is same. Patients states they are never, rarely angry. Patient states they are sometimes unusually tired/fatigued. Pain experienced in the last 7 days has been none. Patient states that she has experienced no weight loss or gain in last 6 months.     Depression Screening:   PHQ-2 Score: 0      Fall Risk Screening:   In the past year, patient has experienced: history of falling in past year    Number of falls: 1  Injured during fall?: Yes    Feels unsteady when standing or walking?: No    Worried about falling?: No      Urinary Incontinence Screening:   Patient has not leaked urine accidently in the last six months.     Home Safety:  Patient does not have trouble with stairs inside or outside of their home. Patient has working smoke alarms and has no working carbon monoxide detector. Home safety hazards include: none.     Nutrition:   Current diet is Regular.     Medications:   Patient is not currently taking any over-the-counter supplements. Patient is able to manage medications.     Activities of Daily Living (ADLs)/Instrumental Activities of Daily Living (IADLs):   Walk and transfer into and out of bed and chair?: Yes  Dress and groom yourself?: Yes    Bathe or shower yourself?: Yes    Feed yourself? Yes  Do your laundry/housekeeping?: Yes  Manage your money, pay your bills and track your expenses?: Yes  Make your own meals?: Yes    Do your own shopping?: Yes    Previous Hospitalizations:   Any hospitalizations or ED visits  within the last 12 months?: Yes    How many hospitalizations have you had in the last year?: 1-2    Advance Care Planning:   Living will: Yes    Durable POA for healthcare: Yes    Advanced directive: Yes    Advanced directive counseling given: Yes      Cognitive Screening:   Provider or family/friend/caregiver concerned regarding cognition?: No    PREVENTIVE SCREENINGS      Cardiovascular Screening:    General: Screening Not Indicated and History Lipid Disorder      Diabetes Screening:     General: Screening Current      Colorectal Cancer Screening:     General: Screening Not Indicated      Breast Cancer Screening:     General: Risks and Benefits Discussed    Due for: Mammogram        Cervical Cancer Screening:    General: Screening Not Indicated      Osteoporosis Screening:    General: Screening Not Indicated and History Osteoporosis      Abdominal Aortic Aneurysm (AAA) Screening:        General: Screening Not Indicated      Lung Cancer Screening:     General: Screening Not Indicated      Hepatitis C Screening:    General: Screening Not Indicated    Screening, Brief Intervention, and Referral to Treatment (SBIRT)    Screening  Typical number of drinks in a day: 0  Typical number of drinks in a week: 1  Interpretation: Low risk drinking behavior.    AUDIT-C Screenin) How often did you have a drink containing alcohol in the past year? monthly or less  2) How many drinks did you have on a typical day when you were drinking in the past year? 0  3) How often did you have 6 or more drinks on one occasion in the past year? never    AUDIT-C Score: 1  Interpretation: Score 0-2 (female): Negative screen for alcohol misuse    Single Item Drug Screening:  How often have you used an illegal drug (including marijuana) or a prescription medication for non-medical reasons in the past year? never    Single Item Drug Screen Score: 0  Interpretation: Negative screen for possible drug use disorder    Brief Intervention  Alcohol &  "drug use screenings were reviewed. No concerns regarding substance use disorder identified.     Other Counseling Topics:   Car/seat belt/driving safety, skin self-exam, sunscreen and calcium and vitamin D intake and regular weightbearing exercise.     Social Determinants of Health     Financial Resource Strain: Medium Risk (6/26/2023)    Overall Financial Resource Strain (CARDIA)     Difficulty of Paying Living Expenses: Somewhat hard   Food Insecurity: No Food Insecurity (7/8/2024)    Hunger Vital Sign     Worried About Running Out of Food in the Last Year: Never true     Ran Out of Food in the Last Year: Never true   Transportation Needs: No Transportation Needs (7/8/2024)    PRAPARE - Transportation     Lack of Transportation (Medical): No     Lack of Transportation (Non-Medical): No   Housing Stability: Low Risk  (7/8/2024)    Housing Stability Vital Sign     Unable to Pay for Housing in the Last Year: No     Number of Times Moved in the Last Year: 1     Homeless in the Last Year: No   Utilities: Not At Risk (7/8/2024)    [facility] Utilities     Threatened with loss of utilities: No     No results found.    Objective     /54 (BP Location: Left arm, Patient Position: Sitting, Cuff Size: Standard)   Pulse 69   Temp 98.8 °F (37.1 °C) (Temporal)   Resp 18   Ht 5' 3\" (1.6 m)   Wt 55.7 kg (122 lb 12.8 oz)   SpO2 97%   BMI 21.75 kg/m²     Physical Exam  Constitutional:       General: She is not in acute distress.     Appearance: She is well-developed.   HENT:      Head: Normocephalic.      Right Ear: External ear normal.      Left Ear: External ear normal.      Nose: No congestion or rhinorrhea.      Mouth/Throat:      Pharynx: No oropharyngeal exudate or posterior oropharyngeal erythema.   Eyes:      General: No scleral icterus.     Pupils: Pupils are equal, round, and reactive to light.   Neck:      Thyroid: No thyromegaly.      Trachea: No tracheal deviation.   Cardiovascular:      Rate and Rhythm: Normal " rate and regular rhythm.      Heart sounds: Normal heart sounds. No murmur heard.     Comments: Bilateral superficial varicose veins present  Pulmonary:      Effort: Pulmonary effort is normal. No respiratory distress.      Breath sounds: Normal breath sounds.   Chest:      Chest wall: No tenderness.   Abdominal:      General: Bowel sounds are normal.      Palpations: Abdomen is soft. There is no mass.      Tenderness: There is no abdominal tenderness.   Musculoskeletal:         General: Normal range of motion.      Cervical back: Normal range of motion and neck supple. No rigidity.      Right lower leg: No edema.      Left lower leg: No edema.   Lymphadenopathy:      Cervical: No cervical adenopathy.   Skin:     General: Skin is warm.      Findings: No erythema or rash.   Neurological:      Mental Status: She is alert and oriented to person, place, and time.      Cranial Nerves: No cranial nerve deficit.   Psychiatric:         Mood and Affect: Mood normal.         Behavior: Behavior normal.       Administrative Statements

## 2024-07-08 NOTE — ASSESSMENT & PLAN NOTE
Lab Results   Component Value Date    EGFR 75 03/22/2024    EGFR 81 12/04/2023    EGFR 84 11/30/2023    CREATININE 0.76 03/22/2024    CREATININE 0.72 12/04/2023    CREATININE 0.68 11/30/2023   Renal function is stable.  Advised for adequate oral hydration and to avoid nephrotoxic meds including over-the-counter NSAIDs.  Will continue to monitor

## 2024-07-17 PROBLEM — N30.01 ACUTE CYSTITIS WITH HEMATURIA: Status: RESOLVED | Noted: 2020-03-19 | Resolved: 2024-07-17

## 2024-07-22 ENCOUNTER — TELEPHONE (OUTPATIENT)
Age: 89
End: 2024-07-22

## 2024-07-22 DIAGNOSIS — F41.9 ANXIETY: ICD-10-CM

## 2024-07-22 NOTE — TELEPHONE ENCOUNTER
Per last office visit note:          Dr. Byrd, updated prescription to take 50mg qd.     Please sign off on refill.     Thank you!

## 2024-07-22 NOTE — TELEPHONE ENCOUNTER
Pts daughter called and said pt is taking Zoloft and Dr. Byrd had increased the dose to 50 mg but it does not match what is written on her prescription. Please review and advise.

## 2024-08-02 ENCOUNTER — HOSPITAL ENCOUNTER (OUTPATIENT)
Dept: RADIOLOGY | Facility: IMAGING CENTER | Age: 89
End: 2024-08-02
Payer: COMMERCIAL

## 2024-08-02 VITALS — HEIGHT: 63 IN | WEIGHT: 122 LBS | BODY MASS INDEX: 21.62 KG/M2

## 2024-08-02 DIAGNOSIS — Z12.31 VISIT FOR SCREENING MAMMOGRAM: ICD-10-CM

## 2024-08-02 PROCEDURE — 77063 BREAST TOMOSYNTHESIS BI: CPT

## 2024-08-02 PROCEDURE — 77067 SCR MAMMO BI INCL CAD: CPT

## 2024-08-06 NOTE — TELEPHONE ENCOUNTER
Patient called as there was some confusion at the pharmacy through their automated system, called [facility] while on the phone with patient to clarify, Amlodipine RX has been verified and pharmacy is filling for pt to  today.

## 2024-08-12 ENCOUNTER — OFFICE VISIT (OUTPATIENT)
Dept: INTERNAL MEDICINE CLINIC | Facility: OTHER | Age: 89
End: 2024-08-12
Payer: COMMERCIAL

## 2024-08-12 ENCOUNTER — TELEPHONE (OUTPATIENT)
Dept: INTERNAL MEDICINE CLINIC | Facility: OTHER | Age: 89
End: 2024-08-12

## 2024-08-12 VITALS
TEMPERATURE: 98.5 F | WEIGHT: 122.4 LBS | OXYGEN SATURATION: 98 % | HEART RATE: 72 BPM | BODY MASS INDEX: 21.69 KG/M2 | SYSTOLIC BLOOD PRESSURE: 130 MMHG | HEIGHT: 63 IN | DIASTOLIC BLOOD PRESSURE: 64 MMHG

## 2024-08-12 DIAGNOSIS — I10 ESSENTIAL HYPERTENSION: ICD-10-CM

## 2024-08-12 DIAGNOSIS — R73.03 PREDIABETES: ICD-10-CM

## 2024-08-12 DIAGNOSIS — K21.9 GASTROESOPHAGEAL REFLUX DISEASE WITHOUT ESOPHAGITIS: ICD-10-CM

## 2024-08-12 DIAGNOSIS — E78.2 MIXED HYPERLIPIDEMIA: ICD-10-CM

## 2024-08-12 DIAGNOSIS — N30.00 ACUTE CYSTITIS WITHOUT HEMATURIA: ICD-10-CM

## 2024-08-12 DIAGNOSIS — R30.0 DYSURIA: Primary | ICD-10-CM

## 2024-08-12 DIAGNOSIS — N18.31 STAGE 3A CHRONIC KIDNEY DISEASE (HCC): ICD-10-CM

## 2024-08-12 LAB
SL AMB  POCT GLUCOSE, UA: NEGATIVE
SL AMB LEUKOCYTE ESTERASE,UA: ABNORMAL
SL AMB POCT BILIRUBIN,UA: ABNORMAL
SL AMB POCT BLOOD,UA: ABNORMAL
SL AMB POCT CLARITY,UA: ABNORMAL
SL AMB POCT COLOR,UA: YELLOW
SL AMB POCT KETONES,UA: ABNORMAL
SL AMB POCT NITRITE,UA: NEGATIVE
SL AMB POCT PH,UA: 6
SL AMB POCT SPECIFIC GRAVITY,UA: >=1.03
SL AMB POCT URINE PROTEIN: >=300
SL AMB POCT UROBILINOGEN: ABNORMAL

## 2024-08-12 PROCEDURE — G2211 COMPLEX E/M VISIT ADD ON: HCPCS | Performed by: INTERNAL MEDICINE

## 2024-08-12 PROCEDURE — 81003 URINALYSIS AUTO W/O SCOPE: CPT | Performed by: INTERNAL MEDICINE

## 2024-08-12 PROCEDURE — 99214 OFFICE O/P EST MOD 30 MIN: CPT | Performed by: INTERNAL MEDICINE

## 2024-08-12 RX ORDER — CEPHALEXIN 500 MG/1
500 CAPSULE ORAL 3 TIMES DAILY
Qty: 21 CAPSULE | Refills: 0 | Status: SHIPPED | OUTPATIENT
Start: 2024-08-12 | End: 2024-08-19

## 2024-08-12 NOTE — ASSESSMENT & PLAN NOTE
Urine analysis done in office today is positive for leukocyte Estrace and will be treated cephalexin 500 mg p.o. 3 times daily for 1 week.  Also advised for increase fluid intake.  Can take Tylenol for pain and discomfort

## 2024-08-12 NOTE — PROGRESS NOTES
Assessment/Plan:    Prediabetes  Continue with low sugar/low-carb diet    CKD (chronic kidney disease) stage 3, GFR 30-59 ml/min ([facility])  Lab Results   Component Value Date    EGFR 75 03/22/2024    EGFR 81 12/04/2023    EGFR 84 11/30/2023    CREATININE 0.76 03/22/2024    CREATININE 0.72 12/04/2023    CREATININE 0.68 11/30/2023   Renal function is stable.  Continue with adequate oral hydration and to avoid nephrotoxic.  Will continue to monitor    Essential hypertension  Blood pressure is stable on present regimen.    Gastroesophageal reflux disease without esophagitis  Doing well on present regimen.  Continue with healthy diet    Acute cystitis without hematuria  Urine analysis done in office today is positive for leukocyte Estrace and will be treated cephalexin 500 mg p.o. 3 times daily for 1 week.  Also advised for increase fluid intake.  Can take Tylenol for pain and discomfort       Diagnoses and all orders for this visit:    Dysuria  -     POCT urine dip auto non-scope    Stage 3a chronic kidney disease (HCC)    Essential hypertension    Mixed hyperlipidemia    Acute cystitis without hematuria  -     cephalexin (KEFLEX) 500 mg capsule; Take 1 capsule (500 mg total) by mouth 3 (three) times a day for 7 days    Prediabetes    Gastroesophageal reflux disease without esophagitis               Subjective:          Patient ID: Fallon Proctor is a 89 y.o. female.    Patient came to office with a complaint of frequency and dysuria for the last 3 days gradually getting worse.  No fever or chills.  Also episode of diarrhea this morning.    Urinary Tract Infection   Associated symptoms include frequency.   Diarrhea   Associated symptoms include coughing.   Cough        The following portions of the patient's history were reviewed and updated as appropriate: allergies, current medications, past family history, past medical history, past social history, past surgical history, and problem list.    Review of Systems    Respiratory:  Positive for cough.    Gastrointestinal:  Positive for diarrhea.   Genitourinary:  Positive for dysuria and frequency.         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)     Last Assessed:3/18/2014         Current Outpatient Medications:     acetaminophen (TYLENOL) 500 mg tablet, Take 1 tablet (500 mg total) by mouth every 4 (four) hours as needed (knee and back pain), Disp: 90 tablet, Rfl: 1    amLODIPine (NORVASC) 5 mg tablet, Take 1 tablet (5 mg total) by mouth daily, Disp: 90 tablet, Rfl: 1    atorvastatin (LIPITOR) 20 mg tablet, Take 1 tablet (20 mg total) by mouth daily, Disp: 90 tablet, Rfl: 1    cephalexin (KEFLEX) 500 mg capsule, Take 1 capsule (500 mg total) by mouth 3 (three) times a day for 7 days, Disp: 21 capsule, Rfl: 0    Cholecalciferol (Vitamin D3) 1000 units CAPS, Take 1 capsule by mouth if needed, Disp: , Rfl:     Cranberry-Vitamin C-Probiotic (AZO CRANBERRY PO), Take by mouth as needed, Disp: , Rfl:     lidocaine (LIDODERM) 5 %, Apply 1 patch topically over 12 hours every 24 hours Remove & Discard patch within 12 hours or as directed by MD (Patient taking differently: Apply 1 patch topically if needed Remove & Discard patch within 12 hours or as directed by MD), Disp: 15 patch, Rfl: 0    LORazepam (ATIVAN) 0.5 mg tablet, Take 1 tablet (0.5 mg total) by mouth as needed (help sleep), Disp: 30 tablet, Rfl: 0    omeprazole (PriLOSEC) 20 mg delayed release capsule, Take 1 capsule (20 mg total) by mouth daily, Disp: 90 capsule, Rfl: 1    sertraline (ZOLOFT) 50 mg tablet, Take 1 tablet (50  "mg total) by mouth daily, Disp: 90 tablet, Rfl: 1    acetaminophen (TYLENOL) 500 mg tablet, Take 1 tablet (500 mg total) by mouth every 6 (six) hours as needed for mild pain or moderate pain (Patient not taking: Reported on 8/12/2024), Disp: 30 tablet, Rfl: 0    Current Facility-Administered Medications:     denosumab (PROLIA) subcutaneous injection 60 mg, 60 mg, Subcutaneous, Q6 Months, Wali Byrd MD, 60 mg at 09/18/23 7210    Allergies   Allergen Reactions    Flexeril [Cyclobenzaprine] Syncope    Daypro [Oxaprozin]      Unknown allergic reaction    Minocycline Other (See Comments)     Loss of taste       Social History   Past Surgical History:   Procedure Laterality Date    APPENDECTOMY      CATARACT EXTRACTION, BILATERAL      HYSTERECTOMY      INCISIONAL BREAST BIOPSY       Family History   Problem Relation Age of Onset    Diabetes Mother     Varicose Veins Mother     No Known Problems Sister     No Known Problems Daughter     No Known Problems Daughter     No Known Problems Maternal Grandmother     No Known Problems Maternal Grandfather     No Known Problems Paternal Grandmother     No Known Problems Paternal Grandfather     Diabetes Brother        Objective:  /64   Pulse 72   Temp 98.5 °F (36.9 °C) (Tympanic)   Ht 5' 3\" (1.6 m)   Wt 55.5 kg (122 lb 6.4 oz)   SpO2 98% Comment: ra  BMI 21.68 kg/m²   Body mass index is 21.68 kg/m².     Physical Exam              "

## 2024-08-12 NOTE — TELEPHONE ENCOUNTER
At checkout, patient declined making next Prolia appointment because she stated Dr. Salazar was going to try to get her another injection.

## 2024-08-12 NOTE — ASSESSMENT & PLAN NOTE
Lab Results   Component Value Date    EGFR 75 03/22/2024    EGFR 81 12/04/2023    EGFR 84 11/30/2023    CREATININE 0.76 03/22/2024    CREATININE 0.72 12/04/2023    CREATININE 0.68 11/30/2023   Renal function is stable.  Continue with adequate oral hydration and to avoid nephrotoxic.  Will continue to monitor

## 2024-09-11 PROBLEM — N30.00 ACUTE CYSTITIS WITHOUT HEMATURIA: Status: RESOLVED | Noted: 2020-03-19 | Resolved: 2024-09-11

## 2024-09-26 ENCOUNTER — TELEPHONE (OUTPATIENT)
Age: 89
End: 2024-09-26

## 2024-09-26 NOTE — TELEPHONE ENCOUNTER
Patient called inquiring if she should Flu Shot , RSV and Covid booster prior to having her prolia shot administered on 12/21/24.      Please advise.  Thank you

## 2024-10-08 ENCOUNTER — TELEPHONE (OUTPATIENT)
Age: 89
End: 2024-10-08

## 2024-10-08 ENCOUNTER — OFFICE VISIT (OUTPATIENT)
Dept: INTERNAL MEDICINE CLINIC | Facility: OTHER | Age: 89
End: 2024-10-08
Payer: COMMERCIAL

## 2024-10-08 ENCOUNTER — HOSPITAL ENCOUNTER (OUTPATIENT)
Dept: RADIOLOGY | Facility: IMAGING CENTER | Age: 89
Discharge: HOME | End: 2024-10-08
Payer: COMMERCIAL

## 2024-10-08 VITALS
BODY MASS INDEX: 22.15 KG/M2 | DIASTOLIC BLOOD PRESSURE: 68 MMHG | TEMPERATURE: 98 F | WEIGHT: 125 LBS | SYSTOLIC BLOOD PRESSURE: 132 MMHG | HEIGHT: 63 IN | OXYGEN SATURATION: 98 % | HEART RATE: 85 BPM

## 2024-10-08 DIAGNOSIS — I10 ESSENTIAL HYPERTENSION: Primary | ICD-10-CM

## 2024-10-08 DIAGNOSIS — M25.562 ACUTE PAIN OF LEFT KNEE: ICD-10-CM

## 2024-10-08 DIAGNOSIS — K21.9 GASTROESOPHAGEAL REFLUX DISEASE WITHOUT ESOPHAGITIS: ICD-10-CM

## 2024-10-08 DIAGNOSIS — N18.31 STAGE 3A CHRONIC KIDNEY DISEASE (HCC): ICD-10-CM

## 2024-10-08 DIAGNOSIS — E78.2 MIXED HYPERLIPIDEMIA: ICD-10-CM

## 2024-10-08 DIAGNOSIS — E55.9 VITAMIN D DEFICIENCY: ICD-10-CM

## 2024-10-08 PROCEDURE — 99214 OFFICE O/P EST MOD 30 MIN: CPT | Performed by: INTERNAL MEDICINE

## 2024-10-08 PROCEDURE — G2211 COMPLEX E/M VISIT ADD ON: HCPCS | Performed by: INTERNAL MEDICINE

## 2024-10-08 PROCEDURE — 73560 X-RAY EXAM OF KNEE 1 OR 2: CPT

## 2024-10-08 NOTE — ASSESSMENT & PLAN NOTE
Stat x-ray left knee ordered and also referred patient to orthopedic surgeon for further management.  Advised to use Voltaren gel and can take Tylenol 1000 mg 3 times daily as needed for pain

## 2024-10-08 NOTE — PROGRESS NOTES
Assessment/Plan:    Mixed hyperlipidemia  Continue with present dose of atorvastatin.    Vitamin D deficiency  Continue with vitamin D3 supplementation    Acute pain of left knee  Stat x-ray left knee ordered and also referred patient to orthopedic surgeon for further management.  Advised to use Voltaren gel and can take Tylenol 1000 mg 3 times daily as needed for pain    CKD (chronic kidney disease) stage 3, GFR 30-59 ml/min ([facility])  Lab Results   Component Value Date    EGFR 75 03/22/2024    EGFR 81 12/04/2023    EGFR 84 11/30/2023    CREATININE 0.76 03/22/2024    CREATININE 0.72 12/04/2023    CREATININE 0.68 11/30/2023   Renal function is stable.  Will continue to monitor.  Continue with adequate oral hydration and to avoid nephrotoxic med including OTC NSAIDs    Gastroesophageal reflux disease without esophagitis  Continue with present regimen.    Essential hypertension  Stable on present regimen.  Continue with low-salt diet       Diagnoses and all orders for this visit:    Essential hypertension    Gastroesophageal reflux disease without esophagitis    Stage 3a chronic kidney disease (HCC)    Mixed hyperlipidemia    Acute pain of left knee  -     XR knee 3 vw left non injury; Future  -     Ambulatory Referral to Orthopedic Surgery; Future    Vitamin D deficiency               Subjective:          Patient ID: Fallon Proctor is a 89 y.o. female.    Patient came to office with a complaint of left knee pain for the last 4 days.  Denied any trauma or fall.  But she does go for dancing couple of times a week.    Knee Pain         The following portions of the patient's history were reviewed and updated as appropriate: allergies, current medications, past family history, past medical history, past social history, past surgical history, and problem list.    Review of Systems   Constitutional:  Negative for fatigue and fever.   HENT:  Positive for hearing loss. Negative for congestion, ear discharge, ear pain,  postnasal drip, sinus pressure, sore throat, tinnitus and trouble swallowing.    Eyes:  Negative for discharge, itching and visual disturbance.   Respiratory:  Negative for cough and shortness of breath.    Cardiovascular:  Negative for chest pain and palpitations.   Gastrointestinal:  Negative for abdominal pain, diarrhea, nausea and vomiting.   Endocrine: Negative for cold intolerance and polyuria.   Genitourinary:  Negative for difficulty urinating, dysuria and urgency.   Musculoskeletal:  Positive for arthralgias and gait problem. Negative for neck pain.   Skin:  Negative for rash.   Allergic/Immunologic: Negative for environmental allergies.   Neurological:  Negative for dizziness, weakness and headaches.   Psychiatric/Behavioral:  Negative for agitation. The patient is not nervous/anxious.          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)     Last Assessed:3/18/2014         Current Outpatient Medications:     acetaminophen (TYLENOL) 500 mg tablet, Take 1 tablet (500 mg total) by mouth every 4 (four) hours as needed (knee and back pain), Disp: 90 tablet, Rfl: 1    amLODIPine (NORVASC) 5 mg tablet, Take 1 tablet (5 mg total) by mouth daily, Disp: 90 tablet, Rfl: 1    atorvastatin (LIPITOR) 20 mg tablet, Take 1 tablet (20 mg total) by mouth daily, Disp: 90 tablet, Rfl: 1    Cholecalciferol (Vitamin D3) 1000 units CAPS, Take 1 capsule by mouth if needed, Disp: , Rfl:     Cranberry-Vitamin C-Probiotic (AZO CRANBERRY PO), Take by mouth as needed, Disp: , Rfl:     LORazepam (ATIVAN) 0.5 mg tablet, Take 1 tablet (0.5 mg  "total) by mouth as needed (help sleep), Disp: 30 tablet, Rfl: 0    omeprazole (PriLOSEC) 20 mg delayed release capsule, Take 1 capsule (20 mg total) by mouth daily, Disp: 90 capsule, Rfl: 1    sertraline (ZOLOFT) 50 mg tablet, Take 1 tablet (50 mg total) by mouth daily, Disp: 90 tablet, Rfl: 1    acetaminophen (TYLENOL) 500 mg tablet, Take 1 tablet (500 mg total) by mouth every 6 (six) hours as needed for mild pain or moderate pain (Patient not taking: Reported on 10/8/2024), Disp: 30 tablet, Rfl: 0    lidocaine (LIDODERM) 5 %, Apply 1 patch topically over 12 hours every 24 hours Remove & Discard patch within 12 hours or as directed by MD (Patient not taking: Reported on 10/8/2024), Disp: 15 patch, Rfl: 0    Current Facility-Administered Medications:     denosumab (PROLIA) subcutaneous injection 60 mg, 60 mg, Subcutaneous, Q6 Months, Wali yBrd MD, 60 mg at 09/18/23 1629    Allergies   Allergen Reactions    Flexeril [Cyclobenzaprine] Syncope    Daypro [Oxaprozin]      Unknown allergic reaction    Minocycline Other (See Comments)     Loss of taste       Social History   Past Surgical History:   Procedure Laterality Date    APPENDECTOMY      CATARACT EXTRACTION, BILATERAL      HYSTERECTOMY      INCISIONAL BREAST BIOPSY       Family History   Problem Relation Age of Onset    Diabetes Mother     Varicose Veins Mother     No Known Problems Sister     No Known Problems Daughter     No Known Problems Daughter     No Known Problems Maternal Grandmother     No Known Problems Maternal Grandfather     No Known Problems Paternal Grandmother     No Known Problems Paternal Grandfather     Diabetes Brother        Objective:  /68 (BP Location: Left arm, Patient Position: Sitting, Cuff Size: Adult)   Pulse 85   Temp 98 °F (36.7 °C) (Temporal)   Ht 5' 3\" (1.6 m)   Wt 56.7 kg (125 lb)   SpO2 98%   BMI 22.14 kg/m²   Body mass index is 22.14 kg/m².     Physical Exam  Constitutional:       General: She is not in acute " distress.     Appearance: She is well-developed.   HENT:      Head: Normocephalic.      Comments: Mild bilateral middle ear effusion noted     Right Ear: Ear canal and external ear normal. There is no impacted cerumen.      Left Ear: Ear canal and external ear normal. There is no impacted cerumen.      Nose: No rhinorrhea.      Mouth/Throat:      Pharynx: No oropharyngeal exudate or posterior oropharyngeal erythema.   Eyes:      General: No scleral icterus.     Pupils: Pupils are equal, round, and reactive to light.   Neck:      Thyroid: No thyromegaly.      Trachea: No tracheal deviation.   Cardiovascular:      Rate and Rhythm: Normal rate and regular rhythm.      Heart sounds: Normal heart sounds. No murmur heard.  Pulmonary:      Effort: Pulmonary effort is normal. No respiratory distress.      Breath sounds: Normal breath sounds.   Chest:      Chest wall: No tenderness.   Abdominal:      General: Bowel sounds are normal.      Palpations: Abdomen is soft. There is no mass.      Tenderness: There is no abdominal tenderness.   Musculoskeletal:      Cervical back: Normal range of motion and neck supple. No rigidity.      Right lower leg: No edema.      Left lower leg: No edema.      Comments: Limited range of motion of left knee noted.  Mild synovial swelling noted   Lymphadenopathy:      Cervical: No cervical adenopathy.   Skin:     General: Skin is warm.      Findings: No erythema or rash.   Neurological:      Mental Status: She is alert and oriented to person, place, and time.      Cranial Nerves: No cranial nerve deficit.   Psychiatric:         Mood and Affect: Mood normal.         Behavior: Behavior normal.

## 2024-10-08 NOTE — ASSESSMENT & PLAN NOTE
Lab Results   Component Value Date    EGFR 75 03/22/2024    EGFR 81 12/04/2023    EGFR 84 11/30/2023    CREATININE 0.76 03/22/2024    CREATININE 0.72 12/04/2023    CREATININE 0.68 11/30/2023   Renal function is stable.  Will continue to monitor.  Continue with adequate oral hydration and to avoid nephrotoxic med including OTC NSAIDs

## 2024-10-08 NOTE — TELEPHONE ENCOUNTER
Pt called in stating she cannot bend her left knee. This has been going on for a few day sand she thinks its her meniscus, was asking for a xray order. Offered patient appointment for 330 this afternoon in which she agreed to. Scheduled patient, please advise.

## 2024-10-14 DIAGNOSIS — G47.00 INSOMNIA, UNSPECIFIED TYPE: ICD-10-CM

## 2024-10-14 DIAGNOSIS — K21.9 GASTROESOPHAGEAL REFLUX DISEASE WITHOUT ESOPHAGITIS: ICD-10-CM

## 2024-10-14 DIAGNOSIS — I10 ESSENTIAL HYPERTENSION: ICD-10-CM

## 2024-10-14 DIAGNOSIS — E78.2 MIXED HYPERLIPIDEMIA: ICD-10-CM

## 2024-10-14 RX ORDER — LORAZEPAM 0.5 MG/1
0.5 TABLET ORAL AS NEEDED
Qty: 30 TABLET | Refills: 0 | Status: SHIPPED | OUTPATIENT
Start: 2024-10-14 | End: 2024-10-24 | Stop reason: SDUPTHER

## 2024-10-14 RX ORDER — AMLODIPINE BESYLATE 5 MG/1
5 TABLET ORAL DAILY
Qty: 90 TABLET | Refills: 1 | Status: SHIPPED | OUTPATIENT
Start: 2024-10-14

## 2024-10-14 RX ORDER — ATORVASTATIN CALCIUM 20 MG/1
20 TABLET, FILM COATED ORAL DAILY
Qty: 90 TABLET | Refills: 1 | Status: SHIPPED | OUTPATIENT
Start: 2024-10-14

## 2024-10-14 NOTE — TELEPHONE ENCOUNTER
Medication: amlodipine    Dose/Frequency: 5 mg - take 1 tablet by mouth daily    Quantity: 90    Pharmacy: [facility]    Office:   [x] PCP/Provider -   [] Speciality/Provider -     Does the patient have enough for 3 days?   [x] Yes   [] No - Send as HP to POD    Medication: Atorvastatin    Dose/Frequency: 20 mg - Take one tablet by mouth daily    Quantity: 90    Pharmacy: [facility]    Office:   [x] PCP/Provider -   [] Speciality/Provider -     Does the patient have enough for 3 days?   [x] Yes   [] No - Send as HP to POD    Medication: Ativan     Dose/Frequency: 0.5 mg - take 1 tablet (0.5 mg) by mouth as needed to help with sleep    Quantity: 30    Ph[facility]    Office:   [x] PCP/Provider -   [] Speciality/Provider -     Does the patient have enough for 3 days?   [x] Yes   [] No - Send as HP to POD     Medication: omeprazole     Dose/Frequency: 20 mg - Take 1 capsule (20 mg) by mouth daily    Quantity: 90    Pharm[facility]    Office:   [x] PCP/Provider -   [] Speciality/Provider -     Does the patient have enough for 3 days?   [x] Yes   [] No - Send as HP to POD

## 2024-10-15 ENCOUNTER — RA CDI HCC (OUTPATIENT)
Dept: OTHER | Facility: HOSPITAL | Age: 89
End: 2024-10-15

## 2024-10-16 ENCOUNTER — TELEPHONE (OUTPATIENT)
Age: 89
End: 2024-10-16

## 2024-10-16 DIAGNOSIS — N18.31 STAGE 3A CHRONIC KIDNEY DISEASE (HCC): Primary | ICD-10-CM

## 2024-10-16 NOTE — TELEPHONE ENCOUNTER
Patient called in to ask if PCP would like her to have any lab work prior to OV 10/21/24 for her Prolia shot. Please call patient with PCP response. Okay to leave a message. Thank you

## 2024-10-21 ENCOUNTER — OFFICE VISIT (OUTPATIENT)
Dept: INTERNAL MEDICINE CLINIC | Facility: OTHER | Age: 89
End: 2024-10-21
Payer: COMMERCIAL

## 2024-10-21 ENCOUNTER — TELEPHONE (OUTPATIENT)
Dept: INTERNAL MEDICINE CLINIC | Facility: OTHER | Age: 89
End: 2024-10-21

## 2024-10-21 VITALS
OXYGEN SATURATION: 95 % | TEMPERATURE: 98.4 F | SYSTOLIC BLOOD PRESSURE: 138 MMHG | RESPIRATION RATE: 18 BRPM | WEIGHT: 124 LBS | BODY MASS INDEX: 21.97 KG/M2 | DIASTOLIC BLOOD PRESSURE: 68 MMHG | HEIGHT: 63 IN | HEART RATE: 74 BPM

## 2024-10-21 DIAGNOSIS — M81.0 AGE-RELATED OSTEOPOROSIS WITHOUT CURRENT PATHOLOGICAL FRACTURE: Primary | ICD-10-CM

## 2024-10-21 DIAGNOSIS — K21.9 GASTROESOPHAGEAL REFLUX DISEASE WITHOUT ESOPHAGITIS: ICD-10-CM

## 2024-10-21 DIAGNOSIS — R73.03 PREDIABETES: ICD-10-CM

## 2024-10-21 DIAGNOSIS — I10 ESSENTIAL HYPERTENSION: ICD-10-CM

## 2024-10-21 DIAGNOSIS — N18.31 STAGE 3A CHRONIC KIDNEY DISEASE (HCC): ICD-10-CM

## 2024-10-21 DIAGNOSIS — E78.2 MIXED HYPERLIPIDEMIA: ICD-10-CM

## 2024-10-21 DIAGNOSIS — F41.9 ANXIETY: ICD-10-CM

## 2024-10-21 PROBLEM — E87.5 HYPERKALEMIA: Status: RESOLVED | Noted: 2023-12-04 | Resolved: 2024-10-21

## 2024-10-21 PROCEDURE — 96372 THER/PROPH/DIAG INJ SC/IM: CPT | Performed by: INTERNAL MEDICINE

## 2024-10-21 PROCEDURE — 99214 OFFICE O/P EST MOD 30 MIN: CPT | Performed by: INTERNAL MEDICINE

## 2024-10-21 NOTE — ASSESSMENT & PLAN NOTE
Injection Prolia given left arm.  Tolerated very well.  Continue with calcium, vitamin D3 and weightbearing exercises

## 2024-10-21 NOTE — ASSESSMENT & PLAN NOTE
Lab Results   Component Value Date    EGFR 75 03/22/2024    EGFR 81 12/04/2023    EGFR 84 11/30/2023    CREATININE 0.76 03/22/2024    CREATININE 0.72 12/04/2023    CREATININE 0.68 11/30/2023   Advised for adequate oral hydration.  She is due for renal function as soon as possible

## 2024-10-21 NOTE — PROGRESS NOTES
Assessment/Plan:    Essential hypertension  Blood pressure is well-controlled on present regimen.  Continue with low-salt diet    Gastroesophageal reflux disease without esophagitis  Doing well on present dose of omeprazole.  Continue with healthy diet    Age-related osteoporosis without current pathological fracture  Injection Prolia given left arm.  Tolerated very well.  Continue with calcium, vitamin D3 and weightbearing exercises    CKD (chronic kidney disease) stage 3, GFR 30-59 ml/min ([facility])  Lab Results   Component Value Date    EGFR 75 03/22/2024    EGFR 81 12/04/2023    EGFR 84 11/30/2023    CREATININE 0.76 03/22/2024    CREATININE 0.72 12/04/2023    CREATININE 0.68 11/30/2023   Advised for adequate oral hydration.  She is due for renal function as soon as possible    Mixed hyperlipidemia  Continue with atorvastatin 20 mg daily along with low-fat diet.  She is due for lipid profile    Prediabetes  Continue with low sugar/low-carb diet.  Will check hemoglobin A1c.       Diagnoses and all orders for this visit:    Age-related osteoporosis without current pathological fracture  -     denosumab (PROLIA) subcutaneous injection 60 mg    Essential hypertension    Gastroesophageal reflux disease without esophagitis    Stage 3a chronic kidney disease (HCC)    Anxiety    Mixed hyperlipidemia    Prediabetes               Subjective:          Patient ID: Fallon Proctor is a 89 y.o. female.    Patient is here for follow-up.  Was unable to do blood work requested on previous visit.  Also need Prolia injection.  Left knee pain is better but still hurt with walking.  So far she was unable to see orthopedic surgeon and even did not call to make appointment.        The following portions of the patient's history were reviewed and updated as appropriate: allergies, current medications, past family history, past medical history, past social history, past surgical history, and problem list.    Review of Systems    Constitutional:  Negative for fatigue and fever.   HENT:  Negative for congestion, ear discharge, ear pain, postnasal drip, sinus pressure, sore throat, tinnitus and trouble swallowing.    Eyes:  Negative for discharge, itching and visual disturbance.   Respiratory:  Negative for cough and shortness of breath.    Cardiovascular:  Negative for chest pain and palpitations.   Gastrointestinal:  Negative for abdominal pain, diarrhea, nausea and vomiting.   Endocrine: Negative for cold intolerance and polyuria.   Genitourinary:  Negative for difficulty urinating, dysuria and urgency.   Musculoskeletal:  Positive for arthralgias. Negative for neck pain.   Skin:  Negative for rash.   Allergic/Immunologic: Negative for environmental allergies.   Neurological:  Negative for dizziness, weakness and headaches.   Psychiatric/Behavioral:  The patient is not nervous/anxious.          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)     Last Assessed:3/18/2014         Current Outpatient Medications:     acetaminophen (TYLENOL) 500 mg tablet, Take 1 tablet (500 mg total) by mouth every 4 (four) hours as needed (knee and back pain), Disp: 90 tablet, Rfl: 1    amLODIPine (NORVASC) 5 mg tablet, Take 1 tablet (5 mg total) by mouth daily, Disp: 90 tablet, Rfl: 1    atorvastatin (LIPITOR) 20 mg tablet, Take 1 tablet (20 mg total) by mouth daily, Disp: 90 tablet, Rfl: 1    Cholecalciferol (Vitamin D3) 1000 units CAPS, Take 1 capsule by mouth if needed, Disp: , Rfl:     Cranberry-Vitamin C-Probiotic (AZO CRANBERRY PO), Take by mouth as needed,  "Disp: , Rfl:     LORazepam (ATIVAN) 0.5 mg tablet, Take 1 tablet (0.5 mg total) by mouth as needed (help sleep), Disp: 30 tablet, Rfl: 0    omeprazole (PriLOSEC) 20 mg delayed release capsule, Take 1 capsule (20 mg total) by mouth daily, Disp: 90 capsule, Rfl: 1    sertraline (ZOLOFT) 50 mg tablet, Take 1 tablet (50 mg total) by mouth daily, Disp: 90 tablet, Rfl: 1    acetaminophen (TYLENOL) 500 mg tablet, Take 1 tablet (500 mg total) by mouth every 6 (six) hours as needed for mild pain or moderate pain (Patient not taking: Reported on 10/8/2024), Disp: 30 tablet, Rfl: 0    lidocaine (LIDODERM) 5 %, Apply 1 patch topically over 12 hours every 24 hours Remove & Discard patch within 12 hours or as directed by MD (Patient not taking: Reported on 10/8/2024), Disp: 15 patch, Rfl: 0    Current Facility-Administered Medications:     denosumab (PROLIA) subcutaneous injection 60 mg, 60 mg, Subcutaneous, Q6 Months, Wali Byrd MD, 60 mg at 09/18/23 1629    denosumab (PROLIA) subcutaneous injection 60 mg, 60 mg, Subcutaneous, Once,     Allergies   Allergen Reactions    Flexeril [Cyclobenzaprine] Syncope    Daypro [Oxaprozin]      Unknown allergic reaction    Minocycline Other (See Comments)     Loss of taste       Social History   Past Surgical History:   Procedure Laterality Date    APPENDECTOMY      CATARACT EXTRACTION, BILATERAL      HYSTERECTOMY      INCISIONAL BREAST BIOPSY       Family History   Problem Relation Age of Onset    Diabetes Mother     Varicose Veins Mother     No Known Problems Sister     No Known Problems Daughter     No Known Problems Daughter     No Known Problems Maternal Grandmother     No Known Problems Maternal Grandfather     No Known Problems Paternal Grandmother     No Known Problems Paternal Grandfather     Diabetes Brother        Objective:  /68 (BP Location: Left arm, Patient Position: Sitting, Cuff Size: Standard)   Pulse 74   Temp 98.4 °F (36.9 °C) (Temporal)   Resp 18   Ht 5' 3\" " (1.6 m)   Wt 56.2 kg (124 lb)   SpO2 95%   BMI 21.97 kg/m²   Body mass index is 21.97 kg/m².     Physical Exam  Constitutional:       General: She is not in acute distress.     Appearance: She is well-developed.   HENT:      Head: Normocephalic.      Right Ear: External ear normal. There is no impacted cerumen.      Left Ear: External ear normal. There is no impacted cerumen.      Nose: No congestion or rhinorrhea.      Mouth/Throat:      Pharynx: No posterior oropharyngeal erythema.   Eyes:      General: No scleral icterus.     Pupils: Pupils are equal, round, and reactive to light.   Neck:      Thyroid: No thyromegaly.      Trachea: No tracheal deviation.   Cardiovascular:      Rate and Rhythm: Normal rate and regular rhythm.      Heart sounds: Normal heart sounds. No murmur heard.  Pulmonary:      Effort: Pulmonary effort is normal. No respiratory distress.      Breath sounds: Normal breath sounds.   Chest:      Chest wall: No tenderness.   Abdominal:      General: Bowel sounds are normal.      Palpations: Abdomen is soft. There is no mass.      Tenderness: There is no abdominal tenderness.   Musculoskeletal:         General: Normal range of motion.      Cervical back: Normal range of motion and neck supple. No rigidity.      Right lower leg: No edema.      Left lower leg: No edema.   Lymphadenopathy:      Cervical: No cervical adenopathy.   Skin:     General: Skin is warm.      Findings: No erythema or rash.   Neurological:      Mental Status: She is alert and oriented to person, place, and time.      Cranial Nerves: No cranial nerve deficit.   Psychiatric:         Mood and Affect: Mood normal.         Behavior: Behavior normal.

## 2024-10-22 ENCOUNTER — APPOINTMENT (OUTPATIENT)
Dept: LAB | Facility: IMAGING CENTER | Age: 89
End: 2024-10-22
Payer: COMMERCIAL

## 2024-10-22 DIAGNOSIS — E78.2 MIXED HYPERLIPIDEMIA: ICD-10-CM

## 2024-10-22 DIAGNOSIS — R73.03 PREDIABETES: ICD-10-CM

## 2024-10-22 DIAGNOSIS — N18.31 STAGE 3A CHRONIC KIDNEY DISEASE (HCC): ICD-10-CM

## 2024-10-22 DIAGNOSIS — K21.9 GASTROESOPHAGEAL REFLUX DISEASE WITHOUT ESOPHAGITIS: ICD-10-CM

## 2024-10-22 LAB
ERYTHROCYTE [DISTWIDTH] IN BLOOD BY AUTOMATED COUNT: 13 % (ref 11.6–15.1)
HCT VFR BLD AUTO: 44.4 % (ref 34.8–46.1)
HGB BLD-MCNC: 13.6 G/DL (ref 11.5–15.4)
MCH RBC QN AUTO: 27.9 PG (ref 26.8–34.3)
MCHC RBC AUTO-ENTMCNC: 30.6 G/DL (ref 31.4–37.4)
MCV RBC AUTO: 91 FL (ref 82–98)
PLATELET # BLD AUTO: 259 THOUSANDS/UL (ref 149–390)
PMV BLD AUTO: 10.8 FL (ref 8.9–12.7)
RBC # BLD AUTO: 4.88 MILLION/UL (ref 3.81–5.12)
WBC # BLD AUTO: 11.12 THOUSAND/UL (ref 4.31–10.16)

## 2024-10-22 PROCEDURE — 80061 LIPID PANEL: CPT

## 2024-10-22 PROCEDURE — 85027 COMPLETE CBC AUTOMATED: CPT

## 2024-10-22 PROCEDURE — 36415 COLL VENOUS BLD VENIPUNCTURE: CPT

## 2024-10-22 PROCEDURE — 80053 COMPREHEN METABOLIC PANEL: CPT

## 2024-10-22 PROCEDURE — 83036 HEMOGLOBIN GLYCOSYLATED A1C: CPT

## 2024-10-22 NOTE — TELEPHONE ENCOUNTER
Marked it on both calendars and updated the list.  If patient has the same insurance for next year, THEN THE AUTHORIZATION WILL BE GOOD FOR THIS INJECTION

## 2024-10-23 LAB
ALBUMIN SERPL BCG-MCNC: 4.1 G/DL (ref 3.5–5)
ALP SERPL-CCNC: 53 U/L (ref 34–104)
ALT SERPL W P-5'-P-CCNC: 8 U/L (ref 7–52)
ANION GAP SERPL CALCULATED.3IONS-SCNC: 8 MMOL/L (ref 4–13)
AST SERPL W P-5'-P-CCNC: 17 U/L (ref 13–39)
BILIRUB SERPL-MCNC: 0.48 MG/DL (ref 0.2–1)
BUN SERPL-MCNC: 18 MG/DL (ref 5–25)
CALCIUM SERPL-MCNC: 9.9 MG/DL (ref 8.4–10.2)
CHLORIDE SERPL-SCNC: 103 MMOL/L (ref 96–108)
CHOLEST SERPL-MCNC: 161 MG/DL
CO2 SERPL-SCNC: 29 MMOL/L (ref 21–32)
CREAT SERPL-MCNC: 0.76 MG/DL (ref 0.6–1.3)
EST. AVERAGE GLUCOSE BLD GHB EST-MCNC: 134 MG/DL
GFR SERPL CREATININE-BSD FRML MDRD: 70 ML/MIN/1.73SQ M
GLUCOSE P FAST SERPL-MCNC: 98 MG/DL (ref 65–99)
HBA1C MFR BLD: 6.3 %
HDLC SERPL-MCNC: 56 MG/DL
LDLC SERPL CALC-MCNC: 86 MG/DL (ref 0–100)
POTASSIUM SERPL-SCNC: 4.3 MMOL/L (ref 3.5–5.3)
PROT SERPL-MCNC: 7 G/DL (ref 6.4–8.4)
SODIUM SERPL-SCNC: 140 MMOL/L (ref 135–147)
TRIGL SERPL-MCNC: 97 MG/DL

## 2024-10-24 ENCOUNTER — OFFICE VISIT (OUTPATIENT)
Dept: INTERNAL MEDICINE CLINIC | Facility: OTHER | Age: 89
End: 2024-10-24
Payer: COMMERCIAL

## 2024-10-24 ENCOUNTER — NURSE TRIAGE (OUTPATIENT)
Age: 89
End: 2024-10-24

## 2024-10-24 VITALS
WEIGHT: 124 LBS | SYSTOLIC BLOOD PRESSURE: 134 MMHG | OXYGEN SATURATION: 98 % | DIASTOLIC BLOOD PRESSURE: 68 MMHG | TEMPERATURE: 98 F | HEART RATE: 77 BPM | BODY MASS INDEX: 21.97 KG/M2

## 2024-10-24 DIAGNOSIS — R35.0 URINARY FREQUENCY: ICD-10-CM

## 2024-10-24 DIAGNOSIS — G47.00 INSOMNIA, UNSPECIFIED TYPE: ICD-10-CM

## 2024-10-24 DIAGNOSIS — N30.01 ACUTE CYSTITIS WITH HEMATURIA: Primary | ICD-10-CM

## 2024-10-24 LAB
SL AMB  POCT GLUCOSE, UA: NEGATIVE
SL AMB LEUKOCYTE ESTERASE,UA: NORMAL
SL AMB POCT BILIRUBIN,UA: NEGATIVE
SL AMB POCT BLOOD,UA: NORMAL
SL AMB POCT CLARITY,UA: NORMAL
SL AMB POCT COLOR,UA: YELLOW
SL AMB POCT KETONES,UA: NORMAL
SL AMB POCT NITRITE,UA: NEGATIVE
SL AMB POCT PH,UA: 6
SL AMB POCT SPECIFIC GRAVITY,UA: 1.02
SL AMB POCT URINE PROTEIN: NORMAL
SL AMB POCT UROBILINOGEN: NORMAL

## 2024-10-24 PROCEDURE — 87077 CULTURE AEROBIC IDENTIFY: CPT | Performed by: NURSE PRACTITIONER

## 2024-10-24 PROCEDURE — 81003 URINALYSIS AUTO W/O SCOPE: CPT | Performed by: NURSE PRACTITIONER

## 2024-10-24 PROCEDURE — 87086 URINE CULTURE/COLONY COUNT: CPT | Performed by: NURSE PRACTITIONER

## 2024-10-24 PROCEDURE — G2211 COMPLEX E/M VISIT ADD ON: HCPCS | Performed by: NURSE PRACTITIONER

## 2024-10-24 PROCEDURE — 99213 OFFICE O/P EST LOW 20 MIN: CPT | Performed by: NURSE PRACTITIONER

## 2024-10-24 PROCEDURE — 87186 SC STD MICRODIL/AGAR DIL: CPT | Performed by: NURSE PRACTITIONER

## 2024-10-24 RX ORDER — LORAZEPAM 0.5 MG/1
0.5 TABLET ORAL
Qty: 30 TABLET | Refills: 0 | Status: SHIPPED | OUTPATIENT
Start: 2024-10-24

## 2024-10-24 RX ORDER — CEPHALEXIN 500 MG/1
500 CAPSULE ORAL EVERY 6 HOURS SCHEDULED
Qty: 20 CAPSULE | Refills: 0 | Status: SHIPPED | OUTPATIENT
Start: 2024-10-24 | End: 2024-10-29

## 2024-10-24 RX ORDER — LORAZEPAM 0.5 MG/1
0.5 TABLET ORAL AS NEEDED
Qty: 30 TABLET | Refills: 0 | Status: SHIPPED | OUTPATIENT
Start: 2024-10-24 | End: 2024-10-24

## 2024-10-24 NOTE — TELEPHONE ENCOUNTER
"Patient called in to ask for OV for UTI. She has frequency and burning which is worse as she lays in bed. OV today 1140    Reason for Disposition   Urinating more frequently than usual (i.e., frequency) OR new-onset of the feeling of an urgent need to urinate (i.e., urgency)    Answer Assessment - Initial Assessment Questions  1. SYMPTOM: \"What's the main symptom you're concerned about?\" (e.g., frequency, incontinence)      Frequency   2. ONSET: \"When did the  frequency start?\"      A few days ago  3. PAIN: \"Is there any pain?\" If Yes, ask: \"How bad is it?\" (Scale: 1-10; mild, moderate, severe)      yes  4. CAUSE: \"What do you think is causing the symptoms?\"      UTI  5. OTHER SYMPTOMS: \"Do you have any other symptoms?\" (e.g., blood in urine, fever, flank pain, pain with urination)      Arms tingle  6. PREGNANCY: \"Is there any chance you are pregnant?\" \"When was your last menstrual period?\"          Protocols used: Urinary Symptoms-Adult-[place]    "

## 2024-10-24 NOTE — ASSESSMENT & PLAN NOTE
Urine dip +moderate blood, large leuks  Start cephalexin 4x a day for 5 days   Increase water intake  Will send urine for culture and follow results

## 2024-10-24 NOTE — PROGRESS NOTES
Assessment/Plan:    Problem List Items Addressed This Visit       Insomnia     Requesting refill for lorazepam which she uses prn  PDMP queried and okay to fill          Relevant Medications    LORazepam (ATIVAN) 0.5 mg tablet    Acute cystitis with hematuria - Primary     Urine dip +moderate blood, large leuks  Start cephalexin 4x a day for 5 days   Increase water intake  Will send urine for culture and follow results           Relevant Medications    cephalexin (KEFLEX) 500 mg capsule    Other Relevant Orders    Urine culture     Other Visit Diagnoses       Urinary frequency        Relevant Orders    POCT urine dip auto non-scope                 M*Modal software was used to dictate this note.  It may contain errors with dictating incorrect words or incorrect spelling. Please contact the provider directly with any questions.    Subjective:      Patient ID: Fallon Proctor is a 89 y.o. female.    HPI    Patient presents today with concern for UTI. Symptoms started 2 days ago. Symptoms include abnormal bladder sensation and tingling in her lower arms. Urianry frequency every 10 minutes and then decreased urine.     The following portions of the patient's history were reviewed and updated as appropriate: allergies, current medications, past family history, past medical history, past social history, past surgical history, and problem list.    Review of Systems   Constitutional:  Negative for chills and fever.   Gastrointestinal:  Negative for abdominal pain and nausea.   Genitourinary:  Positive for frequency and pelvic pain. Negative for difficulty urinating and hematuria.         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)     Last Assessed:3/18/2014         Current Outpatient Medications:     acetaminophen (TYLENOL) 500 mg tablet, Take 1 tablet (500 mg total) by mouth every 4 (four) hours as needed (knee and back pain), Disp: 90 tablet, Rfl: 1    amLODIPine (NORVASC) 5 mg tablet, Take 1 tablet (5 mg total) by mouth daily, Disp: 90 tablet, Rfl: 1    atorvastatin (LIPITOR) 20 mg tablet, Take 1 tablet (20 mg total) by mouth daily, Disp: 90 tablet, Rfl: 1    cephalexin (KEFLEX) 500 mg capsule, Take 1 capsule (500 mg total) by mouth every 6 (six) hours for 5 days, Disp: 20 capsule, Rfl: 0    Cholecalciferol (Vitamin D3) 1000 units CAPS, Take 1 capsule by mouth if needed, Disp: , Rfl:     Cranberry-Vitamin C-Probiotic (AZO CRANBERRY PO), Take by mouth as needed, Disp: , Rfl:     LORazepam (ATIVAN) 0.5 mg tablet, Take 1 tablet (0.5 mg total) by mouth as needed (help sleep), Disp: 30 tablet, Rfl: 0    omeprazole (PriLOSEC) 20 mg delayed release capsule, Take 1 capsule (20 mg total) by mouth daily, Disp: 90 capsule, Rfl: 1    sertraline (ZOLOFT) 50 mg tablet, Take 1 tablet (50 mg total) by mouth daily, Disp: 90 tablet, Rfl: 1    acetaminophen (TYLENOL) 500 mg tablet, Take 1 tablet (500 mg total) by mouth every 6 (six) hours as needed for mild pain or moderate pain (Patient not taking: Reported on 10/8/2024), Disp: 30 tablet, Rfl: 0    lidocaine (LIDODERM) 5 %, Apply 1 patch topically over 12 hours every 24 hours Remove & Discard patch within 12 hours or as directed by MD (Patient not taking: Reported on 10/8/2024), Disp: 15 patch, Rfl: 0    Current Facility-Administered Medications:     denosumab (PROLIA) subcutaneous injection 60 mg, 60 mg, Subcutaneous, Q6 Months, Wali Byrd MD, 60 mg at 09/18/23 6590    Allergies   Allergen Reactions    Flexeril [Cyclobenzaprine] Syncope    Daypro [Oxaprozin]      Unknown allergic reaction     Minocycline Other (See Comments)     Loss of taste       Social History   Past Surgical History:   Procedure Laterality Date    APPENDECTOMY      CATARACT EXTRACTION, BILATERAL      HYSTERECTOMY      INCISIONAL BREAST BIOPSY       Family History   Problem Relation Age of Onset    Diabetes Mother     Varicose Veins Mother     No Known Problems Sister     No Known Problems Daughter     No Known Problems Daughter     No Known Problems Maternal Grandmother     No Known Problems Maternal Grandfather     No Known Problems Paternal Grandmother     No Known Problems Paternal Grandfather     Diabetes Brother        Objective:  /68 (BP Location: Left arm, Patient Position: Sitting, Cuff Size: Standard)   Pulse 77   Temp 98 °F (36.7 °C) (Temporal)   Wt 56.2 kg (124 lb)   SpO2 98%   BMI 21.97 kg/m²      Physical Exam  Vitals reviewed.   Constitutional:       General: She is not in acute distress.     Appearance: Normal appearance. She is normal weight. She is not diaphoretic.   HENT:      Head: Normocephalic and atraumatic.   Cardiovascular:      Rate and Rhythm: Normal rate and regular rhythm.      Heart sounds: Normal heart sounds.   Pulmonary:      Effort: Pulmonary effort is normal. No respiratory distress.      Breath sounds: Normal breath sounds. No wheezing, rhonchi or rales.   Abdominal:      Tenderness: There is no abdominal tenderness.   Neurological:      Mental Status: She is alert and oriented to person, place, and time. Mental status is at baseline.   Psychiatric:         Mood and Affect: Mood normal.         Behavior: Behavior normal.

## 2024-10-24 NOTE — TELEPHONE ENCOUNTER
Medication needed max dose- script was updated with max daily dose       Please resend script, thank you

## 2024-10-26 LAB
BACTERIA UR CULT: ABNORMAL
BACTERIA UR CULT: ABNORMAL

## 2024-11-08 ENCOUNTER — OFFICE VISIT (OUTPATIENT)
Dept: OBGYN CLINIC | Facility: MEDICAL CENTER | Age: 89
End: 2024-11-08
Payer: COMMERCIAL

## 2024-11-08 ENCOUNTER — APPOINTMENT (OUTPATIENT)
Dept: RADIOLOGY | Facility: MEDICAL CENTER | Age: 89
End: 2024-11-08
Payer: COMMERCIAL

## 2024-11-08 VITALS
SYSTOLIC BLOOD PRESSURE: 133 MMHG | HEART RATE: 55 BPM | HEIGHT: 63 IN | BODY MASS INDEX: 21.97 KG/M2 | DIASTOLIC BLOOD PRESSURE: 72 MMHG | WEIGHT: 124 LBS

## 2024-11-08 DIAGNOSIS — M25.562 LEFT KNEE PAIN, UNSPECIFIED CHRONICITY: ICD-10-CM

## 2024-11-08 DIAGNOSIS — M17.12 PRIMARY OSTEOARTHRITIS OF LEFT KNEE: Primary | ICD-10-CM

## 2024-11-08 DIAGNOSIS — M25.562 ACUTE PAIN OF LEFT KNEE: ICD-10-CM

## 2024-11-08 DIAGNOSIS — Z01.89 ENCOUNTER FOR LOWER EXTREMITY COMPARISON IMAGING STUDY: ICD-10-CM

## 2024-11-08 PROCEDURE — 73560 X-RAY EXAM OF KNEE 1 OR 2: CPT

## 2024-11-08 PROCEDURE — 99213 OFFICE O/P EST LOW 20 MIN: CPT | Performed by: ORTHOPAEDIC SURGERY

## 2024-11-08 PROCEDURE — 73564 X-RAY EXAM KNEE 4 OR MORE: CPT

## 2024-11-08 NOTE — PROGRESS NOTES
Assessment & Plan     1. Primary osteoarthritis of left knee    2. Acute pain of left knee    3. Left knee pain, unspecified chronicity    4. Encounter for lower extremity comparison imaging study      Orders Placed This Encounter   Procedures    XR knee 4+ vw left injury    XR knee 1 or 2 vw right    Ambulatory Referral to Physical Therapy         Patient has moderate left knee osteoarthritis.   Discussed treatment options including continued observation, low impact exercises, bracing, anti-inflammatories, physical therapy, cortisone injection, versus visco injection  Medications: Tylenol up to 3000 mg per day  PT: PT script provided. Patient may go for education on a home exercise program.   Bracing: Continue OTC knee brace.   Activity: Continue activity as tolerated.   Plan for next appt:  as needed      Return if symptoms worsen or fail to improve.    I answered all of the patient's questions during the visit and provided education of the patient's condition during the visit.  The patient verbalized understanding of the information given and agrees with the plan.  This note was dictated using [employer] software.  It may contain errors including improperly dictated words.  Please contact physician directly for any questions.    History of Present Illness   Chief complaint:   Chief Complaint   Patient presents with    Left Knee - Pain       HPI: Fallon Proctor is a 89 y.o. female that c/o left knee pain.      Mechanism of Injury: None  Pain Description: Diffuse knee pain and some pain in her posterior calf  Palliating Factors: Rest, Tylenol, topicals, and bracing  Provoking Factors: Descending stairs  Associated Symptoms: None  Medications: Tylenol  Able to take NSAIDs? If not, why: No, patient age over 75 yo  Physical Therapy or Home Exercises: None  Injections: None  Bracing: Has been wearing a OTC brace with relief  Previous Surgery: None     ROS:    See HPI for musculoskeletal review.   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)     Last Assessed:3/18/2014     Past Surgical History:   Procedure Laterality Date    APPENDECTOMY      CATARACT EXTRACTION, BILATERAL      HYSTERECTOMY      INCISIONAL BREAST BIOPSY       Social History   Social History     Substance and Sexual Activity   Alcohol Use Yes    Comment: socially     Social History     Substance and Sexual Activity   Drug Use No     Social History     Tobacco Use   Smoking Status Never   Smokeless Tobacco Never     Family History:   Family History   Problem Relation Age of Onset    Diabetes Mother     Varicose Veins Mother     No Known Problems Sister     No Known Problems Daughter     No Known Problems Daughter     No Known Problems Maternal Grandmother     No Known Problems Maternal Grandfather     No Known Problems Paternal Grandmother     No Known Problems Paternal Grandfather     Diabetes Brother        Current Outpatient Medications on File Prior to Visit   Medication Sig Dispense Refill    acetaminophen (TYLENOL) 500 mg tablet Take 1 tablet (500 mg total) by mouth every 4 (four) hours as needed (knee and back pain) 90 tablet 1    amLODIPine (NORVASC) 5 mg tablet Take 1 tablet (5 mg total) by mouth daily 90 tablet 1    atorvastatin (LIPITOR) 20 mg tablet Take 1 tablet (20 mg total) by mouth daily 90 tablet 1    Cholecalciferol (Vitamin D3) 1000 units CAPS Take 1 capsule by mouth if needed      Cranberry-Vitamin C-Probiotic (AZO CRANBERRY PO) Take by mouth as needed      LORazepam (ATIVAN) 0.5  mg tablet Take 1 tablet (0.5 mg total) by mouth daily at bedtime as needed (sleep) 30 tablet 0    omeprazole (PriLOSEC) 20 mg delayed release capsule Take 1 capsule (20 mg total) by mouth daily 90 capsule 1    sertraline (ZOLOFT) 50 mg tablet Take 1 tablet (50 mg total) by mouth daily 90 tablet 1    acetaminophen (TYLENOL) 500 mg tablet Take 1 tablet (500 mg total) by mouth every 6 (six) hours as needed for mild pain or moderate pain (Patient not taking: Reported on 10/8/2024) 30 tablet 0    lidocaine (LIDODERM) 5 % Apply 1 patch topically over 12 hours every 24 hours Remove & Discard patch within 12 hours or as directed by MD (Patient not taking: Reported on 10/8/2024) 15 patch 0     Current Facility-Administered Medications on File Prior to Visit   Medication Dose Route Frequency Provider Last Rate Last Admin    denosumab (PROLIA) subcutaneous injection 60 mg  60 mg Subcutaneous Q6 Months Wali Byrd MD   60 mg at 09/18/23 1629     Allergies   Allergen Reactions    Flexeril [Cyclobenzaprine] Syncope    Daypro [Oxaprozin]      Unknown allergic reaction    Minocycline Other (See Comments)     Loss of taste       Current Outpatient Medications on File Prior to Visit   Medication Sig Dispense Refill    acetaminophen (TYLENOL) 500 mg tablet Take 1 tablet (500 mg total) by mouth every 4 (four) hours as needed (knee and back pain) 90 tablet 1    amLODIPine (NORVASC) 5 mg tablet Take 1 tablet (5 mg total) by mouth daily 90 tablet 1    atorvastatin (LIPITOR) 20 mg tablet Take 1 tablet (20 mg total) by mouth daily 90 tablet 1    Cholecalciferol (Vitamin D3) 1000 units CAPS Take 1 capsule by mouth if needed      Cranberry-Vitamin C-Probiotic (AZO CRANBERRY PO) Take by mouth as needed      LORazepam (ATIVAN) 0.5 mg tablet Take 1 tablet (0.5 mg total) by mouth daily at bedtime as needed (sleep) 30 tablet 0    omeprazole (PriLOSEC) 20 mg delayed release capsule Take 1 capsule (20 mg total) by mouth daily 90 capsule 1     "sertraline (ZOLOFT) 50 mg tablet Take 1 tablet (50 mg total) by mouth daily 90 tablet 1    acetaminophen (TYLENOL) 500 mg tablet Take 1 tablet (500 mg total) by mouth every 6 (six) hours as needed for mild pain or moderate pain (Patient not taking: Reported on 10/8/2024) 30 tablet 0    lidocaine (LIDODERM) 5 % Apply 1 patch topically over 12 hours every 24 hours Remove & Discard patch within 12 hours or as directed by MD (Patient not taking: Reported on 10/8/2024) 15 patch 0     Current Facility-Administered Medications on File Prior to Visit   Medication Dose Route Frequency Provider Last Rate Last Admin    denosumab (PROLIA) subcutaneous injection 60 mg  60 mg Subcutaneous Q6 Months Wali Byrd MD   60 mg at 09/18/23 1629       Objective   Vitals: Blood pressure 133/72, pulse 55, height 5' 3\" (1.6 m), weight 56.2 kg (124 lb).,Body mass index is 21.97 kg/m².    PE:  AAOx 3  WDWN  Hearing intact, no drainage from eyes  Regular rate  no audible wheezing  no abdominal distension  LE compartments soft, skin intact    leftknee:    Appearance:  No  swelling   No  ecchymosis  No  obvious joint deformity   Trace effusion   Palpation/Tenderness:  No  TTP over medial joint line  Yes  TTP over lateral joint line   No  TTP over patella  No  TTP over patellar tendon  Yes  TTP over pes anserine bursa  Active Range of Motion:  AROM: 0-120  Special Tests:  Valgus Stress Test: negative  Varus Stress Test: negative  Medial [facility]: negative  Lateral [facility]: negative  [facility]: negative  Anterior/ Posterior Drawer: negative      Imaging Studies:  Results Review Statement: I personally reviewed the following image studies in PACS and associated radiology reports: xray(s). My interpretation of the radiology images/reports is:   X-rayleft knee: moderate degenerative changes present      Procedures  No procedures performed today    Scribe Attestation      I,:  Ness Crum am acting as a scribe while in the presence of the " attending physician.:       I,:  Maxine Short, DO personally performed the services described in this documentation    as scribed in my presence.:

## 2024-11-23 PROBLEM — N30.01 ACUTE CYSTITIS WITH HEMATURIA: Status: RESOLVED | Noted: 2024-10-24 | Resolved: 2024-11-23

## 2024-12-06 DIAGNOSIS — E78.2 MIXED HYPERLIPIDEMIA: ICD-10-CM

## 2024-12-06 RX ORDER — ATORVASTATIN CALCIUM 20 MG/1
20 TABLET, FILM COATED ORAL DAILY
Qty: 90 TABLET | Refills: 1 | OUTPATIENT
Start: 2024-12-06

## 2024-12-30 ENCOUNTER — OFFICE VISIT (OUTPATIENT)
Dept: INTERNAL MEDICINE CLINIC | Facility: OTHER | Age: 89
End: 2024-12-30
Payer: COMMERCIAL

## 2024-12-30 VITALS
BODY MASS INDEX: 21.61 KG/M2 | HEART RATE: 70 BPM | TEMPERATURE: 98.9 F | OXYGEN SATURATION: 98 % | SYSTOLIC BLOOD PRESSURE: 120 MMHG | DIASTOLIC BLOOD PRESSURE: 60 MMHG | WEIGHT: 122 LBS

## 2024-12-30 DIAGNOSIS — H93.8X2 BLOCKED EAR, LEFT: ICD-10-CM

## 2024-12-30 DIAGNOSIS — M54.50 ACUTE LEFT-SIDED LOW BACK PAIN WITHOUT SCIATICA: Primary | ICD-10-CM

## 2024-12-30 PROCEDURE — G2211 COMPLEX E/M VISIT ADD ON: HCPCS | Performed by: INTERNAL MEDICINE

## 2024-12-30 PROCEDURE — 99213 OFFICE O/P EST LOW 20 MIN: CPT | Performed by: INTERNAL MEDICINE

## 2024-12-30 RX ORDER — AZELASTINE 1 MG/ML
1 SPRAY, METERED NASAL 2 TIMES DAILY
Qty: 30 ML | Refills: 1 | Status: SHIPPED | OUTPATIENT
Start: 2024-12-30

## 2024-12-30 NOTE — PROGRESS NOTES
"Name: Fallon Proctor      : 1935      MRN: [id number]  Encounter Provider: Joel Golden MD  Encounter Date: 2024   Encounter department: [facility]  :  Assessment & Plan  Acute left-sided low back pain without sciatica  1 week of left lumbar back and hip pain with no clear inciting factor. Pain is dull/achy and improving prior to today's visit. No red flag symptoms. Physical exam demonstrates a normal gait, negative straight leg testing, no spinal or paraspinal tenderness, no hip bursitis.   Suspect lumbar pain and muscle tightness is related to paraspinal muscle strain. As is improving without treatment will continue with conservative management     Provided with handout for back exercises  Advised to use heat and tylenol prn, avoid NSAIDs  Avoid lifting heavy objects  If symptoms do not improve, can consider PT and/or hip Xray  Blocked ear, left  Intermittently blocked left ear with eating, has been using flonase OTC without relief though does not use daily  Ear exam is benign b/l, suspect this may have an allergen etiology    Orders:    azelastine (ASTELIN) 0.1 % nasal spray; 1 spray into each nostril 2 (two) times a day Use in each nostril as directed           History of Present Illness     89 year old female with history of anxiety, osteoporosis, osteoarthritis, HTN, CKD 2/3, prediabetes, who presents to [facility] for sick visit.     She reports a 1 week history of left lumbar and hip pain.  She is not certain how exactly the pain came about.  Pain is worse with certain sitting positions and worsened over the course of the last week but improved over the past 24 to 48 hours without any specific treatment. At the time of her visit the pain has transitioned more to a \"tightness.\" She has had no muscle weakness or difficulty completing ADLs. She is concerned that this pain could be her kidney, though she denies fever, chills, malaise, urinary frequency or " dysuria.     She also reports a 1 year history of intermittent left ear blocking with eating. She is taking flonase but it doesn't help. Reports that the symptoms are very annoying. She otherwise does not have any nasal or ear pain or discharge. No changes in hearing in the right or left ear.           Review of Systems   Constitutional:  Negative for chills and fever.   HENT:  Negative for ear pain and sore throat.    Eyes:  Negative for pain and visual disturbance.   Respiratory:  Negative for cough and shortness of breath.    Cardiovascular:  Negative for chest pain and palpitations.   Gastrointestinal:  Negative for abdominal pain and vomiting.   Genitourinary:  Negative for dysuria and hematuria.   Musculoskeletal:  Positive for back pain. Negative for arthralgias.   Skin:  Negative for color change and rash.   Neurological:  Negative for seizures and syncope.   All other systems reviewed and are negative.      Objective   /60 (BP Location: Left arm, Patient Position: Sitting, Cuff Size: Standard)   Pulse 70   Temp 98.9 °F (37.2 °C) (Temporal)   Wt 55.3 kg (122 lb)   SpO2 98%   BMI 21.61 kg/m²      Physical Exam  Vitals and nursing note reviewed.   Constitutional:       General: She is not in acute distress.     Appearance: She is well-developed.   HENT:      Head: Normocephalic and atraumatic.   Eyes:      General: No scleral icterus.     Conjunctiva/sclera: Conjunctivae normal.   Cardiovascular:      Rate and Rhythm: Normal rate and regular rhythm.      Heart sounds: No murmur heard.  Pulmonary:      Effort: Pulmonary effort is normal. No respiratory distress.      Breath sounds: Normal breath sounds.   Abdominal:      Palpations: Abdomen is soft.      Tenderness: There is no abdominal tenderness.   Musculoskeletal:         General: No swelling.      Cervical back: Neck supple.      Right lower leg: No edema.      Left lower leg: No edema.      Comments: Gait is normal  There is no spinal or  paraspinal TTP  Straight leg raise is negative bilaterally  There is no hip tenderness to palpation bilaterally   Skin:     General: Skin is warm and dry.      Capillary Refill: Capillary refill takes less than 2 seconds.   Neurological:      Mental Status: She is alert and oriented to person, place, and time.   Psychiatric:         Mood and Affect: Mood normal.

## 2025-01-23 ENCOUNTER — TELEPHONE (OUTPATIENT)
Age: 89
End: 2025-01-23

## 2025-01-23 NOTE — TELEPHONE ENCOUNTER
Please advise if any labs are due prior to patients apt 01/30th, if so please inform patient thank you

## 2025-01-30 ENCOUNTER — OFFICE VISIT (OUTPATIENT)
Dept: INTERNAL MEDICINE CLINIC | Facility: OTHER | Age: 89
End: 2025-01-30
Payer: COMMERCIAL

## 2025-01-30 VITALS
OXYGEN SATURATION: 98 % | HEIGHT: 63 IN | WEIGHT: 125.2 LBS | DIASTOLIC BLOOD PRESSURE: 50 MMHG | BODY MASS INDEX: 22.18 KG/M2 | HEART RATE: 71 BPM | TEMPERATURE: 98 F | SYSTOLIC BLOOD PRESSURE: 126 MMHG

## 2025-01-30 DIAGNOSIS — K21.9 GASTROESOPHAGEAL REFLUX DISEASE WITHOUT ESOPHAGITIS: ICD-10-CM

## 2025-01-30 DIAGNOSIS — E55.9 VITAMIN D DEFICIENCY: ICD-10-CM

## 2025-01-30 DIAGNOSIS — R73.03 PREDIABETES: ICD-10-CM

## 2025-01-30 DIAGNOSIS — G47.00 INSOMNIA, UNSPECIFIED TYPE: ICD-10-CM

## 2025-01-30 DIAGNOSIS — I10 ESSENTIAL HYPERTENSION: Primary | ICD-10-CM

## 2025-01-30 DIAGNOSIS — F41.9 ANXIETY: ICD-10-CM

## 2025-01-30 DIAGNOSIS — M54.42 CHRONIC LEFT-SIDED LOW BACK PAIN WITH LEFT-SIDED SCIATICA: ICD-10-CM

## 2025-01-30 DIAGNOSIS — G89.29 CHRONIC LEFT-SIDED LOW BACK PAIN WITH LEFT-SIDED SCIATICA: ICD-10-CM

## 2025-01-30 DIAGNOSIS — E78.2 MIXED HYPERLIPIDEMIA: ICD-10-CM

## 2025-01-30 DIAGNOSIS — N18.31 STAGE 3A CHRONIC KIDNEY DISEASE (HCC): ICD-10-CM

## 2025-01-30 PROCEDURE — 99214 OFFICE O/P EST MOD 30 MIN: CPT | Performed by: INTERNAL MEDICINE

## 2025-01-30 PROCEDURE — G2211 COMPLEX E/M VISIT ADD ON: HCPCS | Performed by: INTERNAL MEDICINE

## 2025-01-30 RX ORDER — ATORVASTATIN CALCIUM 20 MG/1
20 TABLET, FILM COATED ORAL DAILY
Qty: 90 TABLET | Refills: 1 | Status: SHIPPED | OUTPATIENT
Start: 2025-01-30

## 2025-01-30 RX ORDER — LORAZEPAM 0.5 MG/1
0.5 TABLET ORAL
Qty: 30 TABLET | Refills: 0 | Status: SHIPPED | OUTPATIENT
Start: 2025-01-30

## 2025-01-30 RX ORDER — AMLODIPINE BESYLATE 5 MG/1
5 TABLET ORAL DAILY
Qty: 90 TABLET | Refills: 1 | Status: SHIPPED | OUTPATIENT
Start: 2025-01-30

## 2025-01-30 NOTE — ASSESSMENT & PLAN NOTE
Continue with present dose of statin along with low-fat diet.  Will check lipid profile before next visit  Orders:    atorvastatin (LIPITOR) 20 mg tablet; Take 1 tablet (20 mg total) by mouth daily    Lipid Panel with Direct LDL reflex; Future

## 2025-01-30 NOTE — ASSESSMENT & PLAN NOTE
Doing well on present dose of omeprazole  Orders:    omeprazole (PriLOSEC) 20 mg delayed release capsule; Take 1 capsule (20 mg total) by mouth daily    CBC; Future

## 2025-01-30 NOTE — ASSESSMENT & PLAN NOTE
Lab Results   Component Value Date    EGFR 70 10/22/2024    EGFR 75 03/22/2024    EGFR 81 12/04/2023    CREATININE 0.76 10/22/2024    CREATININE 0.76 03/22/2024    CREATININE 0.72 12/04/2023     Renal function stable.  Will continue to monitor.  Continue with adequate oral hydration.  Orders:    Comprehensive metabolic panel; Future

## 2025-01-30 NOTE — ASSESSMENT & PLAN NOTE
Using lorazepam 0.5 mg on as needed basis  Orders:    LORazepam (ATIVAN) 0.5 mg tablet; Take 1 tablet (0.5 mg total) by mouth daily at bedtime as needed (sleep)

## 2025-01-30 NOTE — ASSESSMENT & PLAN NOTE
Doing well with sertraline 50 mg daily and lorazepam 0.5 mg as needed as needed  Orders:    sertraline (ZOLOFT) 50 mg tablet; Take 1 tablet (50 mg total) by mouth daily

## 2025-01-30 NOTE — ASSESSMENT & PLAN NOTE
Blood pressure is well-controlled on present regimen.  Will continue to monitor.  Orders:    amLODIPine (NORVASC) 5 mg tablet; Take 1 tablet (5 mg total) by mouth daily

## 2025-01-30 NOTE — PROGRESS NOTES
Name: Fallon Proctor      : 1935      MRN: [id number]  Encounter Provider: Wali Byrd MD  Encounter Date: 2025   Encounter department: [facility]  :  Assessment & Plan  Essential hypertension  Blood pressure is well-controlled on present regimen.  Will continue to monitor.  Orders:    amLODIPine (NORVASC) 5 mg tablet; Take 1 tablet (5 mg total) by mouth daily    Mixed hyperlipidemia  Continue with present dose of statin along with low-fat diet.  Will check lipid profile before next visit  Orders:    atorvastatin (LIPITOR) 20 mg tablet; Take 1 tablet (20 mg total) by mouth daily    Lipid Panel with Direct LDL reflex; Future    Gastroesophageal reflux disease without esophagitis  Doing well on present dose of omeprazole  Orders:    omeprazole (PriLOSEC) 20 mg delayed release capsule; Take 1 capsule (20 mg total) by mouth daily    CBC; Future    Anxiety  Doing well with sertraline 50 mg daily and lorazepam 0.5 mg as needed as needed  Orders:    sertraline (ZOLOFT) 50 mg tablet; Take 1 tablet (50 mg total) by mouth daily    Insomnia, unspecified type  Using lorazepam 0.5 mg on as needed basis  Orders:    LORazepam (ATIVAN) 0.5 mg tablet; Take 1 tablet (0.5 mg total) by mouth daily at bedtime as needed (sleep)    Stage 3a chronic kidney disease (HCC)  Lab Results   Component Value Date    EGFR 70 10/22/2024    EGFR 75 2024    EGFR 81 2023    CREATININE 0.76 10/22/2024    CREATININE 0.76 2024    CREATININE 0.72 2023     Renal function stable.  Will continue to monitor.  Continue with adequate oral hydration.  Orders:    Comprehensive metabolic panel; Future    Vitamin D deficiency  Continue with vitamin D3 supplementation       Prediabetes  Continue with low sugar/low-carb diet.  Will check hemoglobin A1c before next visit  Orders:    Hemoglobin A1C; Future    Chronic left-sided low back pain with left-sided sciatica  No tenderness on  "lumbar spine examination no flank tenderness.  No limitation of hip movements.  Advised to take Tylenol as needed.  If no improvement in the next week or so we will consider x-rays and PT referral.              History of Present Illness   Patient is here for follow-up.  No blood work prior to this visit.  Accompanied by daughter.  Still complaining of left flank/left lower lumbar pain off-and-on especially with increased activity.  Sometimes radiation to thigh area.  No frequency or dysuria.  No fever chills no nausea vomiting.    Back Pain  Pertinent negatives include no abdominal pain, chest pain, dysuria, fever, headaches or weakness.     Review of Systems   Constitutional:  Negative for fatigue and fever.   HENT:  Negative for congestion, ear discharge, ear pain, postnasal drip, sinus pressure, sore throat, tinnitus and trouble swallowing.    Eyes:  Negative for discharge, itching and visual disturbance.   Respiratory:  Negative for cough and shortness of breath.    Cardiovascular:  Negative for chest pain and palpitations.   Gastrointestinal:  Negative for abdominal pain, diarrhea, nausea and vomiting.   Endocrine: Negative for cold intolerance and polyuria.   Genitourinary:  Negative for difficulty urinating, dysuria and urgency.   Musculoskeletal:  Positive for back pain. Negative for arthralgias and neck pain.   Skin:  Negative for rash.   Allergic/Immunologic: Negative for environmental allergies.   Neurological:  Negative for dizziness, weakness and headaches.   Psychiatric/Behavioral:  Negative for agitation and behavioral problems. The patient is not nervous/anxious.        Objective   /50 (BP Location: Left arm, Patient Position: Sitting, Cuff Size: Adult)   Pulse 71   Temp 98 °F (36.7 °C) (Oral)   Ht 5' 3\" (1.6 m)   Wt 56.8 kg (125 lb 3.2 oz)   SpO2 98% Comment: ra  BMI 22.18 kg/m²      Physical Exam  Constitutional:       General: She is not in acute distress.     Appearance: She is " well-developed.   HENT:      Head: Normocephalic.      Right Ear: External ear normal. There is no impacted cerumen.      Left Ear: External ear normal. There is no impacted cerumen.      Nose: No rhinorrhea.      Mouth/Throat:      Pharynx: No oropharyngeal exudate or posterior oropharyngeal erythema.   Eyes:      General: No scleral icterus.     Pupils: Pupils are equal, round, and reactive to light.   Neck:      Thyroid: No thyromegaly.      Trachea: No tracheal deviation.   Cardiovascular:      Rate and Rhythm: Normal rate and regular rhythm.      Heart sounds: Normal heart sounds. No murmur heard.  Pulmonary:      Effort: Pulmonary effort is normal. No respiratory distress.      Breath sounds: Normal breath sounds. No rales.   Chest:      Chest wall: No tenderness.   Abdominal:      General: Bowel sounds are normal.      Palpations: Abdomen is soft. There is no mass.      Tenderness: There is no abdominal tenderness.   Musculoskeletal:         General: No tenderness. Normal range of motion.      Cervical back: Normal range of motion and neck supple. No rigidity.      Right lower leg: No edema.      Left lower leg: No edema.   Lymphadenopathy:      Cervical: No cervical adenopathy.   Skin:     General: Skin is warm.      Findings: No erythema or rash.   Neurological:      Mental Status: She is alert and oriented to person, place, and time.      Cranial Nerves: No cranial nerve deficit.   Psychiatric:         Mood and Affect: Mood normal.         Behavior: Behavior normal.

## 2025-01-30 NOTE — ASSESSMENT & PLAN NOTE
Continue with low sugar/low-carb diet.  Will check hemoglobin A1c before next visit  Orders:    Hemoglobin A1C; Future

## 2025-03-06 ENCOUNTER — TELEPHONE (OUTPATIENT)
Age: 89
End: 2025-03-06

## 2025-03-06 NOTE — TELEPHONE ENCOUNTER
Ana Maria from [facility] called wanting to inquire about Fallon and her prollia shot. Wanted to know if it was a buy and bill. Stated that she had a phone visit today with Fallon and she stated that she has a large copay of a couple hundred dollars when she comes in for this shot.    Ana Maria wanted to know if it would be a cheaper option for Fallon to pick it up at the pharmacy and then come to the office to have it administered.     Please advise out to Ana Maria [phone] to discuss further.

## 2025-03-10 NOTE — TELEPHONE ENCOUNTER
Left a message for Ana Maria to contact myself at [phone].  Stated in the message that the patient was told about how much it would cost her out of pocket when she started this injection.  I also told her to contact her insurance to find out what it would cost for her through the part D prescription plan for the Prolia injection.  I stated that you are calling from the insurance company and should know this information in regards to what the Prolia injection would cost her through the Part D plan.  Stated to find that out since you are the insurance company and contact the patient on this matter.  We are the doctor's office not the insurance to find out what it would cost her.  Stated to contact the patient on this and let her know what the outcome is and then have her call us back on this or call back ourselves.

## 2025-03-11 NOTE — TELEPHONE ENCOUNTER
Ana Maria called for Ema, Ana Maria would like to speak to Ema pertaining the previous conversation. Ana Maria's contact number is [phone]

## 2025-04-16 ENCOUNTER — OFFICE VISIT (OUTPATIENT)
Dept: INTERNAL MEDICINE CLINIC | Facility: OTHER | Age: 89
End: 2025-04-16
Payer: COMMERCIAL

## 2025-04-16 ENCOUNTER — NURSE TRIAGE (OUTPATIENT)
Age: 89
End: 2025-04-16

## 2025-04-16 VITALS
BODY MASS INDEX: 21.97 KG/M2 | RESPIRATION RATE: 20 BRPM | TEMPERATURE: 98.6 F | OXYGEN SATURATION: 99 % | HEIGHT: 63 IN | WEIGHT: 124 LBS | DIASTOLIC BLOOD PRESSURE: 58 MMHG | HEART RATE: 71 BPM | SYSTOLIC BLOOD PRESSURE: 112 MMHG

## 2025-04-16 DIAGNOSIS — R05.1 ACUTE COUGH: ICD-10-CM

## 2025-04-16 DIAGNOSIS — H93.8X2 BLOCKED EAR, LEFT: ICD-10-CM

## 2025-04-16 DIAGNOSIS — I10 ESSENTIAL HYPERTENSION: Primary | ICD-10-CM

## 2025-04-16 DIAGNOSIS — J40 BRONCHITIS: ICD-10-CM

## 2025-04-16 LAB
SARS-COV-2 AG UPPER RESP QL IA: NEGATIVE
VALID CONTROL: NORMAL

## 2025-04-16 PROCEDURE — G2211 COMPLEX E/M VISIT ADD ON: HCPCS | Performed by: NURSE PRACTITIONER

## 2025-04-16 PROCEDURE — 99213 OFFICE O/P EST LOW 20 MIN: CPT | Performed by: NURSE PRACTITIONER

## 2025-04-16 PROCEDURE — 87811 SARS-COV-2 COVID19 W/OPTIC: CPT | Performed by: NURSE PRACTITIONER

## 2025-04-16 RX ORDER — AZELASTINE 1 MG/ML
1 SPRAY, METERED NASAL 2 TIMES DAILY
Start: 2025-04-16 | End: 2025-04-24 | Stop reason: SDUPTHER

## 2025-04-16 RX ORDER — AMLODIPINE BESYLATE 2.5 MG/1
2.5 TABLET ORAL DAILY
Qty: 30 TABLET | Refills: 1 | Status: SHIPPED | OUTPATIENT
Start: 2025-04-16

## 2025-04-16 RX ORDER — BENZONATATE 100 MG/1
100 CAPSULE ORAL 3 TIMES DAILY PRN
Qty: 20 CAPSULE | Refills: 0 | Status: SHIPPED | OUTPATIENT
Start: 2025-04-16 | End: 2025-04-24 | Stop reason: SDUPTHER

## 2025-04-16 RX ORDER — PREDNISONE 20 MG/1
40 TABLET ORAL DAILY
Qty: 10 TABLET | Refills: 0 | Status: SHIPPED | OUTPATIENT
Start: 2025-04-16 | End: 2025-04-21

## 2025-04-16 NOTE — TELEPHONE ENCOUNTER
"  FOLLOW UP: office visit later today    REASON FOR CONVERSATION: Loss of Consciousness    SYMPTOMS: felt faint, chest congestion, nasal congestion, sore throat    OTHER: BP 85/52 when she felt faint, 132/82 at time of triage call    DISPOSITION: See Today in Office        Reason for Disposition   MODERATE dizziness (e.g., interferes with normal activities)  (Exception: Dizziness caused by heat exposure, sudden standing, or poor fluid intake.)    Answer Assessment - Initial Assessment Questions  1. DESCRIPTION: \"Describe your dizziness.\"      Felt unbalanced like she was going to pass out  2. LIGHTHEADED: \"Do you feel lightheaded?\" (e.g., somewhat faint, woozy, weak upon standing)      yes  3. VERTIGO: \"Do you feel like either you or the room is spinning or tilting?\" (i.e., vertigo)      no  4. SEVERITY: \"How bad is it?\"  \"Do you feel like you are going to faint?\" \"Can you stand and walk?\"      Patient had to sit down with her head between her knees  5. ONSET:  \"When did the dizziness begin?\"      While cooking breakfast  6. AGGRAVATING FACTORS: \"Does anything make it worse?\" (e.g., standing, change in head position)      Felt better after short time seated  7. HEART RATE: \"Can you tell me your heart rate?\" \"How many beats in 15 seconds?\"  (Note: Not all patients can do this.)        BP was 85/52  8. CAUSE: \"What do you think is causing the dizziness?\" (e.g., decreased fluids or food, diarrhea, emotional distress, heat exposure, new medicine, sudden standing, vomiting; unknown)      flu  9. RECURRENT SYMPTOM: \"Have you had dizziness before?\" If Yes, ask: \"When was the last time?\" \"What happened that time?\"      Did not say  10. OTHER SYMPTOMS: \"Do you have any other symptoms?\" (e.g., fever, chest pain, vomiting, diarrhea, bleeding)        Chest and nasal congestion, sore throat    Protocols used: Dizziness-Adult-[place]    "

## 2025-04-16 NOTE — PATIENT INSTRUCTIONS
Start prednisone 2 tablets daily for 5 days   Start plain mucinex 1200 twice daily  Start tessalon perles every 8 hours as needed for cough  Start azelastine nasal spray     Decrease amlodipine to 2.5mg daily.     Notify office for any worsening

## 2025-04-16 NOTE — TELEPHONE ENCOUNTER
"Patient states she did not actually faint. She states she's a \"fainter.\" This is nothing new for her.   She would like to be seen for her cold symptoms, runny nose, cough.        "

## 2025-04-16 NOTE — TELEPHONE ENCOUNTER
If patient had episode of syncope/loss of consciousness this morning please advise her to go to the ED

## 2025-04-16 NOTE — PROGRESS NOTES
Name: Fallon Proctor      : 1935      MRN: [id number]  Encounter Provider: SOPHIE Hernandez  Encounter Date: 2025   Encounter department: [facility]  :  Assessment & Plan  Essential hypertension  BP beyond goal with episodes of hypotension and near syncope  Decrease amlodipine to 2.5mg daily  Monitor blood pressure daily at home  Follow up with PCP as scheduled in 3 weeks  Orders:    amLODIPine (NORVASC) 2.5 mg tablet; Take 1 tablet (2.5 mg total) by mouth daily    Bronchitis  Start prednisone 40mg daily x 5 days   Start tessalon perles every 8 hours for cough  Mucinex 1200mg twice daily  Restart azelastine nasal spray  Follow up in 1 week for recheck, sooner for any worsening symptoms.   Orders:    predniSONE 20 mg tablet; Take 2 tablets (40 mg total) by mouth daily for 5 days    benzonatate (TESSALON PERLES) 100 mg capsule; Take 1 capsule (100 mg total) by mouth 3 (three) times a day as needed for cough    Blocked ear, left    Orders:    azelastine (ASTELIN) 0.1 % nasal spray; 1 spray into each nostril 2 (two) times a day Use in each nostril as directed    Acute cough    Orders:    POCT Rapid Covid Ag           History of Present Illness   Cough  Associated symptoms include postnasal drip and rhinorrhea. Pertinent negatives include no fever, sore throat, shortness of breath or wheezing.   Fatigue  Associated symptoms include coughing and fatigue. Pertinent negatives include no congestion, fever or sore throat.       Patient presents today with concern for URI symptoms and presyncope this morning. She was making pancakes this morning and felt weak as if she was going to pass out. She sat in a chair and put her head between her legs and then sat for about 10 minutes and those symptoms passed. She checked her BP which was 80s/50s. She had coffee and water that morning.   She is on amlodipine 5mg daily which she had already taken.   She does not monitor  "her blood pressure daily at home  She states she has a hx of fainting.     She started with cold symptoms 3 days ago. Symptoms include sneezing, rhinorrhea, cough. Cough is productive of mucous.. he endorses some wheezing but denies any chest tightness or SOB    Review of Systems   Constitutional:  Positive for fatigue. Negative for fever.   HENT:  Positive for postnasal drip, rhinorrhea and sneezing. Negative for congestion and sore throat.    Respiratory:  Positive for cough. Negative for chest tightness, shortness of breath and wheezing.    Neurological:  Negative for syncope (presyncope).       Objective   /58 (BP Location: Left arm, Patient Position: Sitting, Cuff Size: Adult)   Pulse 71   Temp 98.6 °F (37 °C) (Temporal)   Resp 20   Ht 5' 3\" (1.6 m)   Wt 56.2 kg (124 lb)   SpO2 99%   BMI 21.97 kg/m²      Physical Exam  Vitals reviewed.   Constitutional:       General: She is not in acute distress.     Appearance: Normal appearance. She is normal weight. She is not diaphoretic.   HENT:      Head: Normocephalic and atraumatic.      Right Ear: There is impacted cerumen.      Left Ear: Tympanic membrane and external ear normal.      Nose: Rhinorrhea present.      Mouth/Throat:      Mouth: Mucous membranes are moist.      Pharynx: Oropharynx is clear. No oropharyngeal exudate or posterior oropharyngeal erythema.   Eyes:      Extraocular Movements: Extraocular movements intact.      Conjunctiva/sclera: Conjunctivae normal.      Pupils: Pupils are equal, round, and reactive to light.   Cardiovascular:      Rate and Rhythm: Normal rate and regular rhythm.   Pulmonary:      Effort: Pulmonary effort is normal. No respiratory distress.      Breath sounds: Wheezing (diffuse wheezing throughout) present. No rhonchi or rales.      Comments: Productive cough  Skin:     General: Skin is warm.   Neurological:      Mental Status: She is alert and oriented to person, place, and time. Mental status is at baseline. "   Psychiatric:         Mood and Affect: Mood normal.         Behavior: Behavior normal.

## 2025-04-16 NOTE — ASSESSMENT & PLAN NOTE
BP beyond goal with episodes of hypotension and near syncope  Decrease amlodipine to 2.5mg daily  Monitor blood pressure daily at home  Follow up with PCP as scheduled in 3 weeks  Orders:    amLODIPine (NORVASC) 2.5 mg tablet; Take 1 tablet (2.5 mg total) by mouth daily

## 2025-04-24 ENCOUNTER — OFFICE VISIT (OUTPATIENT)
Dept: INTERNAL MEDICINE CLINIC | Facility: OTHER | Age: 89
End: 2025-04-24
Payer: COMMERCIAL

## 2025-04-24 VITALS
HEIGHT: 63 IN | BODY MASS INDEX: 21.62 KG/M2 | DIASTOLIC BLOOD PRESSURE: 66 MMHG | WEIGHT: 122 LBS | OXYGEN SATURATION: 99 % | TEMPERATURE: 98.8 F | HEART RATE: 77 BPM | SYSTOLIC BLOOD PRESSURE: 124 MMHG

## 2025-04-24 DIAGNOSIS — H93.8X2 BLOCKED EAR, LEFT: ICD-10-CM

## 2025-04-24 DIAGNOSIS — J40 BRONCHITIS: Primary | ICD-10-CM

## 2025-04-24 PROCEDURE — G2211 COMPLEX E/M VISIT ADD ON: HCPCS | Performed by: NURSE PRACTITIONER

## 2025-04-24 PROCEDURE — 99213 OFFICE O/P EST LOW 20 MIN: CPT | Performed by: NURSE PRACTITIONER

## 2025-04-24 RX ORDER — BENZONATATE 100 MG/1
100 CAPSULE ORAL 3 TIMES DAILY PRN
Qty: 20 CAPSULE | Refills: 0 | Status: SHIPPED | OUTPATIENT
Start: 2025-04-24

## 2025-04-24 RX ORDER — AZELASTINE 1 MG/ML
1 SPRAY, METERED NASAL 2 TIMES DAILY
Qty: 30 ML | Refills: 0 | Status: SHIPPED | OUTPATIENT
Start: 2025-04-24

## 2025-04-24 RX ORDER — CEFDINIR 300 MG/1
300 CAPSULE ORAL EVERY 12 HOURS SCHEDULED
Qty: 14 CAPSULE | Refills: 0 | Status: SHIPPED | OUTPATIENT
Start: 2025-04-24 | End: 2025-05-01

## 2025-04-24 NOTE — PROGRESS NOTES
Name: Fallon Proctor      : 1935      MRN: [id number]  Encounter Provider: SOPHIE Hernandez  Encounter Date: 2025   Encounter department: [facility]  :  Assessment & Plan  Bronchitis  Start cefdinir twice daily for 7 days   Start tessalon perles every 8 hours for cough  Mucinex 1200mg twice daily (over the counter)  Restart azelastine nasal spray, 1 spray in each nostril twice daily   Follow up if symptoms worsen or do not improve      Orders:    cefdinir (OMNICEF) 300 mg capsule; Take 1 capsule (300 mg total) by mouth every 12 (twelve) hours for 7 days    benzonatate (TESSALON PERLES) 100 mg capsule; Take 1 capsule (100 mg total) by mouth 3 (three) times a day as needed for cough    Blocked ear, left    Orders:    azelastine (ASTELIN) 0.1 % nasal spray; 1 spray into each nostril 2 (two) times a day Use in each nostril as directed           History of Present Illness   Cough  Associated symptoms include postnasal drip, a sore throat (scratchy) and shortness of breath (with activity). Pertinent negatives include no fever, rhinorrhea or wheezing (resolved).       Patient presents today for 1 week follow up bronchitis and hypotension. Her blood pressure is more stable today. She denies any episodes of pre syncope or dizziness at home since adjusting her medications and reducing her amlodipine.     She continues with her cough but her wheezing is resolved. She feels her cough is unchanged. She continues with a productive cough, especially during the night. She is also having throat irritation.   She completed her prednisone, but never started the azelastine nasal spray or the mucinex.   She states she never picked up her tessalon perles      Review of Systems   Constitutional:  Negative for fever.   HENT:  Positive for postnasal drip and sore throat (scratchy). Negative for congestion, rhinorrhea, sinus pressure, sinus pain and sneezing.    Respiratory:   "Positive for cough and shortness of breath (with activity). Negative for chest tightness and wheezing (resolved).        Objective   /66 (BP Location: Left arm, Patient Position: Sitting, Cuff Size: Adult)   Pulse 77   Temp 98.8 °F (37.1 °C) (Temporal)   Ht 5' 3\" (1.6 m)   Wt 55.3 kg (122 lb)   SpO2 99%   BMI 21.61 kg/m²      Physical Exam  Vitals reviewed.   Constitutional:       General: She is not in acute distress.     Appearance: Normal appearance. She is normal weight. She is not diaphoretic.   HENT:      Head: Normocephalic and atraumatic.      Right Ear: Tympanic membrane and external ear normal.      Left Ear: Tympanic membrane and external ear normal.      Nose: Rhinorrhea present.      Mouth/Throat:      Mouth: Mucous membranes are moist.      Pharynx: Oropharynx is clear. No oropharyngeal exudate or posterior oropharyngeal erythema.   Eyes:      Extraocular Movements: Extraocular movements intact.      Conjunctiva/sclera: Conjunctivae normal.      Pupils: Pupils are equal, round, and reactive to light.   Cardiovascular:      Rate and Rhythm: Normal rate and regular rhythm.      Heart sounds: Normal heart sounds.   Pulmonary:      Effort: Pulmonary effort is normal. No respiratory distress.      Breath sounds: Rhonchi (left middle and lower lobe, right lower middle lobe) present. No wheezing or rales.   Neurological:      Mental Status: She is alert and oriented to person, place, and time. Mental status is at baseline.   Psychiatric:         Mood and Affect: Mood normal.         Behavior: Behavior normal.         "

## 2025-04-24 NOTE — PATIENT INSTRUCTIONS
Start cefdinir twice daily for 7 days   Start tessalon perles every 8 hours for cough  Mucinex 1200mg twice daily (over the counter)  Restart azelastine nasal spray, 1 spray in each nostril twice daily   Follow up if symptoms worsen or do not improve

## 2025-04-30 ENCOUNTER — TELEPHONE (OUTPATIENT)
Age: 89
End: 2025-04-30

## 2025-04-30 NOTE — TELEPHONE ENCOUNTER
Received call from Patient asking about her appt. Patient verbalized she was left a VM from [facility], but she could not understand the message. No encounter left, may have been a [employer] call.    Informed Patient her appt is still on 5/7/2025 11:20 am Lia Burgos. Verified insurance, provided Loretta addr.     Patient verbalized understanding.

## 2025-05-06 ENCOUNTER — OFFICE VISIT (OUTPATIENT)
Dept: INTERNAL MEDICINE CLINIC | Facility: OTHER | Age: 89
End: 2025-05-06
Payer: COMMERCIAL

## 2025-05-06 ENCOUNTER — TELEPHONE (OUTPATIENT)
Age: 89
End: 2025-05-06

## 2025-05-06 VITALS
HEART RATE: 73 BPM | DIASTOLIC BLOOD PRESSURE: 62 MMHG | BODY MASS INDEX: 21.62 KG/M2 | WEIGHT: 122 LBS | HEIGHT: 63 IN | TEMPERATURE: 97.5 F | OXYGEN SATURATION: 98 % | SYSTOLIC BLOOD PRESSURE: 128 MMHG

## 2025-05-06 DIAGNOSIS — I10 ESSENTIAL HYPERTENSION: Primary | ICD-10-CM

## 2025-05-06 DIAGNOSIS — E78.2 MIXED HYPERLIPIDEMIA: ICD-10-CM

## 2025-05-06 DIAGNOSIS — N18.31 STAGE 3A CHRONIC KIDNEY DISEASE (HCC): ICD-10-CM

## 2025-05-06 DIAGNOSIS — K21.9 GASTROESOPHAGEAL REFLUX DISEASE WITHOUT ESOPHAGITIS: ICD-10-CM

## 2025-05-06 DIAGNOSIS — M81.0 AGE-RELATED OSTEOPOROSIS WITHOUT CURRENT PATHOLOGICAL FRACTURE: ICD-10-CM

## 2025-05-06 DIAGNOSIS — F41.9 ANXIETY: ICD-10-CM

## 2025-05-06 PROCEDURE — 96372 THER/PROPH/DIAG INJ SC/IM: CPT | Performed by: INTERNAL MEDICINE

## 2025-05-06 PROCEDURE — 99214 OFFICE O/P EST MOD 30 MIN: CPT | Performed by: INTERNAL MEDICINE

## 2025-05-06 PROCEDURE — G2211 COMPLEX E/M VISIT ADD ON: HCPCS | Performed by: INTERNAL MEDICINE

## 2025-05-06 RX ORDER — OMEPRAZOLE 20 MG/1
20 CAPSULE, DELAYED RELEASE ORAL DAILY
Qty: 90 CAPSULE | Refills: 1 | Status: SHIPPED | OUTPATIENT
Start: 2025-05-06

## 2025-05-06 RX ORDER — ATORVASTATIN CALCIUM 20 MG/1
20 TABLET, FILM COATED ORAL DAILY
Qty: 90 TABLET | Refills: 1 | Status: SHIPPED | OUTPATIENT
Start: 2025-05-06

## 2025-05-06 NOTE — TELEPHONE ENCOUNTER
LVM for pt to confirm appt for 05/07 with Lia in our [place] office. Advised pt to callback to r/s or cancel this appt if needed.

## 2025-05-06 NOTE — PROGRESS NOTES
Name: Fallon Proctor      : 1935      MRN: [id number]  Encounter Provider: Wali Byrd MD  Encounter Date: 2025   Encounter department: [facility]  :  Assessment & Plan  Gastroesophageal reflux disease without esophagitis  Well on present dose of omeprazole.  Will continue to monitor  Orders:    omeprazole (PriLOSEC) 20 mg delayed release capsule; Take 1 capsule (20 mg total) by mouth daily    Mixed hyperlipidemia  Continue with atorvastatin 20 mg daily along with low-fat diet.  She is due for lipid profile  Orders:    atorvastatin (LIPITOR) 20 mg tablet; Take 1 tablet (20 mg total) by mouth daily    Anxiety  Doing well on present regimen of sertraline and Xanax as needed  Orders:    sertraline (ZOLOFT) 50 mg tablet; Take 1 tablet (50 mg total) by mouth daily    Essential hypertension  Blood pressure is stable on present regimen       Age-related osteoporosis without current pathological fracture  Injection Prolia given right arm tolerated well.  Continue with calcium, vitamin D3 and weightbearing exercise       Stage 3a chronic kidney disease (HCC)  Lab Results   Component Value Date    EGFR 70 10/22/2024    EGFR 75 2024    EGFR 81 2023    CREATININE 0.76 10/22/2024    CREATININE 0.76 2024    CREATININE 0.72 2023     Continue with adequate oral hydration and to avoid nephrotoxic med including OTC NSAIDs.  She is due for renal function              History of Present Illness   Patient is here for follow-up and Prolia injection.  Otherwise no new complaints or concern      Review of Systems   Constitutional:  Negative for fatigue and fever.   HENT:  Negative for congestion, ear discharge, ear pain, postnasal drip, sinus pressure, sore throat, tinnitus and trouble swallowing.    Eyes:  Negative for discharge, itching and visual disturbance.   Respiratory:  Negative for cough and shortness of breath.    Cardiovascular:  Negative for  "chest pain and palpitations.   Gastrointestinal:  Negative for abdominal pain, diarrhea, nausea and vomiting.   Endocrine: Negative for cold intolerance and polyuria.   Genitourinary:  Negative for difficulty urinating, dysuria and urgency.   Musculoskeletal:  Positive for arthralgias. Negative for neck pain.   Skin:  Negative for rash.   Allergic/Immunologic: Negative for environmental allergies.   Neurological:  Negative for dizziness, weakness and headaches.   Psychiatric/Behavioral:  Negative for agitation and behavioral problems. The patient is not nervous/anxious.        Objective   /62 (BP Location: Left arm, Patient Position: Sitting, Cuff Size: Adult)   Pulse 73   Temp 97.5 °F (36.4 °C) (Temporal)   Ht 5' 3\" (1.6 m)   Wt 55.3 kg (122 lb)   SpO2 98%   BMI 21.61 kg/m²      Physical Exam  Constitutional:       General: She is not in acute distress.     Appearance: She is well-developed. She is not ill-appearing.   HENT:      Head: Normocephalic.      Right Ear: External ear normal. There is no impacted cerumen.      Left Ear: External ear normal. There is no impacted cerumen.      Nose: No congestion or rhinorrhea.      Mouth/Throat:      Pharynx: No oropharyngeal exudate or posterior oropharyngeal erythema.   Eyes:      General: No scleral icterus.     Pupils: Pupils are equal, round, and reactive to light.   Neck:      Thyroid: No thyromegaly.      Trachea: No tracheal deviation.   Cardiovascular:      Rate and Rhythm: Normal rate and regular rhythm.      Heart sounds: Normal heart sounds. No murmur heard.  Pulmonary:      Effort: Pulmonary effort is normal. No respiratory distress.      Breath sounds: Normal breath sounds.   Chest:      Chest wall: No tenderness.   Abdominal:      General: Bowel sounds are normal.      Palpations: Abdomen is soft. There is no mass.      Tenderness: There is no abdominal tenderness.   Musculoskeletal:         General: Normal range of motion.      Cervical back: " Normal range of motion and neck supple. No rigidity.      Right lower leg: No edema.      Left lower leg: No edema.   Lymphadenopathy:      Cervical: No cervical adenopathy.   Skin:     General: Skin is warm.      Findings: No erythema or rash.   Neurological:      Mental Status: She is alert and oriented to person, place, and time.      Cranial Nerves: No cranial nerve deficit.   Psychiatric:         Mood and Affect: Mood normal.         Behavior: Behavior normal.

## 2025-05-06 NOTE — ASSESSMENT & PLAN NOTE
Blood pressure is stable on present regimen       
Continue with atorvastatin 20 mg daily along with low-fat diet.  She is due for lipid profile  Orders:    atorvastatin (LIPITOR) 20 mg tablet; Take 1 tablet (20 mg total) by mouth daily    
Doing well on present regimen of sertraline and Xanax as needed  Orders:    sertraline (ZOLOFT) 50 mg tablet; Take 1 tablet (50 mg total) by mouth daily    
Injection Prolia given right arm tolerated well.  Continue with calcium, vitamin D3 and weightbearing exercise       
Lab Results   Component Value Date    EGFR 70 10/22/2024    EGFR 75 03/22/2024    EGFR 81 12/04/2023    CREATININE 0.76 10/22/2024    CREATININE 0.76 03/22/2024    CREATININE 0.72 12/04/2023     Continue with adequate oral hydration and to avoid nephrotoxic med including OTC NSAIDs.  She is due for renal function       
Well on present dose of omeprazole.  Will continue to monitor  Orders:    omeprazole (PriLOSEC) 20 mg delayed release capsule; Take 1 capsule (20 mg total) by mouth daily    
Pre-Hospital Care Report (PCR)

## 2025-05-07 ENCOUNTER — OFFICE VISIT (OUTPATIENT)
Age: 89
End: 2025-05-07
Payer: COMMERCIAL

## 2025-05-07 VITALS — TEMPERATURE: 97.8 F | WEIGHT: 123 LBS | BODY MASS INDEX: 21.79 KG/M2

## 2025-05-07 DIAGNOSIS — L57.0 KERATOSIS, ACTINIC: Primary | ICD-10-CM

## 2025-05-07 PROCEDURE — 99204 OFFICE O/P NEW MOD 45 MIN: CPT

## 2025-05-07 PROCEDURE — 17000 DESTRUCT PREMALG LESION: CPT

## 2025-05-07 NOTE — PROGRESS NOTES
"[facility] Dermatology Clinic Note     Patient Name: Fallon Proctor  Encounter Date: 5/7/2025       Have you been cared for by a [facility] Dermatologist in the last 3 years and, if so, which description applies to you? NO. I am considered a \"new\" patient and must complete all patient intake questions. I am of child-bearing potential.     REVIEW OF SYSTEMS:  Have you recently had or currently have any of the following? Recent fever or chills? No  Any non-healing wound? No  Are you pregnant or planning to become pregnant? No  Are you currently or planning to be nursing or breast feeding? No   PAST MEDICAL HISTORY:  Have you personally ever had or currently have any of the following?  If \"YES,\" then please provide more detail. Skin cancer (such as Melanoma, Basal Cell Carcinoma, Squamous Cell Carcinoma?  YES, Squamous cell carcinoma   Tuberculosis, HIV/AIDS, Hepatitis B or C: No  Radiation Treatment No   HISTORY OF IMMUNOSUPPRESSION:   Do you have a history of any of the following:  Systemic Immunosuppression such as Diabetes, Biologic or Immunotherapy, Chemotherapy, Organ Transplantation, Bone Marrow Transplantation or Prednsione?  No    Answering \"YES\" requires the addition of the dotphrase \"IMMUNOSUPPRESSED\" as the first diagnosis of the patient's visit.   FAMILY HISTORY:  Any \"first degree relatives\" (parent, brother, sister, or child) with the following?    Skin Cancer, Pancreatic or Other Cancer? No   PATIENT EXPERIENCE:    Do you want the Dermatologist to perform a COMPLETE skin exam today including a clinical examination under the \"bra and underwear\" areas?  NO  If necessary, do we have your permission to call and leave a detailed message on your Preferred Phone number that includes your specific medical information?  Yes      Allergies   Allergen Reactions    Flexeril [Cyclobenzaprine] Syncope    Daypro [Oxaprozin]      Unknown allergic reaction    Minocycline Other (See Comments)     Loss of taste    "   Current Outpatient Medications:     acetaminophen (TYLENOL) 500 mg tablet, Take 1 tablet (500 mg total) by mouth every 4 (four) hours as needed (knee and back pain), Disp: 90 tablet, Rfl: 1    amLODIPine (NORVASC) 2.5 mg tablet, Take 1 tablet (2.5 mg total) by mouth daily, Disp: 30 tablet, Rfl: 1    atorvastatin (LIPITOR) 20 mg tablet, Take 1 tablet (20 mg total) by mouth daily, Disp: 90 tablet, Rfl: 1    azelastine (ASTELIN) 0.1 % nasal spray, 1 spray into each nostril 2 (two) times a day Use in each nostril as directed, Disp: 30 mL, Rfl: 0    Cholecalciferol (Vitamin D3) 1000 units CAPS, Take 1 capsule by mouth if needed, Disp: , Rfl:     Cranberry-Vitamin C-Probiotic (AZO CRANBERRY PO), Take by mouth as needed, Disp: , Rfl:     LORazepam (ATIVAN) 0.5 mg tablet, Take 1 tablet (0.5 mg total) by mouth daily at bedtime as needed (sleep), Disp: 30 tablet, Rfl: 0    omeprazole (PriLOSEC) 20 mg delayed release capsule, Take 1 capsule (20 mg total) by mouth daily, Disp: 90 capsule, Rfl: 1    sertraline (ZOLOFT) 50 mg tablet, Take 1 tablet (50 mg total) by mouth daily, Disp: 90 tablet, Rfl: 1    Current Facility-Administered Medications:     denosumab (PROLIA) subcutaneous injection 60 mg, 60 mg, Subcutaneous, Q6 Months, Wali Byrd MD, 60 mg at 05/06/25 1612              Whom besides the patient is providing clinical information about today's encounter?   NO ADDITIONAL HISTORIAN (patient alone provided history)    Physical Exam and Assessment/Plan by Diagnosis:    ACTINIC KERATOSIS    Physical Exam:  Anatomic Location Affected:  Left cheek  Morphological Description:  scaling pink macule     Additional History of Present Condition:  Patient was evaluated at [facility] on 4/10/2025.  Actinic keratosis lesion found on left cheek. Patient presents for treatment.     Assessment and Plan:  Based on a thorough discussion of this condition and the management approach to it (including a comprehensive discussion of the  "known risks, side effects and potential benefits of treatment), the patient (family) agrees to implement the following specific plan:    Cryotherapy performed in office today, consent obtained. See procedure below.     PROCEDURE:  DESTRUCTION OF PRE-MALIGNANT LESIONS  After a thorough discussion of treatment options and risk/benefits/alternatives (including but not limited to local pain, scarring, dyspigmentation, blistering, and possible superinfection), verbal and written consent were obtained and the aforementioned lesions were treated on with cryotherapy using liquid nitrogen x 1 cycle for 5-10 seconds.    TOTAL NUMBER of 1 pre-malignant lesions were treated today on the ANATOMIC LOCATION: left cheek.     The patient tolerated the procedure well, and after-care instructions were provided.    SEBORRHEIC KERATOSIS    Physical Exam:  Anatomic Location: face, chest   Morphologic Description: Flat and raised, waxy, smooth to warty textured, yellow to brownish-grey to dark brown to blackish, discrete, \"stuck-on\" appearing papules.    Additional History of Present Condition:   Patient would like to know what these are.     Plan:  Reassured benign.         Scribe Attestation      I,:  Veronica Desai MA am acting as a scribe while in the presence of the attending physician.:       I,:  Lia Baxter PA-C personally performed the services described in this documentation    as scribed in my presence.:             "

## 2025-05-08 ENCOUNTER — APPOINTMENT (OUTPATIENT)
Dept: LAB | Facility: IMAGING CENTER | Age: 89
End: 2025-05-08
Payer: COMMERCIAL

## 2025-05-08 DIAGNOSIS — K21.9 GASTROESOPHAGEAL REFLUX DISEASE WITHOUT ESOPHAGITIS: ICD-10-CM

## 2025-05-08 DIAGNOSIS — E78.2 MIXED HYPERLIPIDEMIA: ICD-10-CM

## 2025-05-08 DIAGNOSIS — R73.03 PREDIABETES: ICD-10-CM

## 2025-05-08 DIAGNOSIS — N18.31 STAGE 3A CHRONIC KIDNEY DISEASE (HCC): ICD-10-CM

## 2025-05-08 LAB
ALBUMIN SERPL BCG-MCNC: 3.8 G/DL (ref 3.5–5)
ALP SERPL-CCNC: 51 U/L (ref 34–104)
ALT SERPL W P-5'-P-CCNC: 7 U/L (ref 7–52)
ANION GAP SERPL CALCULATED.3IONS-SCNC: 5 MMOL/L (ref 4–13)
AST SERPL W P-5'-P-CCNC: 12 U/L (ref 13–39)
BILIRUB SERPL-MCNC: 0.42 MG/DL (ref 0.2–1)
BUN SERPL-MCNC: 13 MG/DL (ref 5–25)
CALCIUM SERPL-MCNC: 9.5 MG/DL (ref 8.4–10.2)
CHLORIDE SERPL-SCNC: 104 MMOL/L (ref 96–108)
CHOLEST SERPL-MCNC: 139 MG/DL (ref ?–200)
CO2 SERPL-SCNC: 28 MMOL/L (ref 21–32)
CREAT SERPL-MCNC: 0.71 MG/DL (ref 0.6–1.3)
ERYTHROCYTE [DISTWIDTH] IN BLOOD BY AUTOMATED COUNT: 13.3 % (ref 11.6–15.1)
EST. AVERAGE GLUCOSE BLD GHB EST-MCNC: 131 MG/DL
GFR SERPL CREATININE-BSD FRML MDRD: 76 ML/MIN/1.73SQ M
GLUCOSE P FAST SERPL-MCNC: 99 MG/DL (ref 65–99)
HBA1C MFR BLD: 6.2 %
HCT VFR BLD AUTO: 41.2 % (ref 34.8–46.1)
HDLC SERPL-MCNC: 47 MG/DL
HGB BLD-MCNC: 12.6 G/DL (ref 11.5–15.4)
LDLC SERPL CALC-MCNC: 74 MG/DL (ref 0–100)
MCH RBC QN AUTO: 27.3 PG (ref 26.8–34.3)
MCHC RBC AUTO-ENTMCNC: 30.6 G/DL (ref 31.4–37.4)
MCV RBC AUTO: 89 FL (ref 82–98)
PLATELET # BLD AUTO: 240 THOUSANDS/UL (ref 149–390)
PMV BLD AUTO: 10.8 FL (ref 8.9–12.7)
POTASSIUM SERPL-SCNC: 4 MMOL/L (ref 3.5–5.3)
PROT SERPL-MCNC: 6.3 G/DL (ref 6.4–8.4)
RBC # BLD AUTO: 4.62 MILLION/UL (ref 3.81–5.12)
SODIUM SERPL-SCNC: 137 MMOL/L (ref 135–147)
TRIGL SERPL-MCNC: 92 MG/DL (ref ?–150)
WBC # BLD AUTO: 7.17 THOUSAND/UL (ref 4.31–10.16)

## 2025-05-08 PROCEDURE — 85027 COMPLETE CBC AUTOMATED: CPT

## 2025-05-08 PROCEDURE — 80053 COMPREHEN METABOLIC PANEL: CPT

## 2025-05-08 PROCEDURE — 80061 LIPID PANEL: CPT

## 2025-05-08 PROCEDURE — 36415 COLL VENOUS BLD VENIPUNCTURE: CPT

## 2025-05-08 PROCEDURE — 83036 HEMOGLOBIN GLYCOSYLATED A1C: CPT

## 2025-05-12 ENCOUNTER — TELEPHONE (OUTPATIENT)
Age: 89
End: 2025-05-12

## 2025-05-12 ENCOUNTER — TELEPHONE (OUTPATIENT)
Dept: DERMATOLOGY | Facility: CLINIC | Age: 89
End: 2025-05-12

## 2025-05-12 NOTE — TELEPHONE ENCOUNTER
Patient called requesting to speak with the manager regarding her experience during her last appointment on 05/07.   Please reach ou to [phone]   Email sent to Rhonda.

## 2025-05-16 ENCOUNTER — NURSE TRIAGE (OUTPATIENT)
Age: 89
End: 2025-05-16

## 2025-05-16 NOTE — TELEPHONE ENCOUNTER
Regarding: possible damage to ear drum/inner, severe pain, decreased hearing  ----- Message from Malinda PALUMBO sent at 5/16/2025  2:27 PM EDT -----  Possible damage to left ear drum/inner ear. H/o blocked left ear, pt was gifted hearing aides that amplify sound from [employer], used today severe pain left ear started today with worsened hearing

## 2025-05-16 NOTE — TELEPHONE ENCOUNTER
"  FOLLOW UP: not at this time    REASON FOR CONVERSATION: Earache    SYMPTOMS: mild ear pain    OTHER: used [employer] bought amplifier which caused pain, she removed it    DISPOSITION: Home Care      Patient has upcoming hearing test with [facility]. Advised her not to put anything in her ear. She agreed.              Reason for Disposition   Earache < 60 minutes duration that is now completely gone    Answer Assessment - Initial Assessment Questions  1. LOCATION: \"Which ear is involved?\"      Left ear  2. ONSET: \"When did the ear pain start?\"       today  3. SEVERITY: \"How bad is the pain?\"  (Scale 1-10; mild, moderate or severe)      mild  4. URI SYMPTOMS: \"Do you have a runny nose or cough?\"      no  5. FEVER: \"Do you have a fever?\" If Yes, ask: \"What is your temperature, how was it measured, and when did it start?\"      no  6. CAUSE: \"Have you been swimming recently?\", \"How often do you use Q-TIPS?\", \"Have you had any recent air travel or scuba diving?\"      Was using new ear piece to amplify sound  7. OTHER SYMPTOMS: \"Do you have any other symptoms?\" (e.g., decreased hearing, dizziness, headache, stiff neck, vomiting)      none    Protocols used: Earache-Adult-[place]    "

## 2025-05-19 NOTE — TELEPHONE ENCOUNTER
Patient will be having hearing test next week. Prefers to hold off on making appointment until she get her hearing test.

## 2025-06-04 DIAGNOSIS — I10 ESSENTIAL HYPERTENSION: ICD-10-CM

## 2025-06-05 RX ORDER — AMLODIPINE BESYLATE 2.5 MG/1
2.5 TABLET ORAL DAILY
Qty: 90 TABLET | Refills: 1 | Status: SHIPPED | OUTPATIENT
Start: 2025-06-05

## 2025-06-27 ENCOUNTER — TELEPHONE (OUTPATIENT)
Dept: FAMILY MEDICINE CLINIC | Facility: CLINIC | Age: 89
End: 2025-06-27

## 2025-06-27 NOTE — TELEPHONE ENCOUNTER
Patient called nurse line regarding our location. RN call to patient. Patient is not a patient in our office but wanted to inquire about the free lunches on Tuesday, Wednesday, Thursdays. RN gave patient location.

## 2025-07-10 ENCOUNTER — RA CDI HCC (OUTPATIENT)
Dept: OTHER | Facility: HOSPITAL | Age: 89
End: 2025-07-10

## 2025-07-21 ENCOUNTER — NURSE TRIAGE (OUTPATIENT)
Age: 89
End: 2025-07-21

## 2025-07-21 ENCOUNTER — OFFICE VISIT (OUTPATIENT)
Dept: INTERNAL MEDICINE CLINIC | Facility: OTHER | Age: 89
End: 2025-07-21
Payer: COMMERCIAL

## 2025-07-21 ENCOUNTER — HOSPITAL ENCOUNTER (OUTPATIENT)
Dept: RADIOLOGY | Facility: IMAGING CENTER | Age: 89
Discharge: HOME | End: 2025-07-21
Payer: COMMERCIAL

## 2025-07-21 VITALS
DIASTOLIC BLOOD PRESSURE: 64 MMHG | SYSTOLIC BLOOD PRESSURE: 118 MMHG | TEMPERATURE: 98 F | BODY MASS INDEX: 21.65 KG/M2 | HEART RATE: 62 BPM | OXYGEN SATURATION: 97 % | WEIGHT: 122.2 LBS | HEIGHT: 63 IN | RESPIRATION RATE: 18 BRPM

## 2025-07-21 DIAGNOSIS — R09.89 RHONCHI AT LEFT LUNG BASE: ICD-10-CM

## 2025-07-21 DIAGNOSIS — J06.9 URTI (ACUTE UPPER RESPIRATORY INFECTION): ICD-10-CM

## 2025-07-21 DIAGNOSIS — J06.9 URTI (ACUTE UPPER RESPIRATORY INFECTION): Primary | ICD-10-CM

## 2025-07-21 PROCEDURE — 71046 X-RAY EXAM CHEST 2 VIEWS: CPT

## 2025-07-21 PROCEDURE — G2211 COMPLEX E/M VISIT ADD ON: HCPCS | Performed by: INTERNAL MEDICINE

## 2025-07-21 PROCEDURE — 99213 OFFICE O/P EST LOW 20 MIN: CPT | Performed by: INTERNAL MEDICINE

## 2025-07-21 NOTE — TELEPHONE ENCOUNTER
"REASON FOR CONVERSATION: Cough    SYMPTOMS: Congestion and wheezing for 5 days. Production of large amount white mucous. Feels congestion is going into lungs. Want to be seen.    OTHER HEALTH INFORMATION: no    PROTOCOL DISPOSITION: See Today or Tomorrow in Office    CARE ADVICE PROVIDED: CALL BACK IF: * Difficulty breathing occurs * Fever lasts more than 3 days * Nasal discharge lasts more than 10 days * Cough lasts more than 3 weeks * You become wors     PRACTICE FOLLOW-UP: none      Reason for Disposition   Patient wants to be seen    Answer Assessment - Initial Assessment Questions  1. ONSET: \"When did the cough begin?\"       5-6 days   2. SEVERITY: \"How bad is the cough today?\"       moderated  3. SPUTUM: \"Describe the color of your sputum\" (e.g., none, dry cough; clear, white, yellow, green)      Yes-white   4. HEMOPTYSIS: \"Are you coughing up any blood?\" If Yes, ask: \"How much?\" (e.g., flecks, streaks, tablespoons, etc.)      no  5. DIFFICULTY BREATHING: \"Are you having difficulty breathing?\" If Yes, ask: \"How bad is it?\" (e.g., mild, moderate, severe)       no  6. FEVER: \"Do you have a fever?\" If Yes, ask: \"What is your temperature, how was it measured, and when did it start?\"      unsure  7. CARDIAC HISTORY: \"Do you have any history of heart disease?\" (e.g., heart attack, congestive heart failure)       no  8. LUNG HISTORY: \"Do you have any history of lung disease?\"  (e.g., pulmonary embolus, asthma, emphysema)      no  9. PE RISK FACTORS: \"Do you have a history of blood clots?\" (or: recent major surgery, recent prolonged travel, bedridden)      no  10. OTHER SYMPTOMS: \"Do you have any other symptoms?\" (e.g., runny nose, wheezing, chest pain)        Congestion, mucous, doesn't feel well, chest hurts when coughing  11. PREGNANCY: \"Is there any chance you are pregnant?\" \"When was your last menstrual period?\"        no  12. TRAVEL: \"Have you traveled out of the country in the last month?\" (e.g., travel " history, exposures)        no    Protocols used: Cough-Adult-[place]

## 2025-07-21 NOTE — PROGRESS NOTES
Name: Fallon Proctor      : 1935      MRN: [id number]  Encounter Provider: Garry Salazar MD  Encounter Date: 2025   Encounter department: [facility]  :  Assessment & Plan  Rhonchi at left lung base    Orders:  •  amoxicillin-clavulanate (AUGMENTIN) 875-125 mg per tablet; Take 1 tablet by mouth every 12 (twelve) hours for 7 days  •  XR chest pa and lateral; Future    URTI (acute upper respiratory infection)  Will prescribe Augmentin twice a day for 7 days as well as order an x-ray for evaluation of left lung rhonchi appreciated on exam, rule out pneumonia.  Orders:  •  amoxicillin-clavulanate (AUGMENTIN) 875-125 mg per tablet; Take 1 tablet by mouth every 12 (twelve) hours for 7 days  •  XR chest pa and lateral; Future           History of Present Illness   Fallon Proctor is seen today with concern for URI symptoms of 4-5 days duration.  She has a mucous productive cough.  She denies any fever/chills.  She had a fall 2 weeks ago, landing on her back.  She denies any chest pain.  She denies any pain with coughing.    Cough  Pertinent negatives include no chest pain, chills, fever, headaches, postnasal drip, rhinorrhea, sore throat, shortness of breath or wheezing.   Fall  Pertinent negatives include no abdominal pain, fever, headaches, hematuria, nausea, numbness or vomiting.   URI   This is a new problem. The current episode started in the past 7 days. The problem has been unchanged. There has been no fever. Associated symptoms include congestion and coughing. Pertinent negatives include no abdominal pain, chest pain, diarrhea, dysuria, headaches, nausea, rhinorrhea, sinus pain, sneezing, sore throat, vomiting or wheezing. She has tried nothing for the symptoms.     Review of Systems   Constitutional:  Negative for activity change, appetite change, chills, diaphoresis, fatigue and fever.   HENT:  Positive for congestion. Negative for postnasal drip,  "rhinorrhea, sinus pressure, sinus pain, sneezing and sore throat.    Eyes:  Negative for visual disturbance.   Respiratory:  Positive for cough. Negative for apnea, choking, chest tightness, shortness of breath and wheezing.    Cardiovascular:  Negative for chest pain, palpitations and leg swelling.   Gastrointestinal:  Negative for abdominal distention, abdominal pain, anal bleeding, blood in stool, constipation, diarrhea, nausea and vomiting.   Endocrine: Negative for cold intolerance and heat intolerance.   Genitourinary:  Negative for difficulty urinating, dysuria and hematuria.   Musculoskeletal: Negative.    Skin: Negative.    Neurological:  Negative for dizziness, weakness, light-headedness, numbness and headaches.   Hematological:  Negative for adenopathy.   Psychiatric/Behavioral:  Negative for agitation, sleep disturbance and suicidal ideas.    All other systems reviewed and are negative.      Objective   /64 (BP Location: Left arm, Patient Position: Sitting, Cuff Size: Standard)   Pulse 62   Temp 98 °F (36.7 °C) (Temporal)   Resp 18   Ht 5' 3\" (1.6 m)   Wt 55.4 kg (122 lb 3.2 oz)   SpO2 97%   BMI 21.65 kg/m²      Physical Exam  Vitals and nursing note reviewed.   Constitutional:       General: She is not in acute distress.     Appearance: She is well-developed. She is not diaphoretic.   HENT:      Head: Normocephalic and atraumatic.     Eyes:      General:         Right eye: No discharge.         Left eye: No discharge.      Conjunctiva/sclera: Conjunctivae normal.      Pupils: Pupils are equal, round, and reactive to light.     Neck:      Thyroid: No thyromegaly.      Vascular: No JVD.     Cardiovascular:      Rate and Rhythm: Normal rate and regular rhythm.      Heart sounds: Normal heart sounds. No murmur heard.     No friction rub. No gallop.   Pulmonary:      Effort: Pulmonary effort is normal. No respiratory distress.      Breath sounds: Examination of the left-lower field reveals " rhonchi. Rhonchi present. No wheezing or rales.   Chest:      Chest wall: No tenderness.   Abdominal:      General: There is no distension.      Palpations: Abdomen is soft.      Tenderness: There is no abdominal tenderness.     Musculoskeletal:         General: No tenderness or deformity. Normal range of motion.      Cervical back: Normal range of motion and neck supple.   Lymphadenopathy:      Cervical: No cervical adenopathy.     Skin:     General: Skin is warm and dry.      Coloration: Skin is not pale.      Findings: No erythema or rash.     Neurological:      Mental Status: She is alert and oriented to person, place, and time.      Cranial Nerves: No cranial nerve deficit.      Coordination: Coordination normal.     Psychiatric:         Behavior: Behavior normal.         Thought Content: Thought content normal.         Judgment: Judgment normal.

## 2025-07-28 ENCOUNTER — OFFICE VISIT (OUTPATIENT)
Dept: INTERNAL MEDICINE CLINIC | Facility: OTHER | Age: 89
End: 2025-07-28
Payer: COMMERCIAL

## 2025-07-28 ENCOUNTER — TELEPHONE (OUTPATIENT)
Dept: INTERNAL MEDICINE CLINIC | Facility: OTHER | Age: 89
End: 2025-07-28

## 2025-07-28 VITALS
SYSTOLIC BLOOD PRESSURE: 112 MMHG | HEIGHT: 63 IN | OXYGEN SATURATION: 96 % | HEART RATE: 73 BPM | DIASTOLIC BLOOD PRESSURE: 58 MMHG | WEIGHT: 123 LBS | TEMPERATURE: 98.6 F | BODY MASS INDEX: 21.79 KG/M2

## 2025-07-28 DIAGNOSIS — K21.9 GASTROESOPHAGEAL REFLUX DISEASE WITHOUT ESOPHAGITIS: ICD-10-CM

## 2025-07-28 DIAGNOSIS — M81.0 AGE-RELATED OSTEOPOROSIS WITHOUT CURRENT PATHOLOGICAL FRACTURE: ICD-10-CM

## 2025-07-28 DIAGNOSIS — E78.2 MIXED HYPERLIPIDEMIA: ICD-10-CM

## 2025-07-28 DIAGNOSIS — Z00.00 MEDICARE ANNUAL WELLNESS VISIT, SUBSEQUENT: ICD-10-CM

## 2025-07-28 DIAGNOSIS — N18.31 STAGE 3A CHRONIC KIDNEY DISEASE (HCC): ICD-10-CM

## 2025-07-28 DIAGNOSIS — R73.03 PREDIABETES: ICD-10-CM

## 2025-07-28 DIAGNOSIS — I10 ESSENTIAL HYPERTENSION: Primary | ICD-10-CM

## 2025-07-28 PROCEDURE — 99214 OFFICE O/P EST MOD 30 MIN: CPT | Performed by: INTERNAL MEDICINE

## 2025-07-28 PROCEDURE — G0439 PPPS, SUBSEQ VISIT: HCPCS | Performed by: INTERNAL MEDICINE

## 2025-08-05 ENCOUNTER — TELEPHONE (OUTPATIENT)
Age: 89
End: 2025-08-05

## 2025-08-05 DIAGNOSIS — I10 ESSENTIAL HYPERTENSION: ICD-10-CM

## 2025-08-05 DIAGNOSIS — E78.2 MIXED HYPERLIPIDEMIA: ICD-10-CM

## 2025-08-05 DIAGNOSIS — K21.9 GASTROESOPHAGEAL REFLUX DISEASE WITHOUT ESOPHAGITIS: ICD-10-CM

## 2025-08-05 DIAGNOSIS — G47.00 INSOMNIA, UNSPECIFIED TYPE: ICD-10-CM

## 2025-08-05 DIAGNOSIS — F41.9 ANXIETY: ICD-10-CM

## 2025-08-05 RX ORDER — OMEPRAZOLE 20 MG/1
20 CAPSULE, DELAYED RELEASE ORAL DAILY
Qty: 90 CAPSULE | Refills: 1 | Status: SHIPPED | OUTPATIENT
Start: 2025-08-05

## 2025-08-05 RX ORDER — AMLODIPINE BESYLATE 2.5 MG/1
2.5 TABLET ORAL DAILY
Qty: 90 TABLET | Refills: 1 | Status: SHIPPED | OUTPATIENT
Start: 2025-08-05

## 2025-08-05 RX ORDER — ATORVASTATIN CALCIUM 20 MG/1
20 TABLET, FILM COATED ORAL DAILY
Qty: 90 TABLET | Refills: 1 | Status: SHIPPED | OUTPATIENT
Start: 2025-08-05

## 2025-08-05 RX ORDER — LORAZEPAM 0.5 MG/1
0.5 TABLET ORAL
Qty: 30 TABLET | Refills: 1 | Status: SHIPPED | OUTPATIENT
Start: 2025-08-05